# Patient Record
Sex: MALE | Race: WHITE | NOT HISPANIC OR LATINO | URBAN - METROPOLITAN AREA
[De-identification: names, ages, dates, MRNs, and addresses within clinical notes are randomized per-mention and may not be internally consistent; named-entity substitution may affect disease eponyms.]

---

## 2018-01-29 ENCOUNTER — INPATIENT (INPATIENT)
Facility: HOSPITAL | Age: 83
LOS: 10 days | Discharge: NURSING, REHAB OR LONG-TERM CARE | End: 2018-02-09
Attending: FAMILY MEDICINE | Admitting: FAMILY MEDICINE
Payer: MEDICARE

## 2018-01-29 VITALS
OXYGEN SATURATION: 84 % | RESPIRATION RATE: 16 BRPM | HEART RATE: 88 BPM | SYSTOLIC BLOOD PRESSURE: 108 MMHG | WEIGHT: 169.98 LBS | DIASTOLIC BLOOD PRESSURE: 54 MMHG | TEMPERATURE: 99 F | HEIGHT: 68 IN

## 2018-01-29 LAB
ADD ON TEST-SPECIMEN IN LAB: SIGNIFICANT CHANGE UP
ALBUMIN SERPL ELPH-MCNC: 3.1 G/DL — LOW (ref 3.3–5)
ALP SERPL-CCNC: 98 U/L — SIGNIFICANT CHANGE UP (ref 40–120)
ALT FLD-CCNC: 37 U/L — SIGNIFICANT CHANGE UP (ref 12–78)
ANION GAP SERPL CALC-SCNC: 7 MMOL/L — SIGNIFICANT CHANGE UP (ref 5–17)
ANION GAP SERPL CALC-SCNC: 8 MMOL/L — SIGNIFICANT CHANGE UP (ref 5–17)
ANISOCYTOSIS BLD QL: SLIGHT — SIGNIFICANT CHANGE UP
APTT BLD: 27.9 SEC — SIGNIFICANT CHANGE UP (ref 27.5–37.4)
AST SERPL-CCNC: 55 U/L — HIGH (ref 15–37)
BASOPHILS # BLD AUTO: 0 K/UL — SIGNIFICANT CHANGE UP (ref 0–0.2)
BILIRUB SERPL-MCNC: 0.6 MG/DL — SIGNIFICANT CHANGE UP (ref 0.2–1.2)
BUN SERPL-MCNC: 51 MG/DL — HIGH (ref 7–23)
BUN SERPL-MCNC: 53 MG/DL — HIGH (ref 7–23)
CALCIUM SERPL-MCNC: 8.9 MG/DL — SIGNIFICANT CHANGE UP (ref 8.5–10.1)
CALCIUM SERPL-MCNC: 9 MG/DL — SIGNIFICANT CHANGE UP (ref 8.5–10.1)
CHLORIDE SERPL-SCNC: 103 MMOL/L — SIGNIFICANT CHANGE UP (ref 96–108)
CHLORIDE SERPL-SCNC: 105 MMOL/L — SIGNIFICANT CHANGE UP (ref 96–108)
CO2 SERPL-SCNC: 27 MMOL/L — SIGNIFICANT CHANGE UP (ref 22–31)
CO2 SERPL-SCNC: 27 MMOL/L — SIGNIFICANT CHANGE UP (ref 22–31)
CREAT SERPL-MCNC: 3.59 MG/DL — HIGH (ref 0.5–1.3)
CREAT SERPL-MCNC: 4.05 MG/DL — HIGH (ref 0.5–1.3)
EOSINOPHIL # BLD AUTO: 0 K/UL — SIGNIFICANT CHANGE UP (ref 0–0.5)
ERYTHROCYTE [SEDIMENTATION RATE] IN BLOOD: 40 MM/HR — HIGH (ref 0–20)
GLUCOSE SERPL-MCNC: 106 MG/DL — HIGH (ref 70–99)
GLUCOSE SERPL-MCNC: 107 MG/DL — HIGH (ref 70–99)
HCT VFR BLD CALC: 31.9 % — LOW (ref 39–50)
HGB BLD-MCNC: 10.4 G/DL — LOW (ref 13–17)
INR BLD: 1.19 RATIO — HIGH (ref 0.88–1.16)
LACTATE SERPL-SCNC: 1.1 MMOL/L — SIGNIFICANT CHANGE UP (ref 0.7–2)
LYMPHOCYTES # BLD AUTO: 1.1 K/UL — SIGNIFICANT CHANGE UP (ref 1–3.3)
LYMPHOCYTES # BLD AUTO: 19 % — SIGNIFICANT CHANGE UP (ref 13–44)
MANUAL SMEAR VERIFICATION: SIGNIFICANT CHANGE UP
MCHC RBC-ENTMCNC: 29.7 PG — SIGNIFICANT CHANGE UP (ref 27–34)
MCHC RBC-ENTMCNC: 32.6 GM/DL — SIGNIFICANT CHANGE UP (ref 32–36)
MCV RBC AUTO: 91.1 FL — SIGNIFICANT CHANGE UP (ref 80–100)
MONOCYTES # BLD AUTO: 0.7 K/UL — SIGNIFICANT CHANGE UP (ref 0–0.9)
MONOCYTES NFR BLD AUTO: 13 % — SIGNIFICANT CHANGE UP (ref 2–14)
NEUTROPHILS # BLD AUTO: 3.8 K/UL — SIGNIFICANT CHANGE UP (ref 1.8–7.4)
NEUTROPHILS NFR BLD AUTO: 62 % — SIGNIFICANT CHANGE UP (ref 43–77)
NEUTS BAND # BLD: 5 % — SIGNIFICANT CHANGE UP (ref 0–8)
NT-PROBNP SERPL-SCNC: 1903 PG/ML — HIGH (ref 0–450)
PLAT MORPH BLD: NORMAL — SIGNIFICANT CHANGE UP
PLATELET # BLD AUTO: 75 K/UL — LOW (ref 150–400)
PLATELET COUNT - ESTIMATE: (no result)
POIKILOCYTOSIS BLD QL AUTO: SLIGHT — SIGNIFICANT CHANGE UP
POTASSIUM SERPL-MCNC: 4.3 MMOL/L — SIGNIFICANT CHANGE UP (ref 3.5–5.3)
POTASSIUM SERPL-MCNC: 4.4 MMOL/L — SIGNIFICANT CHANGE UP (ref 3.5–5.3)
POTASSIUM SERPL-SCNC: 4.3 MMOL/L — SIGNIFICANT CHANGE UP (ref 3.5–5.3)
POTASSIUM SERPL-SCNC: 4.4 MMOL/L — SIGNIFICANT CHANGE UP (ref 3.5–5.3)
PROT SERPL-MCNC: 7 GM/DL — SIGNIFICANT CHANGE UP (ref 6–8.3)
PROTHROM AB SERPL-ACNC: 12.9 SEC — HIGH (ref 9.8–12.7)
RBC # BLD: 3.5 M/UL — LOW (ref 4.2–5.8)
RBC # FLD: 12.9 % — SIGNIFICANT CHANGE UP (ref 10.3–14.5)
RBC BLD AUTO: (no result)
SODIUM SERPL-SCNC: 137 MMOL/L — SIGNIFICANT CHANGE UP (ref 135–145)
SODIUM SERPL-SCNC: 140 MMOL/L — SIGNIFICANT CHANGE UP (ref 135–145)
TROPONIN I SERPL-MCNC: 0.03 NG/ML — SIGNIFICANT CHANGE UP (ref 0.01–0.04)
TROPONIN I SERPL-MCNC: 0.03 NG/ML — SIGNIFICANT CHANGE UP (ref 0.01–0.04)
VARIANT LYMPHS # BLD: 1 % — SIGNIFICANT CHANGE UP (ref 0–6)
WBC # BLD: 5.6 K/UL — SIGNIFICANT CHANGE UP (ref 3.8–10.5)
WBC # FLD AUTO: 5.6 K/UL — SIGNIFICANT CHANGE UP (ref 3.8–10.5)

## 2018-01-29 PROCEDURE — 93010 ELECTROCARDIOGRAM REPORT: CPT

## 2018-01-29 PROCEDURE — 99285 EMERGENCY DEPT VISIT HI MDM: CPT

## 2018-01-29 PROCEDURE — 71250 CT THORAX DX C-: CPT | Mod: 26

## 2018-01-29 PROCEDURE — 71045 X-RAY EXAM CHEST 1 VIEW: CPT | Mod: 26

## 2018-01-29 PROCEDURE — 74176 CT ABD & PELVIS W/O CONTRAST: CPT | Mod: 26

## 2018-01-29 PROCEDURE — 76700 US EXAM ABDOM COMPLETE: CPT | Mod: 26

## 2018-01-29 PROCEDURE — 93970 EXTREMITY STUDY: CPT | Mod: 26

## 2018-01-29 RX ORDER — IPRATROPIUM/ALBUTEROL SULFATE 18-103MCG
3 AEROSOL WITH ADAPTER (GRAM) INHALATION EVERY 4 HOURS
Qty: 0 | Refills: 0 | Status: DISCONTINUED | OUTPATIENT
Start: 2018-01-29 | End: 2018-02-09

## 2018-01-29 RX ORDER — SODIUM CHLORIDE 9 MG/ML
3 INJECTION INTRAMUSCULAR; INTRAVENOUS; SUBCUTANEOUS ONCE
Qty: 0 | Refills: 0 | Status: COMPLETED | OUTPATIENT
Start: 2018-01-29 | End: 2018-01-29

## 2018-01-29 RX ORDER — IPRATROPIUM/ALBUTEROL SULFATE 18-103MCG
3 AEROSOL WITH ADAPTER (GRAM) INHALATION ONCE
Qty: 0 | Refills: 0 | Status: COMPLETED | OUTPATIENT
Start: 2018-01-29 | End: 2018-01-29

## 2018-01-29 RX ORDER — HEPARIN SODIUM 5000 [USP'U]/ML
6500 INJECTION INTRAVENOUS; SUBCUTANEOUS ONCE
Qty: 0 | Refills: 0 | Status: DISCONTINUED | OUTPATIENT
Start: 2018-01-29 | End: 2018-01-30

## 2018-01-29 RX ORDER — HEPARIN SODIUM 5000 [USP'U]/ML
INJECTION INTRAVENOUS; SUBCUTANEOUS
Qty: 25000 | Refills: 0 | Status: DISCONTINUED | OUTPATIENT
Start: 2018-01-29 | End: 2018-01-30

## 2018-01-29 RX ORDER — HEPARIN SODIUM 5000 [USP'U]/ML
3000 INJECTION INTRAVENOUS; SUBCUTANEOUS EVERY 6 HOURS
Qty: 0 | Refills: 0 | Status: DISCONTINUED | OUTPATIENT
Start: 2018-01-29 | End: 2018-01-30

## 2018-01-29 RX ORDER — ACETAMINOPHEN 500 MG
650 TABLET ORAL EVERY 6 HOURS
Qty: 0 | Refills: 0 | Status: DISCONTINUED | OUTPATIENT
Start: 2018-01-29 | End: 2018-02-09

## 2018-01-29 RX ORDER — HEPARIN SODIUM 5000 [USP'U]/ML
6500 INJECTION INTRAVENOUS; SUBCUTANEOUS EVERY 6 HOURS
Qty: 0 | Refills: 0 | Status: DISCONTINUED | OUTPATIENT
Start: 2018-01-29 | End: 2018-01-30

## 2018-01-29 RX ORDER — SODIUM CHLORIDE 9 MG/ML
1000 INJECTION INTRAMUSCULAR; INTRAVENOUS; SUBCUTANEOUS
Qty: 0 | Refills: 0 | Status: DISCONTINUED | OUTPATIENT
Start: 2018-01-29 | End: 2018-02-01

## 2018-01-29 RX ADMIN — Medication 125 MILLIGRAM(S): at 22:46

## 2018-01-29 RX ADMIN — SODIUM CHLORIDE 3 MILLILITER(S): 9 INJECTION INTRAMUSCULAR; INTRAVENOUS; SUBCUTANEOUS at 22:46

## 2018-01-29 RX ADMIN — Medication 3 MILLILITER(S): at 22:46

## 2018-01-29 NOTE — INPATIENT CERTIFICATION FOR MEDICARE PATIENTS - RISKS OF ADVERSE EVENTS
Concern for cardiopulmonary deterioration/Concern for worsening infectious process/Concern for renal deterioration

## 2018-01-29 NOTE — H&P ADULT - ASSESSMENT
Pt is admitted w/    I. Weaknes, presyncope with hypoxia, hypotension DDX PE, sepsis  - blood cx, ua, u cx  - VQ  - Dupplex LE  - start heparin for now  - cont pulse ox    II. ARF  - s/p 1 Liter IVF in the ER  - cont fluids  - Nephrology    III. Thrombocytopenia, anemia  - Hematology    IV. DVT prophylaxis  - hep drip Pt is admitted w/  Pt is an 83 y/o gentleman with a PMHx of HTN, BPH, anxiety and current smoking who was sent to the ER from Dr. Miller's office.  He was having weakness/loss of balance and tremors for 4 days with decreased oral intake.  The office reports pt was suddenly found to be  hypoxic to 67% on RA, with HR 31 and BP 80/60, Temp 99F.   EMS reported  pt had normal vital signs, but then en route pt's blood pressure dropped to 80/40, his O2 sat was fluctuating, and pt was cyanotic with +cough at baseline.    Pt has not been eating much for the last two days, due to poor appetite.  No abd complaints. He reports intermittent burning with urination.  His wife reports he is barely able to walk and has been stumbling.    Pt denies palpitations , chest pain, SOB, fever, chills, n/v, edema or calf pain.  He has a chronic wet cough and runny nose which family and pt reports is unchanged.       Pt received a PNA shot 2 weeks ago.     I. Weaknes, presyncope with hypoxia, hypotension DDX PE, sepsis  - blood cx, ua, u cx  - Dupplex LE neg for DVT  - VQ in AM  - start heparin for now  - cont pulse ox  - hold metoprolol  - repeat EKG and troponin, cardiology Dr. Seymour, requested by family    II. ARF (tremulousness likely due to Uremia)  DDX Urinary obstruction, BPH, Multiple Myeloma  - s/p 1 Liter IVF in the ER  - cont fluids  - hold losartan  - US and CT bladder  - SPEP and UPEP, Bence Salgado proteins  [place] [facility] [place]    III. COPD exac, chronic cough  - Solumedrol 125mg given in the ER, will cont 40mg BID  - cont nebs  - smoking cessation    IV. Thrombocytopenia, anemia, elevated ESR  - repeat labs    V. DVT prophylaxis  - on hep drip Pt is admitted w/  Pt is an 81 y/o gentleman with a PMHx of HTN, BPH, anxiety and current smoking who was sent to the ER from Dr. Miller's office.  He was having weakness/loss of balance and tremors for 4 days with decreased oral intake.  The office reports pt was suddenly found to be  hypoxic to 67% on RA, with HR 31 and BP 80/60, Temp 99F.   EMS reported  pt had normal vital signs, but then en route pt's blood pressure dropped to 80/40, his O2 sat was fluctuating, and pt was cyanotic with +cough at baseline.    Pt has not been eating much for the last two days, due to poor appetite.  No abd complaints. He reports intermittent burning with urination.  His wife reports he is barely able to walk and has been stumbling.    Pt denies palpitations , chest pain, SOB, fever, chills, n/v, edema or calf pain.  He has a chronic wet cough and runny nose which family and pt reports is unchanged.       Pt received a PNA shot 2 weeks ago.     I. Weaknes, presyncope with hypoxia, hypotension DDX PE, sepsis  - blood cx, ua, u cx  - Dupplex LE neg for DVT  - VQ in AM  - start heparin for now  - cont pulse ox  - hold metoprolol and flomax for now  - repeat EKG and troponin, cardiology Dr. Seymour, requested by family    II. ARF (tremulousness likely due to Uremia)  DDX Urinary obstruction, BPH, Multiple Myeloma  - s/p 1 Liter IVF in the ER  - cont fluids  - hold losartan  - US and CT bladder  - SPEP and UPEP, Bence Salgado proteins  - Nephrology Dr. Perez from [facility]    III. COPD exac, chronic cough  - Solumedrol 125mg given in the ER, will cont 40mg BID  - cont nebs  - smoking cessation    IV. Thrombocytopenia, anemia, elevated ESR  - repeat labs    V. DVT prophylaxis  - on hep drip

## 2018-01-29 NOTE — H&P ADULT - NSHPSOCIALHISTORY_GEN_ALL_CORE
Pt is retired from working with [employer].  He lives with his wife. Currently smokes 5 - 7 cig/day.  60 pack year smoking. Pt is retired from working with [employer].  He lives with his wife. Currently smokes 5 - 7 cig/day.  60 pack year smoking.  Pt and family report 1 glass of wine/week.  No drug use.

## 2018-01-29 NOTE — ED PROVIDER NOTE - OBJECTIVE STATEMENT
81 y/o male with a PMHx of HTN on meds, anxiety on Neurica presents to the ED BIBA regarding hypoxia at PMD's office today. Pt went to see PMD, Dr. Miller, due to recent loss of balance and tremors. At the office pt was hypoxic without any complaints. Per EMS, upon EMS arrival, pt had normal vital signs, but then in route pt's blood pressure dropped to 80/40, his O2 stat was fluctuating, and pt was cyanotic. +cough at baseline. No CP, SOB, fever, dysuria. Smoker (4-5 cigarettes per day). Pt received a PNA shot 2 weeks ago. No recent medication changes.

## 2018-01-29 NOTE — ED PROVIDER NOTE - NS_ ATTENDINGSCRIBEDETAILS _ED_A_ED_FT
I, Jerry Kendall MD,  performed the initial face to face bedside interview with this patient regarding history of present illness, review of symptoms and relevant past medical, social and family history.  I completed an independent physical examination.    The history, relevant review of systems, past medical and surgical history, medical decision making, and physical examination was documented by the scribe in my presence and I attest to the accuracy of the documentation.

## 2018-01-29 NOTE — H&P ADULT - NSHPPHYSICALEXAM_GEN_ALL_CORE
ICU Vital Signs Last 24 Hrs    T(F): 98.8 (29 Jan 2018 16:56), Max: 98.8 (29 Jan 2018 16:56)    HR: 55 (29 Jan 2018 17:24) (55 - 88)    BP: 110/47 (29 Jan 2018 17:24) (108/54 - 110/47)      RR: 16    SpO2: 98%

## 2018-01-29 NOTE — ED ADULT NURSE NOTE - OBJECTIVE STATEMENT
sent by marck Razo from dr jane's office for hypotension and 4 days of tremors and imbalance, as well as anorexia.  denies N/V/D.  Magda has chronic cough.  patient has been smoking for 60+ years up to 2 ppd, stopped drinking etoh 5 yrs ago previous heavy drinking

## 2018-01-29 NOTE — ED ADULT NURSE REASSESSMENT NOTE - NS ED NURSE REASSESS COMMENT FT1
Heparin drip ordered since 1900 hrs. MD Pérez made aware of needing actual weight. Actual weight obtained and placed in chart. MD pérez made aware. Awaiting for new orders to be placed since then. Will follow up.

## 2018-01-29 NOTE — H&P ADULT - HISTORY OF PRESENT ILLNESS
83 y/o male with a PMHx of HTN on meds, anxiety on Neurica presents to the ED BIBA regarding hypoxia at PMD's office today. Pt went to see PMD, Dr. Miller, due to recent loss of balance and tremors. At the office pt was hypoxic without any complaints. Per EMS, upon EMS arrival, pt had normal vital signs, but then in route pt's blood pressure dropped to 80/40, his O2 stat was fluctuating, and pt was cyanotic. +cough at baseline. No CP, SOB, fever, dysuria. Smoker (4-5 cigarettes per day). Pt received a PNA shot 2 weeks ago. No recent medication changes. Pt is an 81 y/o gentleman with a PMHx of HTN, BPH, anxiety and current smoking who was sent to the ER from Dr. Miller's office.  He was having weakness/loss of balance and tremors for 4 days with decreased oral intake.   Pt was suddenly found to be  hypoxia to 67% on RA, with HR 31 and BP 80/60, Temp 99F  at the office.   EMS reported  pt had normal vital signs, but then in route pt's blood pressure dropped to 80/40, his O2 stat was fluctuating, and pt was cyanotic with +cough at baseline.    Pt  No CP, SOB, fever, dysuria. Smoker (4-5 cigarettes per day). Pt received a PNA shot 2 weeks ago. No recent medication changes. Pt is an 83 y/o gentleman with a PMHx of HTN, BPH, anxiety and current smoking who was sent to the ER from Dr. Miller's office.  He was having weakness/loss of balance and tremors for 4 days with decreased oral intake.  The office reports pt was suddenly found to be  hypoxic to 67% on RA, with HR 31 and BP 80/60, Temp 99F.   EMS reported  pt had normal vital signs, but then en route pt's blood pressure dropped to 80/40, his O2 sat was fluctuating, and pt was cyanotic with +cough at baseline.    Pt has not been eating much for the last two days, due to poor appetite.  No abd complaints. He reports intermittent burning with urination.  His wife reports he is barely able to walk and has been stumbling.    Pt denies palpitations , chest pain, SOB, fever, chills, n/v, edema or calf pain.  He has a chronic wet cough and runny nose which family and pt reports is unchanged.       Pt received a PNA shot 2 weeks ago.  He does not like to see the doctor.     PMed/Surg Hx   HTN,    BPH,   Tobacco Dependance 60 pack years  Bronchitis/Likely COPD  anxiety   Neuropathy  Skin cancer lesions removed from face and back    Fam Hx:  Non-contrib to current adm

## 2018-01-29 NOTE — H&P ADULT - RS GEN PE MLT RESP DETAILS PC
no intercostal retractions/L>R wheezes/diminished breath sounds, R/wheezes/rhonchi/diminished breath sounds, L

## 2018-01-30 LAB
ANION GAP SERPL CALC-SCNC: 8 MMOL/L — SIGNIFICANT CHANGE UP (ref 5–17)
APPEARANCE UR: CLEAR — SIGNIFICANT CHANGE UP
APTT BLD: > 200 SEC (ref 27.5–37.4)
BACTERIA # UR AUTO: (no result)
BASOPHILS # BLD AUTO: 0 K/UL — SIGNIFICANT CHANGE UP (ref 0–0.2)
BASOPHILS NFR BLD AUTO: 0.2 % — SIGNIFICANT CHANGE UP (ref 0–2)
BILIRUB UR-MCNC: NEGATIVE — SIGNIFICANT CHANGE UP
BUN SERPL-MCNC: 55 MG/DL — HIGH (ref 7–23)
CALCIUM SERPL-MCNC: 8.6 MG/DL — SIGNIFICANT CHANGE UP (ref 8.5–10.1)
CHLORIDE SERPL-SCNC: 103 MMOL/L — SIGNIFICANT CHANGE UP (ref 96–108)
CK SERPL-CCNC: 2084 U/L — HIGH (ref 26–308)
CO2 SERPL-SCNC: 27 MMOL/L — SIGNIFICANT CHANGE UP (ref 22–31)
COLOR SPEC: YELLOW — SIGNIFICANT CHANGE UP
CREAT SERPL-MCNC: 3.61 MG/DL — HIGH (ref 0.5–1.3)
CRP SERPL-MCNC: 3.1 MG/DL — HIGH (ref 0–0.4)
DIFF PNL FLD: (no result)
EOSINOPHIL # BLD AUTO: 0 K/UL — SIGNIFICANT CHANGE UP (ref 0–0.5)
EOSINOPHIL NFR BLD AUTO: 0 % — SIGNIFICANT CHANGE UP (ref 0–6)
EPI CELLS # UR: SIGNIFICANT CHANGE UP
FLUAV H1 2009 PAND RNA SPEC QL NAA+PROBE: DETECTED
GLUCOSE SERPL-MCNC: 243 MG/DL — HIGH (ref 70–99)
GLUCOSE UR QL: NEGATIVE MG/DL — SIGNIFICANT CHANGE UP
HCT VFR BLD CALC: 31 % — LOW (ref 39–50)
HCT VFR BLD CALC: 33 % — LOW (ref 39–50)
HGB BLD-MCNC: 10.2 G/DL — LOW (ref 13–17)
HGB BLD-MCNC: 10.8 G/DL — LOW (ref 13–17)
KETONES UR-MCNC: NEGATIVE — SIGNIFICANT CHANGE UP
LEUKOCYTE ESTERASE UR-ACNC: NEGATIVE — SIGNIFICANT CHANGE UP
LYMPHOCYTES # BLD AUTO: 0.4 K/UL — LOW (ref 1–3.3)
LYMPHOCYTES # BLD AUTO: 7.7 % — LOW (ref 13–44)
MCHC RBC-ENTMCNC: 29.8 PG — SIGNIFICANT CHANGE UP (ref 27–34)
MCHC RBC-ENTMCNC: 29.9 PG — SIGNIFICANT CHANGE UP (ref 27–34)
MCHC RBC-ENTMCNC: 32.6 GM/DL — SIGNIFICANT CHANGE UP (ref 32–36)
MCHC RBC-ENTMCNC: 32.8 GM/DL — SIGNIFICANT CHANGE UP (ref 32–36)
MCV RBC AUTO: 91.2 FL — SIGNIFICANT CHANGE UP (ref 80–100)
MCV RBC AUTO: 91.3 FL — SIGNIFICANT CHANGE UP (ref 80–100)
MONOCYTES # BLD AUTO: 0.2 K/UL — SIGNIFICANT CHANGE UP (ref 0–0.9)
MONOCYTES NFR BLD AUTO: 3.6 % — SIGNIFICANT CHANGE UP (ref 2–14)
NEUTROPHILS # BLD AUTO: 4.7 K/UL — SIGNIFICANT CHANGE UP (ref 1.8–7.4)
NEUTROPHILS NFR BLD AUTO: 88.5 % — HIGH (ref 43–77)
NITRITE UR-MCNC: NEGATIVE — SIGNIFICANT CHANGE UP
PH UR: 5 — SIGNIFICANT CHANGE UP (ref 5–8)
PLATELET # BLD AUTO: 70 K/UL — LOW (ref 150–400)
PLATELET # BLD AUTO: 77 K/UL — LOW (ref 150–400)
POTASSIUM SERPL-MCNC: 4.2 MMOL/L — SIGNIFICANT CHANGE UP (ref 3.5–5.3)
POTASSIUM SERPL-SCNC: 4.2 MMOL/L — SIGNIFICANT CHANGE UP (ref 3.5–5.3)
PROT ?TM UR-MCNC: 41 MG/DL — HIGH (ref 0–12)
PROT SERPL-MCNC: 6.5 G/DL — SIGNIFICANT CHANGE UP (ref 6–8.3)
PROT SERPL-MCNC: 6.5 G/DL — SIGNIFICANT CHANGE UP (ref 6–8.3)
PROT UR-MCNC: 30 MG/DL
RAPID RVP RESULT: DETECTED
RBC # BLD: 3.39 M/UL — LOW (ref 4.2–5.8)
RBC # BLD: 3.62 M/UL — LOW (ref 4.2–5.8)
RBC # FLD: 12.6 % — SIGNIFICANT CHANGE UP (ref 10.3–14.5)
RBC # FLD: 12.8 % — SIGNIFICANT CHANGE UP (ref 10.3–14.5)
RBC CASTS # UR COMP ASSIST: (no result) /HPF (ref 0–4)
SODIUM SERPL-SCNC: 138 MMOL/L — SIGNIFICANT CHANGE UP (ref 135–145)
SP GR SPEC: 1.02 — SIGNIFICANT CHANGE UP (ref 1.01–1.02)
TROPONIN I SERPL-MCNC: 0.02 NG/ML — SIGNIFICANT CHANGE UP (ref 0.01–0.04)
TROPONIN I SERPL-MCNC: 0.02 NG/ML — SIGNIFICANT CHANGE UP (ref 0.01–0.04)
TROPONIN I SERPL-MCNC: 0.05 NG/ML — HIGH (ref 0.01–0.04)
TSH SERPL-MCNC: 0.36 UU/ML — SIGNIFICANT CHANGE UP (ref 0.36–3.74)
UROBILINOGEN FLD QL: NEGATIVE MG/DL — SIGNIFICANT CHANGE UP
WBC # BLD: 5.3 K/UL — SIGNIFICANT CHANGE UP (ref 3.8–10.5)
WBC # BLD: 5.6 K/UL — SIGNIFICANT CHANGE UP (ref 3.8–10.5)
WBC # FLD AUTO: 5.3 K/UL — SIGNIFICANT CHANGE UP (ref 3.8–10.5)
WBC # FLD AUTO: 5.6 K/UL — SIGNIFICANT CHANGE UP (ref 3.8–10.5)
WBC UR QL: SIGNIFICANT CHANGE UP

## 2018-01-30 PROCEDURE — 93010 ELECTROCARDIOGRAM REPORT: CPT

## 2018-01-30 RX ORDER — HEPARIN SODIUM 5000 [USP'U]/ML
7000 INJECTION INTRAVENOUS; SUBCUTANEOUS EVERY 6 HOURS
Qty: 0 | Refills: 0 | Status: DISCONTINUED | OUTPATIENT
Start: 2018-01-30 | End: 2018-01-30

## 2018-01-30 RX ORDER — HEPARIN SODIUM 5000 [USP'U]/ML
INJECTION INTRAVENOUS; SUBCUTANEOUS
Qty: 25000 | Refills: 0 | Status: DISCONTINUED | OUTPATIENT
Start: 2018-01-30 | End: 2018-01-30

## 2018-01-30 RX ORDER — HEPARIN SODIUM 5000 [USP'U]/ML
7000 INJECTION INTRAVENOUS; SUBCUTANEOUS ONCE
Qty: 0 | Refills: 0 | Status: COMPLETED | OUTPATIENT
Start: 2018-01-30 | End: 2018-01-30

## 2018-01-30 RX ORDER — HEPARIN SODIUM 5000 [USP'U]/ML
3500 INJECTION INTRAVENOUS; SUBCUTANEOUS EVERY 6 HOURS
Qty: 0 | Refills: 0 | Status: DISCONTINUED | OUTPATIENT
Start: 2018-01-30 | End: 2018-01-30

## 2018-01-30 RX ORDER — ATORVASTATIN CALCIUM 80 MG/1
40 TABLET, FILM COATED ORAL AT BEDTIME
Qty: 0 | Refills: 0 | Status: DISCONTINUED | OUTPATIENT
Start: 2018-01-30 | End: 2018-02-09

## 2018-01-30 RX ORDER — ASPIRIN/CALCIUM CARB/MAGNESIUM 324 MG
81 TABLET ORAL DAILY
Qty: 0 | Refills: 0 | Status: DISCONTINUED | OUTPATIENT
Start: 2018-01-30 | End: 2018-02-09

## 2018-01-30 RX ADMIN — SODIUM CHLORIDE 100 MILLILITER(S): 9 INJECTION INTRAMUSCULAR; INTRAVENOUS; SUBCUTANEOUS at 05:03

## 2018-01-30 RX ADMIN — HEPARIN SODIUM 7000 UNIT(S): 5000 INJECTION INTRAVENOUS; SUBCUTANEOUS at 02:12

## 2018-01-30 RX ADMIN — Medication 3 MILLILITER(S): at 04:00

## 2018-01-30 RX ADMIN — Medication 3 MILLILITER(S): at 09:08

## 2018-01-30 RX ADMIN — HEPARIN SODIUM 0 UNIT(S)/HR: 5000 INJECTION INTRAVENOUS; SUBCUTANEOUS at 10:13

## 2018-01-30 RX ADMIN — Medication 3 MILLILITER(S): at 18:38

## 2018-01-30 RX ADMIN — Medication 3 MILLILITER(S): at 01:28

## 2018-01-30 RX ADMIN — Medication 40 MILLIGRAM(S): at 18:13

## 2018-01-30 RX ADMIN — Medication 3 MILLILITER(S): at 21:33

## 2018-01-30 RX ADMIN — Medication 30 MILLIGRAM(S): at 18:13

## 2018-01-30 RX ADMIN — HEPARIN SODIUM 1300 UNIT(S)/HR: 5000 INJECTION INTRAVENOUS; SUBCUTANEOUS at 11:20

## 2018-01-30 RX ADMIN — Medication 40 MILLIGRAM(S): at 04:37

## 2018-01-30 RX ADMIN — Medication 81 MILLIGRAM(S): at 18:13

## 2018-01-30 RX ADMIN — HEPARIN SODIUM 1600 UNIT(S)/HR: 5000 INJECTION INTRAVENOUS; SUBCUTANEOUS at 02:13

## 2018-01-30 NOTE — ED ADULT NURSE REASSESSMENT NOTE - NS ED NURSE REASSESS COMMENT FT1
Additional 20G pvl placed on right lower arm for fluid infusion. Maintenance fluid initiated. All needs are met and safety maintained. Will continue to monitor.

## 2018-01-30 NOTE — ED ADULT NURSE REASSESSMENT NOTE - NS ED NURSE REASSESS COMMENT FT1
Bedside ultrasound done as per Dr. Pérez request. 485 ml of urine noted. Pt straight cath and tolerated well. 500 ml of clear dark yellow urine noted. urine sent to Lab. In addition, pt sat maintaining 88~89 with 4L NC. Due to history of heaving smoking for long period, Dr pérez okay with it. Will continue to monitor.

## 2018-01-30 NOTE — PROGRESS NOTE ADULT - SUBJECTIVE AND OBJECTIVE BOX
Chief Complaint/Reason for Admission: weakness, low blood pressure and hypoxia	    History of Present Illness: 	  Pt is an 83 y/o gentleman with a PMHx of HTN, BPH, anxiety and current smoking who was sent to the ER from Dr. Miller's office.  He was having weakness/loss of balance and tremors for 4 days with decreased oral intake.  The office reports pt was suddenly found to be  hypoxic to 67% on RA, with HR 31 and BP 80/60, Temp 99F.   EMS reported  pt had normal vital signs, but then en route pt's blood pressure dropped to 80/40, his O2 sat was fluctuating, and pt was cyanotic with +cough at baseline.    Pt has not been eating much for the last two days, due to poor appetite.  No abd complaints. He reports intermittent burning with urination.  His wife reports he is barely able to walk and has been stumbling.    Pt denies palpitations , chest pain, SOB, fever, chills, n/v, edema or calf pain.  He has a chronic wet cough and runny nose which family and pt reports is unchanged.       Pt received a PNA shot 2 weeks ago.  He does not like to see the doctor.    REVIEW OF SYSTEMS:  General: NAD, hemodynamically stable, (-)  fever, (-) chills, (-) weakness  HEENT:  Eyes:  No visual loss, blurred vision, double vision or yellow sclerae. Ears, Nose, Throat:  No hearing loss, sneezing, congestion, runny nose or sore throat.  SKIN:  No rash or itching.  CARDIOVASCULAR:  No chest pain, chest pressure or chest discomfort. No palpitations or edema.  RESPIRATORY:  No shortness of breath, cough or sputum.  GASTROINTESTINAL:  No anorexia, nausea, vomiting or diarrhea. No abdominal pain or blood.  NEUROLOGICAL:  No headache, dizziness, syncope, paralysis, ataxia, numbness or tingling in the extremities. No change in bowel or bladder control.  MUSCULOSKELETAL:  No muscle, back pain, joint pain or stiffness.  HEMATOLOGIC:  No anemia, bleeding or bruising.  LYMPHATICS:  No enlarged nodes. No history of splenectomy.  ENDOCRINOLOGIC:  No reports of sweating, cold or heat intolerance. No polyuria or polydipsia.  ALLERGIES:  No history of asthma, hives, eczema or rhinitis.    Physical Exam:   GENERAL APPEARANCE:  NAD, hemodynamically stable  T(C): 36.2 (18 @ 12:20), Max: 37.9 (18 @ 16:59)  HR: 60 (18 @ 12:20) (55 - 88)  BP: 119/63 (18 @ 12:20) (100/51 - 119/63)  RR: 16 (18 @ 12:20) (14 - 17)  SpO2: 100% (18 @ 12:20) (84% - 100%)  Wt(kg): --  HEENT:  Head is normocephalic    Skin:  Warm and dry without any rash   NECK:  Supple without lymphadenopathy.   HEART:  Regular rate and rhythm. normal S1 and S2, No M/R/G  LUNGS:  Good ins/exp effort, no W/R/R/C  ABDOMEN:  Soft, nontender, nondistended with good bowel sounds heard  EXTREMITIES:  Without cyanosis, clubbing or edema.   NEUROLOGICAL:  Gross nonfocal     Labs:   CBC Full  -  ( 2018 07:55 )  WBC Count : 5.6 K/uL  Hemoglobin : 10.8 g/dL  Hematocrit : 33.0 %  Platelet Count - Automated : 70 K/uL    PT/INR - ( 2018 17:22 )   PT: 12.9 sec;   INR: 1.19 ratio       PTT - ( 2018 07:55 )  PTT:> 200 sec  Urinalysis Basic - ( 2018 01:07 )    Color: Yellow / Appearance: Clear / S.020 / pH: x  Gluc: x / Ketone: Negative  / Bili: Negative / Urobili: Negative mg/dL   Blood: x / Protein: 30 mg/dL / Nitrite: Negative   Leuk Esterase: Negative / RBC: 25-50 /HPF / WBC 3-5   Sq Epi: x / Non Sq Epi: Few / Bacteria: Few    138  |  103  |  55<H>  ----------------------------<  243<H>  4.2   |  27  |  3.61<H>    Ca    8.6      2018 04:52    TPro  7.0  /  Alb  3.1<L>  /  TBili  0.6  /  DBili  x   /  AST  55<H>  /  ALT  37  /  AlkPhos  98              # Sepsis secondary to flue  - blood cx, ua, u cx  - Dupplex LE neg for DVT  - sstop heparin drip /  V/Q was not done since medical team believes that patient's hypoxia could be very much be secondary to viral illness  - hold metoprolol and flomax for now  - care discussed with Dr. Santacruz, concern was raised in regards of the thrombocytopenia and patient being on heparin drip. If persistant hypoxia will do VQ scan.     # ARF (tremulousness likely due to Uremia) / r/o MM / obstruction  - s/p 1 Liter IVF in the ER  - cont fluids  - hold losartan  - US and CT bladder  - SPEP and UPEP, Bence Salgado proteins    # acute on chronic hypoxic respiratory failure secondary to acute COPD excercabation  - Solumedrol 125mg given in the ER, will cont 40mg BID  - cont nebs  - smoking cessation    # Thrombocytopenia, anemia, elevated ESR  - repeat labs    # DVT prophylaxis  - venodynes Chief Complaint/Reason for Admission: weakness, low blood pressure and hypoxia	    History of Present Illness: 	  Pt is an 83 y/o gentleman with a PMHx of HTN, BPH, anxiety and current smoking who was sent to the ER from Dr. Miller's office.  He was having weakness/loss of balance and tremors for 4 days with decreased oral intake.  The office reports pt was suddenly found to be  hypoxic to 67% on RA, with HR 31 and BP 80/60, Temp 99F.   EMS reported  pt had normal vital signs, but then en route pt's blood pressure dropped to 80/40, his O2 sat was fluctuating, and pt was cyanotic with +cough at baseline.    Pt has not been eating much for the last two days, due to poor appetite.  No abd complaints. He reports intermittent burning with urination.  His wife reports he is barely able to walk and has been stumbling.    Pt denies palpitations , chest pain, SOB, fever, chills, n/v, edema or calf pain.  He has a chronic wet cough and runny nose which family and pt reports is unchanged.       Pt received a PNA shot 2 weeks ago.  He does not like to see the doctor.    : seen and eval. hemodynamically stable. Denies any HA, CP, SOB. patient appears to be agitated and confused about why he is in the hospital. Labs and vitals discussed with patient and patient's family at the bedside.     REVIEW OF SYSTEMS:  as per HPI    Physical Exam:   GENERAL APPEARANCE:  elderly, deconditioned, frail  T(C): 36.2 (18 @ 12:20), Max: 37.9 (18 @ 16:59)  HR: 60 (18 @ 12:20) (55 - 88)  BP: 119/63 (18 @ 12:20) (100/51 - 119/63)  RR: 16 (18 @ 12:20) (14 - 17)  SpO2: 100% (18 @ 12:20) (84% - 100%)  HEENT:  Head is normocephalic    Skin:  dry  NECK:  no JVD  HEART:  Regular rate and rhythm. normal S1 and S2, No M/R/G  LUNGS:  decreased lung sounds /  ronchi  ABDOMEN:  Soft, nontender, nondistended with good bowel sounds heard  EXTREMITIES:  no swelling  NEUROLOGICAL:  Gross nonfocal     Labs:   CBC Full  -  ( 2018 07:55 )  WBC Count : 5.6 K/uL  Hemoglobin : 10.8 g/dL  Hematocrit : 33.0 %  Platelet Count - Automated : 70 K/uL    PT/INR - ( 2018 17:22 )   PT: 12.9 sec;   INR: 1.19 ratio       PTT - ( 2018 07:55 )  PTT:> 200 sec  Urinalysis Basic - ( 2018 01:07 )    Color: Yellow / Appearance: Clear / S.020 / pH: x  Gluc: x / Ketone: Negative  / Bili: Negative / Urobili: Negative mg/dL   Blood: x / Protein: 30 mg/dL / Nitrite: Negative   Leuk Esterase: Negative / RBC: 25-50 /HPF / WBC 3-5   Sq Epi: x / Non Sq Epi: Few / Bacteria: Few    138  |  103  |  55<H>  ----------------------------<  243<H>  4.2   |  27  |  3.61<H>    Ca    8.6      2018 04:52    TPro  7.0  /  Alb  3.1<L>  /  TBili  0.6  /  DBili  x   /  AST  55<H>  /  ALT  37  /  AlkPhos  98      # Sepsis secondary to vital illness (flue)  - started on tamiflue   - Dupplex LE neg for DVT  - stop heparin drip /  V/Q was not done since medical team believes that patient's hypoxia could be very much be secondary to viral illness  - hold metoprolol and flomax for now  - care discussed with Dr. Santacruz, concern was raised in regards of the thrombocytopenia and patient being on heparin drip. If persistant hypoxia will do VQ scan in the am.     # ARF (tremulousness likely due to Uremia) / r/o MM / obstruction  - s/p 1 Liter IVF in the ER  - cont fluids  - hold losartan  - US and CT bladder  - SPEP and UPEP, Bence Salgado proteins    # acute on chronic hypoxic respiratory failure secondary to acute COPD excercabation  - Solumedrol 125mg given in the ER, will cont 40mg BID  - cont nebs  - smoking cessation    # Thrombocytopenia, anemia, elevated ESR  - will closely trend labs  - as per family patient has never had a low plt count - needs workup    # DVT prophylaxis  - venodynes Chief Complaint/Reason for Admission: weakness, low blood pressure and hypoxia	    History of Present Illness: 	  Pt is an 83 y/o gentleman with a PMHx of HTN, BPH, anxiety and current smoking who was sent to the ER from Dr. Miller's office.  He was having weakness/loss of balance and tremors for 4 days with decreased oral intake.  The office reports pt was suddenly found to be  hypoxic to 67% on RA, with HR 31 and BP 80/60, Temp 99F.   EMS reported  pt had normal vital signs, but then en route pt's blood pressure dropped to 80/40, his O2 sat was fluctuating, and pt was cyanotic with +cough at baseline.    Pt has not been eating much for the last two days, due to poor appetite.  No abd complaints. He reports intermittent burning with urination.  His wife reports he is barely able to walk and has been stumbling.    Pt denies palpitations , chest pain, SOB, fever, chills, n/v, edema or calf pain.  He has a chronic wet cough and runny nose which family and pt reports is unchanged.       Pt received a PNA shot 2 weeks ago.  He does not like to see the doctor.    : seen and eval. hemodynamically stable. Denies any HA, CP, SOB. patient appears to be agitated and confused about why he is in the hospital. Labs and vitals discussed with patient and patient's family at the bedside.     REVIEW OF SYSTEMS:  as per HPI    Physical Exam:   GENERAL APPEARANCE:  elderly, deconditioned, frail  T(C): 36.2 (18 @ 12:20), Max: 37.9 (18 @ 16:59)  HR: 60 (18 @ 12:20) (55 - 88)  BP: 119/63 (18 @ 12:20) (100/51 - 119/63)  RR: 16 (18 @ 12:20) (14 - 17)  SpO2: 100% (18 @ 12:20) (84% - 100%)  HEENT:  Head is normocephalic    Skin:  dry  NECK:  no JVD  HEART:  Regular rate and rhythm. normal S1 and S2, No M/R/G  LUNGS:  decreased lung sounds /  ronchi  ABDOMEN:  Soft, nontender, nondistended with good bowel sounds heard  EXTREMITIES:  no swelling  NEUROLOGICAL:  Gross nonfocal     Labs:   CBC Full  -  ( 2018 07:55 )  WBC Count : 5.6 K/uL  Hemoglobin : 10.8 g/dL  Hematocrit : 33.0 %  Platelet Count - Automated : 70 K/uL    PT/INR - ( 2018 17:22 )   PT: 12.9 sec;   INR: 1.19 ratio       PTT - ( 2018 07:55 )  PTT:> 200 sec  Urinalysis Basic - ( 2018 01:07 )    Color: Yellow / Appearance: Clear / S.020 / pH: x  Gluc: x / Ketone: Negative  / Bili: Negative / Urobili: Negative mg/dL   Blood: x / Protein: 30 mg/dL / Nitrite: Negative   Leuk Esterase: Negative / RBC: 25-50 /HPF / WBC 3-5   Sq Epi: x / Non Sq Epi: Few / Bacteria: Few    138  |  103  |  55<H>  ----------------------------<  243<H>  4.2   |  27  |  3.61<H>    Ca    8.6      2018 04:52    TPro  7.0  /  Alb  3.1<L>  /  TBili  0.6  /  DBili  x   /  AST  55<H>  /  ALT  37  /  AlkPhos  98      # Sepsis secondary to vital illness (flue)  - started on tamiflue 30 mg po qdialy in a setting of renal failure  - Dupplex LE neg for DVT  - stop heparin drip /  V/Q was not done since medical team believes that patient's hypoxia could be very much be secondary to viral illness  - hold metoprolol and flomax for now  - care discussed with Dr. Santacruz, concern was raised in regards of the thrombocytopenia and patient being on heparin drip. If persistant hypoxia will do VQ scan in the am.     # troponemia secondary to demand ischemia secondary to flue  - closely monitor trend  - started on ASA/STATIN    # ARF (tremulousness likely due to Uremia) / r/o MM / obstruction  - s/p 1 Liter IVF in the ER  - cont fluids  - hold losartan  - US and CT bladder  - SPEP and UPEP, Bence Jones proteins    # acute on chronic hypoxic respiratory failure secondary to acute COPD excercabation  - Solumedrol 125mg given in the ER, will cont 40mg BID  - cont nebs  - smoking cessation    # Thrombocytopenia, anemia, elevated ESR  - will closely trend labs  - as per family patient has never had a low plt count - needs workup    # DVT prophylaxis  - venodynes Chief Complaint/Reason for Admission: weakness, low blood pressure and hypoxia	    History of Present Illness: 	  Pt is an 83 y/o gentleman with a PMHx of HTN, BPH, anxiety and current smoking who was sent to the ER from Dr. Miller's office.  He was having weakness/loss of balance and tremors for 4 days with decreased oral intake.  The office reports pt was suddenly found to be  hypoxic to 67% on RA, with HR 31 and BP 80/60, Temp 99F.   EMS reported  pt had normal vital signs, but then en route pt's blood pressure dropped to 80/40, his O2 sat was fluctuating, and pt was cyanotic with +cough at baseline.    Pt has not been eating much for the last two days, due to poor appetite.  No abd complaints. He reports intermittent burning with urination.  His wife reports he is barely able to walk and has been stumbling.    Pt denies palpitations , chest pain, SOB, fever, chills, n/v, edema or calf pain.  He has a chronic wet cough and runny nose which family and pt reports is unchanged.       Pt received a PNA shot 2 weeks ago.  He does not like to see the doctor.    : seen and eval. hemodynamically stable. Denies any HA, CP, SOB. patient appears to be agitated and confused about why he is in the hospital. Labs and vitals discussed with patient and patient's family at the bedside.     REVIEW OF SYSTEMS:  as per HPI    Physical Exam:   GENERAL APPEARANCE:  elderly, deconditioned, frail  T(C): 36.2 (18 @ 12:20), Max: 37.9 (18 @ 16:59)  HR: 60 (18 @ 12:20) (55 - 88)  BP: 119/63 (18 @ 12:20) (100/51 - 119/63)  RR: 16 (18 @ 12:20) (14 - 17)  SpO2: 100% (18 @ 12:20) (84% - 100%)  HEENT:  Head is normocephalic    Skin:  dry  NECK:  no JVD  HEART:  Regular rate and rhythm. normal S1 and S2, No M/R/G  LUNGS:  decreased lung sounds /  ronchi  ABDOMEN:  Soft, nontender, nondistended with good bowel sounds heard  EXTREMITIES:  no swelling  NEUROLOGICAL:  Gross nonfocal     Labs:   CBC Full  -  ( 2018 07:55 )  WBC Count : 5.6 K/uL  Hemoglobin : 10.8 g/dL  Hematocrit : 33.0 %  Platelet Count - Automated : 70 K/uL    PT/INR - ( 2018 17:22 )   PT: 12.9 sec;   INR: 1.19 ratio       PTT - ( 2018 07:55 )  PTT:> 200 sec  Urinalysis Basic - ( 2018 01:07 )    Color: Yellow / Appearance: Clear / S.020 / pH: x  Gluc: x / Ketone: Negative  / Bili: Negative / Urobili: Negative mg/dL   Blood: x / Protein: 30 mg/dL / Nitrite: Negative   Leuk Esterase: Negative / RBC: 25-50 /HPF / WBC 3-5   Sq Epi: x / Non Sq Epi: Few / Bacteria: Few    138  |  103  |  55<H>  ----------------------------<  243<H>  4.2   |  27  |  3.61<H>    Ca    8.6      2018 04:52    TPro  7.0  /  Alb  3.1<L>  /  TBili  0.6  /  DBili  x   /  AST  55<H>  /  ALT  37  /  AlkPhos  98      # Sepsis secondary to viral illness (flue)  - started on tamiflue 30 mg po qdialy in a setting of renal failure  - Dupplex LE neg for DVT  - stop heparin drip /  V/Q was not done since medical team believes that patient's hypoxia could be very much be secondary to viral illness  - hold metoprolol and flomax for now  - care discussed with Dr. Santacruz, concern was raised in regards of the thrombocytopenia and patient being on heparin drip. If persistant hypoxia will do VQ scan in the am.     # troponemia secondary to demand ischemia secondary to flue  - closely monitor trend  - started on ASA/STATIN    # ARF (tremulousness likely due to Uremia) / r/o MM / obstruction  - s/p 1 Liter IVF in the ER  - cont fluids  - hold losartan  - US and CT bladder  - SPEP and UPEP, Bence Jones proteins    # acute on chronic hypoxic respiratory failure secondary to acute COPD excercabation  - Solumedrol 125mg given in the ER, will cont 40mg BID  - cont nebs  - smoking cessation    # Thrombocytopenia, anemia, elevated ESR  - will closely trend labs  - as per family patient has never had a low plt count - needs workup    # DVT prophylaxis  - venodynes Chief Complaint/Reason for Admission: weakness, low blood pressure and hypoxia	    History of Present Illness: 	  Pt is an 81 y/o gentleman with a PMHx of HTN, BPH, anxiety and current smoking who was sent to the ER from Dr. Miller's office.  He was having weakness/loss of balance and tremors for 4 days with decreased oral intake.  The office reports pt was suddenly found to be  hypoxic to 67% on RA, with HR 31 and BP 80/60, Temp 99F.   EMS reported  pt had normal vital signs, but then en route pt's blood pressure dropped to 80/40, his O2 sat was fluctuating, and pt was cyanotic with +cough at baseline.    Pt has not been eating much for the last two days, due to poor appetite.  No abd complaints. He reports intermittent burning with urination.  His wife reports he is barely able to walk and has been stumbling.    Pt denies palpitations , chest pain, SOB, fever, chills, n/v, edema or calf pain.  He has a chronic wet cough and runny nose which family and pt reports is unchanged.       Pt received a PNA shot 2 weeks ago.  He does not like to see the doctor.    : seen and eval. hemodynamically stable. Denies any HA, CP, SOB. patient appears to be agitated and confused about why he is in the hospital. Labs and vitals discussed with patient and patient's family at the bedside.     REVIEW OF SYSTEMS:  as per HPI    Physical Exam:   GENERAL APPEARANCE:  elderly, deconditioned, frail  T(C): 36.2 (18 @ 12:20), Max: 37.9 (18 @ 16:59)  HR: 60 (18 @ 12:20) (55 - 88)  BP: 119/63 (18 @ 12:20) (100/51 - 119/63)  RR: 16 (18 @ 12:20) (14 - 17)  SpO2: 100% (18 @ 12:20) (84% - 100%)  HEENT:  Head is normocephalic    Skin:  dry  NECK:  no JVD  HEART:  Regular rate and rhythm. normal S1 and S2, No M/R/G  LUNGS:  decreased lung sounds /  ronchi  ABDOMEN:  Soft, nontender, nondistended with good bowel sounds heard  EXTREMITIES:  no swelling  NEUROLOGICAL:  Gross nonfocal     Labs:   CBC Full  -  ( 2018 07:55 )  WBC Count : 5.6 K/uL  Hemoglobin : 10.8 g/dL  Hematocrit : 33.0 %  Platelet Count - Automated : 70 K/uL    PT/INR - ( 2018 17:22 )   PT: 12.9 sec;   INR: 1.19 ratio       PTT - ( 2018 07:55 )  PTT:> 200 sec  Urinalysis Basic - ( 2018 01:07 )    Color: Yellow / Appearance: Clear / S.020 / pH: x  Gluc: x / Ketone: Negative  / Bili: Negative / Urobili: Negative mg/dL   Blood: x / Protein: 30 mg/dL / Nitrite: Negative   Leuk Esterase: Negative / RBC: 25-50 /HPF / WBC 3-5   Sq Epi: x / Non Sq Epi: Few / Bacteria: Few    138  |  103  |  55<H>  ----------------------------<  243<H>  4.2   |  27  |  3.61<H>    Ca    8.6      2018 04:52    TPro  7.0  /  Alb  3.1<L>  /  TBili  0.6  /  DBili  x   /  AST  55<H>  /  ALT  37  /  AlkPhos  98      # Sepsis secondary to viral illness (flue)  - started on tamiflue 30 mg po qdialy in a setting of renal failure  - Dupplex LE neg for DVT  - stop heparin drip /  V/Q was not done since medical team believes that patient's hypoxia could be very much be secondary to viral illness  - hold metoprolol and flomax for now  - care discussed with Dr. Santacruz, concern was raised in regards of the thrombocytopenia and patient being on heparin drip. If persistant hypoxia will do VQ scan in the am.     # troponemia secondary to demand ischemia secondary to flue  - closely monitor trend  - started on ASA/STATIN    # ARF (tremulousness likely due to Uremia) / r/o MM / obstruction  - s/p 1 Liter IVF in the ER  - cont fluids  - hold losartan  - US and CT bladder  - SPEP and UPEP, Bence Jones proteins    # acute on chronic hypoxic respiratory failure secondary to acute COPD excercabation  - Solumedrol 125mg given in the ER, will cont 40mg BID  - cont nebs  - smoking cessation    # Thrombocytopenia, anemia, elevated ESR  - differential for thrombocytopenia includes medications (heparin - now off), Sepsis (admission dx), DIC, aplastic anemia, myelodysplastic syndrome, infiltratice processes (myelofibrosis / infections)  - will closely trend labs  - as per family patient has never had a low plt count - needs workup    # DVT prophylaxis  - venodynes

## 2018-01-30 NOTE — ED ADULT NURSE REASSESSMENT NOTE - NS ED NURSE REASSESS COMMENT FT1
Heparin Bolus given and Drip initiated as per order. Droplet precaution maintained for + Influenza AH3. Fluid bolus to be given as per MD. Will continue to monitor.

## 2018-01-30 NOTE — CONSULT NOTE ADULT - SUBJECTIVE AND OBJECTIVE BOX
Patient is a 82y old  Male who presents with a chief complaint of weakness, low blood pressure and hypoxia (2018 19:04)      HPI:  Pt is an 83 y/o gentleman with a PMHx of HTN, BPH, anxiety and current smoking who was sent to the ER from Dr. Miller's office.  He was having weakness/loss of balance and tremors for 4 days with decreased oral intake.  The office reports pt was suddenly found to be  hypoxic to 67% on RA, with HR 31 and BP 80/60, Temp 99F.   EMS reported  pt had normal vital signs, but then en route pt's blood pressure dropped to 80/40, his O2 sat was fluctuating, and pt was cyanotic with +cough at baseline.    Pt has not been eating much for the last two days, due to poor appetite.  No abd complaints. He reports intermittent burning with urination.  His wife reports he is barely able to walk and has been stumbling.    Pt denies palpitations , chest pain, SOB, fever, chills, n/v, edema or calf pain.  He has a chronic wet cough and runny nose which family and pt reports is unchanged.       Pt received a PNA shot 2 weeks ago.  He does not like to see the doctor.     PMed/Surg Hx   HTN,    BPH,   Tobacco Dependance 60 pack years  Bronchitis/Likely COPD  anxiety   Neuropathy  Skin cancer lesions removed from face and back    Fam Hx:  Non-contrib to current adm (2018 19:04)      PAST MEDICAL & SURGICAL HISTORY:  Anxiety  HTN (hypertension)      PREVIOUS CARDIAC WORKUP:      Echo:  Stress Test:  Cardiac Cath:    ALLERGIES:    No Known Allergies       MEDICATIONS  (STANDING):  ALBUTerol/ipratropium for Nebulization 3 milliLiter(s) Nebulizer every 4 hours  heparin  Infusion.  Unit(s)/Hr (16 mL/Hr) IV Continuous <Continuous>  methylPREDNISolone sodium succinate Injectable 40 milliGRAM(s) IV Push two times a day  sodium chloride 0.9%. 1000 milliLiter(s) (100 mL/Hr) IV Continuous <Continuous>    MEDICATIONS  (PRN):  acetaminophen   Tablet. 650 milliGRAM(s) Oral every 6 hours PRN Mild Pain (1 - 3)  heparin  Injectable 7000 Unit(s) IV Push every 6 hours PRN For aPTT less than 40  heparin  Injectable 3500 Unit(s) IV Push every 6 hours PRN For aPTT between 40 - 57      FAMILY HISTORY:        SOCIAL HISTORY:  .scl     ROS:     .ros    Vital Signs Last 24 Hrs  T(C): 36.8 (2018 07:04), Max: 37.9 (2018 16:59)  T(F): 98.2 (2018 07:04), Max: 100.3 (2018 16:59)  HR: 66 (2018 11:30) (55 - 88)  BP: 115/53 (2018 11:30) (100/51 - 115/53)  BP(mean): 68 (2018 05:00) (68 - 71)  RR: 16 (2018 11:30) (14 - 17)  SpO2: 95% (2018 11:30) (84% - 95%)    I&O's Summary      PHYSICAL EXAM:    .phy      TELEMETRY:    ECG:    LABS:                          10.8   5.6   )-----------( 70       ( 2018 07:55 )             33.0         138  |  103  |  55<H>  ----------------------------<  243<H>  4.2   |  27  |  3.61<H>    Ca    8.6      2018 04:52    TPro  7.0  /  Alb  3.1<L>  /  TBili  0.6  /  DBili  x   /  AST  55<H>  /  ALT  37  /  AlkPhos  98   @ 04:52  Trop-I  0.046       @ 21:54  Trop-I  0.032     @ 17:22  Trop-I  0.030    Pro BNP  1903  @ 17:22    PT/INR - ( 2018 17:22 )   PT: 12.9 sec;   INR: 1.19 ratio         PTT - ( 2018 07:55 )  PTT:> 200 sec  Urinalysis Basic - ( 2018 01:07 )    Color: Yellow / Appearance: Clear / S.020 / pH: x  Gluc: x / Ketone: Negative  / Bili: Negative / Urobili: Negative mg/dL   Blood: x / Protein: 30 mg/dL / Nitrite: Negative   Leuk Esterase: Negative / RBC: 25-50 /HPF / WBC 3-5   Sq Epi: x / Non Sq Epi: Few / Bacteria: Few      RADIOLOGY & ADDITIONAL STUDIES:    CT Chest, Abdomen No Cont (18 @ 23:21) > IMPRESSION:    Scattered tree-in-bud opacities along the lungs may represent small  airways disease/infection. No dense consolidation is seen  No evidence of bowel obstruction.  Foci of high density along the urinary bladder posteriorly may represent multiple layering bladder calculi versus wall calcifications. Patient is a 82y old  Male who presents with a chief complaint of weakness, low blood pressure and hypoxia (2018 19:04)      HPI:  Pt is an 83 y/o gentleman with a PMHx of HTN, BPH, anxiety and current smoking who was sent to the ER from Dr. Miller's office.  He was having weakness/loss of balance and tremors for 4 days with decreased oral intake.  The office reports pt was suddenly found to be  hypoxic to 67% on RA, with HR 31 and BP 80/60, Temp 99F.   EMS reported  pt had normal vital signs, but then en route pt's blood pressure dropped to 80/40, his O2 sat was fluctuating, and pt was cyanotic with +cough at baseline.    Pt has not been eating much for the last two days, due to poor appetite.  No abd complaints. He reports intermittent burning with urination.  His wife reports he is barely able to walk and has been stumbling.    Pt denies palpitations , chest pain, SOB, fever, chills, n/v, edema or calf pain.  He has a chronic wet cough and runny nose which family and pt reports is unchanged.       Pt received a PNA shot 2 weeks ago.  He does not like to see the doctor.     PMed/Surg Hx   HTN,    BPH,   Tobacco Dependance 60 pack years  Bronchitis/Likely COPD  anxiety   Neuropathy  Skin cancer lesions removed from face and back    Fam Hx:  Non-contrib to current adm (2018 19:04)      PAST MEDICAL & SURGICAL HISTORY:  Anxiety  HTN (hypertension)      PREVIOUS CARDIAC WORKUP:      Echo:  Stress Test:  Cardiac Cath:    ALLERGIES:    No Known Allergies       MEDICATIONS  (STANDING):  ALBUTerol/ipratropium for Nebulization 3 milliLiter(s) Nebulizer every 4 hours  heparin  Infusion.  Unit(s)/Hr (16 mL/Hr) IV Continuous <Continuous>  methylPREDNISolone sodium succinate Injectable 40 milliGRAM(s) IV Push two times a day  sodium chloride 0.9%. 1000 milliLiter(s) (100 mL/Hr) IV Continuous <Continuous>    MEDICATIONS  (PRN):  acetaminophen   Tablet. 650 milliGRAM(s) Oral every 6 hours PRN Mild Pain (1 - 3)  heparin  Injectable 7000 Unit(s) IV Push every 6 hours PRN For aPTT less than 40  heparin  Injectable 3500 Unit(s) IV Push every 6 hours PRN For aPTT between 40 - 57      FAMILY HISTORY:        SOCIAL HISTORY:  Nonsmoker. No ETOH abuse. No illicit drugs.     ROS:     Detailed ten system ROS was performed and was negative except for history as eluded to above.    + fever  no chills  no nausea  no vomiting  no diarrhea  no constipation  no melena  no hematochezia  no chest pain  no palpitations  + sob at rest  + dyspnea on exertion  + cough  no wheezing  no anorexia  no headache  no dizziness  no syncope  + weakness  no myalgia  no dysuria  no polyuria  no hematuria     Vital Signs Last 24 Hrs  T(C): 36.8 (2018 07:04), Max: 37.9 (2018 16:59)  T(F): 98.2 (2018 07:04), Max: 100.3 (2018 16:59)  HR: 66 (2018 11:30) (55 - 88)  BP: 115/53 (2018 11:30) (100/51 - 115/53)  BP(mean): 68 (2018 05:00) (68 - 71)  RR: 16 (2018 11:30) (14 - 17)  SpO2: 95% (2018 11:30) (84% - 95%)    I&O's Summary      PHYSICAL EXAM:    General:                Comfortable, AAO X 3, in no distress.   HEENT:                  Atraumatic, PERRLA, EOMI, conjunctiva clear.   Neck:                     Supple, no adenopathy, no thyromegaly, no JVD, no bruit.  Back:                     Symmetric, non tender.  Chest:                    scant rhonchi and exp wheezing, B/L symmetric air entry, no tachypnea  Heart:                     S1, S2 normal, no gallop, + murmur.  Abdomen:              Soft, non-tender, bowel sounds active. No palpable masses.  Extremities:           no cyanosis, no edema. Peripheral pulses normal.  Skin:                      Skin color, texture normal. No rashes.  Neurologic:            Grossly nonfocal.       TELEMETRY:  Normal sinus rhythm with no tachy or joseph events     ECG:  NSR, first deg AV block, anterior T wave changes    LABS:                        10.8   5.6   )-----------( 70       ( 2018 07:55 )             33.0     -    138  |  103  |  55<H>  ----------------------------<  243<H>  4.2   |  27  |  3.61<H>    Ca    8.6      2018 04:52    TPro  7.0  /  Alb  3.1<L>  /  TBili  0.6  /  DBili  x   /  AST  55<H>  /  ALT  37  /  AlkPhos  98   @ 04:52  Trop-I  0.046       @ 21:54  Trop-I  0.032     @ 17:22  Trop-I  0.030    Pro BNP  1903  @ 17:22    PT/INR - ( 2018 17:22 )   PT: 12.9 sec;   INR: 1.19 ratio         PTT - ( 2018 07:55 )  PTT:> 200 sec  Urinalysis Basic - ( 2018 01:07 )    Color: Yellow / Appearance: Clear / S.020 / pH: x  Gluc: x / Ketone: Negative  / Bili: Negative / Urobili: Negative mg/dL   Blood: x / Protein: 30 mg/dL / Nitrite: Negative   Leuk Esterase: Negative / RBC: 25-50 /HPF / WBC 3-5   Sq Epi: x / Non Sq Epi: Few / Bacteria: Few      RADIOLOGY & ADDITIONAL STUDIES:    CT Chest, Abdomen No Cont (18 @ 23:21) > IMPRESSION:    Scattered tree-in-bud opacities along the lungs may represent small  airways disease/infection. No dense consolidation is seen  No evidence of bowel obstruction.  Foci of high density along the urinary bladder posteriorly may represent multiple layering bladder calculi versus wall calcifications.

## 2018-01-31 LAB
% ALBUMIN: 52.1 % — SIGNIFICANT CHANGE UP
% ALPHA 1: 5.9 % — SIGNIFICANT CHANGE UP
% ALPHA 2: 12.5 % — SIGNIFICANT CHANGE UP
% BETA: 11.7 % — SIGNIFICANT CHANGE UP
% GAMMA: 17.8 % — SIGNIFICANT CHANGE UP
ALBUMIN SERPL ELPH-MCNC: 3.4 G/DL — LOW (ref 3.6–5.5)
ALBUMIN/GLOB SERPL ELPH: 1.1 RATIO — SIGNIFICANT CHANGE UP
ALPHA1 GLOB SERPL ELPH-MCNC: 0.4 G/DL — SIGNIFICANT CHANGE UP (ref 0.1–0.4)
ALPHA2 GLOB SERPL ELPH-MCNC: 0.8 G/DL — SIGNIFICANT CHANGE UP (ref 0.5–1)
ANION GAP SERPL CALC-SCNC: 11 MMOL/L — SIGNIFICANT CHANGE UP (ref 5–17)
B-GLOBULIN SERPL ELPH-MCNC: 0.8 G/DL — SIGNIFICANT CHANGE UP (ref 0.5–1)
BUN SERPL-MCNC: 60 MG/DL — HIGH (ref 7–23)
CALCIUM SERPL-MCNC: 8.1 MG/DL — LOW (ref 8.5–10.1)
CHLORIDE SERPL-SCNC: 108 MMOL/L — SIGNIFICANT CHANGE UP (ref 96–108)
CHOLEST SERPL-MCNC: 165 MG/DL — SIGNIFICANT CHANGE UP (ref 10–199)
CO2 SERPL-SCNC: 23 MMOL/L — SIGNIFICANT CHANGE UP (ref 22–31)
CREAT ?TM UR-MCNC: 104 MG/DL — SIGNIFICANT CHANGE UP
CREAT SERPL-MCNC: 3.19 MG/DL — HIGH (ref 0.5–1.3)
CULTURE RESULTS: NO GROWTH — SIGNIFICANT CHANGE UP
GAMMA GLOBULIN: 1.2 G/DL — SIGNIFICANT CHANGE UP (ref 0.6–1.6)
GLUCOSE SERPL-MCNC: 142 MG/DL — HIGH (ref 70–99)
HCT VFR BLD CALC: 29.8 % — LOW (ref 39–50)
HDLC SERPL-MCNC: 27 MG/DL — LOW (ref 40–125)
HGB BLD-MCNC: 9.9 G/DL — LOW (ref 13–17)
INTERPRETATION SERPL IFE-IMP: SIGNIFICANT CHANGE UP
LIPID PNL WITH DIRECT LDL SERPL: 109 MG/DL — SIGNIFICANT CHANGE UP
M PROTEIN 24H UR ELPH-MRATE: SIGNIFICANT CHANGE UP
MCHC RBC-ENTMCNC: 30 PG — SIGNIFICANT CHANGE UP (ref 27–34)
MCHC RBC-ENTMCNC: 33.3 GM/DL — SIGNIFICANT CHANGE UP (ref 32–36)
MCV RBC AUTO: 90.3 FL — SIGNIFICANT CHANGE UP (ref 80–100)
PLATELET # BLD AUTO: 73 K/UL — LOW (ref 150–400)
POTASSIUM SERPL-MCNC: 4.3 MMOL/L — SIGNIFICANT CHANGE UP (ref 3.5–5.3)
POTASSIUM SERPL-SCNC: 4.3 MMOL/L — SIGNIFICANT CHANGE UP (ref 3.5–5.3)
PROT ?TM UR-MCNC: 57 MG/DL — HIGH (ref 0–12)
PROT PATTERN SERPL ELPH-IMP: SIGNIFICANT CHANGE UP
PROT/CREAT UR-RTO: 0.5 RATIO — HIGH (ref 0–0.2)
RBC # BLD: 3.3 M/UL — LOW (ref 4.2–5.8)
RBC # FLD: 12.4 % — SIGNIFICANT CHANGE UP (ref 10.3–14.5)
SODIUM SERPL-SCNC: 142 MMOL/L — SIGNIFICANT CHANGE UP (ref 135–145)
SPECIMEN SOURCE: SIGNIFICANT CHANGE UP
TOTAL CHOLESTEROL/HDL RATIO MEASUREMENT: 6.1 RATIO — SIGNIFICANT CHANGE UP (ref 3.4–9.6)
TRIGL SERPL-MCNC: 146 MG/DL — SIGNIFICANT CHANGE UP (ref 10–149)
WBC # BLD: 6.9 K/UL — SIGNIFICANT CHANGE UP (ref 3.8–10.5)
WBC # FLD AUTO: 6.9 K/UL — SIGNIFICANT CHANGE UP (ref 3.8–10.5)

## 2018-01-31 PROCEDURE — 93010 ELECTROCARDIOGRAM REPORT: CPT

## 2018-01-31 PROCEDURE — 71045 X-RAY EXAM CHEST 1 VIEW: CPT | Mod: 26

## 2018-01-31 PROCEDURE — 78582 LUNG VENTILAT&PERFUS IMAGING: CPT | Mod: 26

## 2018-01-31 RX ORDER — CEFTRIAXONE 500 MG/1
1000 INJECTION, POWDER, FOR SOLUTION INTRAMUSCULAR; INTRAVENOUS ONCE
Qty: 0 | Refills: 0 | Status: COMPLETED | OUTPATIENT
Start: 2018-01-31 | End: 2018-01-31

## 2018-01-31 RX ORDER — CEFTRIAXONE 500 MG/1
1000 INJECTION, POWDER, FOR SOLUTION INTRAMUSCULAR; INTRAVENOUS EVERY 24 HOURS
Qty: 0 | Refills: 0 | Status: DISCONTINUED | OUTPATIENT
Start: 2018-02-01 | End: 2018-02-04

## 2018-01-31 RX ORDER — CALCIUM CARBONATE 500(1250)
2 TABLET ORAL EVERY 4 HOURS
Qty: 0 | Refills: 0 | Status: DISCONTINUED | OUTPATIENT
Start: 2018-01-31 | End: 2018-02-09

## 2018-01-31 RX ORDER — BUDESONIDE AND FORMOTEROL FUMARATE DIHYDRATE 160; 4.5 UG/1; UG/1
2 AEROSOL RESPIRATORY (INHALATION)
Qty: 0 | Refills: 0 | Status: DISCONTINUED | OUTPATIENT
Start: 2018-01-31 | End: 2018-02-09

## 2018-01-31 RX ORDER — CEFTRIAXONE 500 MG/1
INJECTION, POWDER, FOR SOLUTION INTRAMUSCULAR; INTRAVENOUS
Qty: 0 | Refills: 0 | Status: DISCONTINUED | OUTPATIENT
Start: 2018-01-31 | End: 2018-01-31

## 2018-01-31 RX ORDER — CEFTRIAXONE 500 MG/1
INJECTION, POWDER, FOR SOLUTION INTRAMUSCULAR; INTRAVENOUS
Qty: 0 | Refills: 0 | Status: DISCONTINUED | OUTPATIENT
Start: 2018-01-31 | End: 2018-02-04

## 2018-01-31 RX ADMIN — CEFTRIAXONE 1000 MILLIGRAM(S): 500 INJECTION, POWDER, FOR SOLUTION INTRAMUSCULAR; INTRAVENOUS at 08:18

## 2018-01-31 RX ADMIN — Medication 3 MILLILITER(S): at 07:40

## 2018-01-31 RX ADMIN — Medication 3 MILLILITER(S): at 19:45

## 2018-01-31 RX ADMIN — Medication 30 MILLIGRAM(S): at 12:36

## 2018-01-31 RX ADMIN — Medication 110 MILLIGRAM(S): at 17:22

## 2018-01-31 RX ADMIN — Medication 2 TABLET(S): at 22:34

## 2018-01-31 RX ADMIN — Medication 40 MILLIGRAM(S): at 05:52

## 2018-01-31 RX ADMIN — Medication 110 MILLIGRAM(S): at 08:19

## 2018-01-31 RX ADMIN — Medication 3 MILLILITER(S): at 14:05

## 2018-01-31 RX ADMIN — Medication 40 MILLIGRAM(S): at 17:22

## 2018-01-31 RX ADMIN — Medication 3 MILLILITER(S): at 16:12

## 2018-01-31 RX ADMIN — Medication 81 MILLIGRAM(S): at 12:36

## 2018-01-31 RX ADMIN — ATORVASTATIN CALCIUM 40 MILLIGRAM(S): 80 TABLET, FILM COATED ORAL at 22:33

## 2018-01-31 RX ADMIN — SODIUM CHLORIDE 100 MILLILITER(S): 9 INJECTION INTRAMUSCULAR; INTRAVENOUS; SUBCUTANEOUS at 05:59

## 2018-01-31 NOTE — PHYSICAL THERAPY INITIAL EVALUATION ADULT - DIAGNOSIS, PT EVAL
Sepsis secondary to flu, troponemia, acute on chronic hypoxic resp failure secondary to COPD exacerbation, thrombocytopenia.

## 2018-01-31 NOTE — PHYSICAL THERAPY INITIAL EVALUATION ADULT - GENERAL OBSERVATIONS, REHAB EVAL
Pt found supine in bed with venti mask, tele monitor, and bed alarm activated. Pt O2 sat on venti mask supine 85%. Pt with no c/o pain, and family member present.

## 2018-01-31 NOTE — CONSULT NOTE ADULT - SUBJECTIVE AND OBJECTIVE BOX
Patient is a 82y Male with PMH of CKD3 (baseline SCr 1.8 on 2016), HTN, hyperlipidemia, BPH, current smoker sent by PCP Dr. Miller due to hypoxia 67%, hypotension and weakness. Also with low appetite and dysuria and instability when walking. On Admission, found to have +Influenza and ZELDA (B/Cr 51/4.04). Called for renal evaluation.    Denies h/o kidney disease. Does not see outside nephrologist. Minimal po intake. +dysuria, no hematuria, no h/o stones per patient. Takes max dose ARB as outpatient.    Receiving IVF - NS@100ml/h, on supplemental oxygen. Also receiving Tamiflu, IV steroids and Ceftriaxone. Scheduled for V/Q scan today.       PAST MEDICAL & SURGICAL HISTORY:  Anxiety  HTN (hypertension)      MEDICATIONS  (STANDING):  ALBUTerol/ipratropium for Nebulization 3 milliLiter(s) Nebulizer every 4 hours  aspirin  chewable 81 milliGRAM(s) Oral daily  atorvastatin 40 milliGRAM(s) Oral at bedtime  buDESOnide 160 MICROgram(s)/formoterol 4.5 MICROgram(s) Inhaler 2 Puff(s) Inhalation two times a day  cefTRIAXone Injectable.      doxycycline IVPB 100 milliGRAM(s) IV Intermittent every 12 hours  doxycycline IVPB      methylPREDNISolone sodium succinate Injectable 40 milliGRAM(s) IV Push two times a day  oseltamivir 30 milliGRAM(s) Oral daily  sodium chloride 0.9%. 1000 milliLiter(s) (100 mL/Hr) IV Continuous <Continuous>      Allergies    No Known Allergies    Intolerances        SOCIAL HISTORY:  +current smoker, no IVDU      REVIEW OF SYSTEMS:    CONSTITUTIONAL: ++weakness, fevers and chills  EYES/ENT: No visual changes;  No vertigo or throat pain   NECK: No pain or stiffness  RESPIRATORY: ++cough, +wheezing, and shortness of breath, no hemoptysis  CARDIOVASCULAR: No chest pain or palpitations  GASTROINTESTINAL: No abdominal or epigastric pain. No nausea, vomiting, or hematemesis; No diarrhea or constipation. No melena or hematochezia.  GENITOURINARY: ++ dysuria, no frequency or hematuria  NEUROLOGICAL: No numbness ++weakness  SKIN: No itching, burning, rashes, or lesions   All other review of systems is negative unless indicated above.    Vital Signs Last 24 Hrs  T(C): 36.9 (31 Jan 2018 05:30), Max: 37.1 (30 Jan 2018 20:54)  T(F): 98.4 (31 Jan 2018 05:30), Max: 98.7 (30 Jan 2018 20:54)  HR: 67 (31 Jan 2018 05:30) (55 - 75)  BP: 107/53 (31 Jan 2018 05:30) (102/43 - 119/63)  BP(mean): --  RR: 17 (31 Jan 2018 05:30) (16 - 17)  SpO2: 94% (31 Jan 2018 05:30) (93% - 100%)      PHYSICAL EXAM:    Constitutional: mild distress, awake, responsive  HEENT: PERRLA, EOMI,  MMM  Neck: No LAD, No JVD  Respiratory: L crackles throughout, R CTA  Cardiovascular: S1 and S2  Gastrointestinal: BS+, soft, ND, +suprapubic tenderness  Extremities: No peripheral edema  Neurological: A/O x 3, no focal deficits  Psychiatric: Normal mood, normal affect  : No Schreiber  Skin: No rashes  Access: Not applicable    LABS:                        9.9    6.9   )-----------( 73       ( 31 Jan 2018 07:33 )             29.8       142    |  108    |  60     ----------------------------<  142       31 Jan 2018 07:33  4.3     |  23     |  3.19     138    |  103    |  55     ----------------------------<  243       30 Jan 2018 04:52  4.2     |  27     |  3.61     137    |  103    |  53     ----------------------------<  106       29 Jan 2018 21:54  4.4     |  27     |  3.59     Ca    8.1        31 Jan 2018 07:33  Ca    8.6        30 Jan 2018 04:52        TPro  6.5    /  Alb  x      /  TBili  x      /        29 Jan 2018 21:54  DBili  x      /  AST  x      /  ALT  x      /  AlkPhos  x        TPro  7.0    /  Alb  3.1    /  TBili  0.6    /        29 Jan 2018 17:22  DBili  x      /  AST  55     /  ALT  37     /  AlkPhos  98           Urinalysis (01.29.18 @ 01:07)    Glucose Qualitative, Urine: Negative mg/dL    Blood, Urine: Moderate    pH Urine: 5.0    Color: Yellow    Urine Appearance: Clear    Bilirubin: Negative    Ketone - Urine: Negative    Specific Gravity: 1.020    Protein, Urine: 30 mg/dL    Urobilinogen: Negative mg/dL    Nitrite: Negative    Leukocyte Esterase Concentration: Negative                RADIOLOGY & ADDITIONAL STUDIES:      < from: CT Abdomen and Pelvis No Cont (01.29.18 @ 23:23) >  EXAM:  CT ABDOMEN AND PELVIS                          EXAM:  CT CHEST                            PROCEDURE DATE:  01/29/2018          INTERPRETATION:  CLINICAL INFORMATION: Wheezing, cough, pain    COMPARISON: None    PROCEDURE:   CT of the Chest, Abdomen and Pelvis was performed without intravenous   contrast.   Intravenous contrast: None.  Oral contrast: None.  Sagittal and coronal reformats were performed.  FINDINGS:    CHEST:     LUNGS, LARGE AIRWAYS, PLEURA: There is mild peribronchial thickening.   Mild scattered tree-in-bud opacities noted mainly along the lower lobes,   lingula and right middle lobe . No pneumothorax or pleural effusion.  VESSELS: Coronary and aortic atherosclerosis  HEART: Heart size is normal. No pericardial effusion.  MEDIASTINUM AND THANH: No lymphadenopathy.  CHEST WALL AND LOWER NECK: Degenerative changes    ABDOMEN AND PELVIS:    Noncontrast evaluation of the following:  LIVER: Within normal limits.  GALLBLADDER: Within normal limits.  SPLEEN: Within normal limits.  PANCREAS: Within normal limits.  ADRENALS: Within normal limits.  KIDNEYS/URETERS: Left renal lesions are not characterized on this   noncontrast examination. No hydronephrosis. Nonspecific bilateral   perinephric stranding.    BLADDER: Foci of high density along the posterior margin of the bladder   may represent layering bladder calculi versus wall calcifications.   Incidental diverticulum noted along the fundus.  REPRODUCTIVE ORGANS: Enlarged prostate    BOWEL: Limited without contrast. No evidence of bowel obstruction.   Scattered diverticula without evidence of diverticulitis. Normal appendix  PERITONEUM: No ascites.  VESSELS:  Atherosclerosis. Mild ectasia of the infrarenal abdominal aorta  RETROPERITONEUM: No lymphadenopathy  ABDOMINAL WALL: Tiny fat-containing inguinal hernias  BONES: Degenerative changes    IMPRESSION:    Scattered tree-in-bud opacities along the lungs may represent small   airways disease/infection. No dense consolidation is seen    No evidence of bowel obstruction.    Foci of high density along the urinary bladder posteriorly may represent   multiple layering bladder calculi versus wall calcifications. Correlate   with urologic examination    < end of copied text >

## 2018-01-31 NOTE — PROGRESS NOTE ADULT - SUBJECTIVE AND OBJECTIVE BOX
Chief Complaint/Reason for Admission: weakness, low blood pressure and hypoxia	    History of Present Illness: 	  Pt is an 83 y/o gentleman with a PMHx of HTN, BPH, anxiety and current smoking who was sent to the ER from Dr. Miller's office.  He was having weakness/loss of balance and tremors for 4 days with decreased oral intake.  The office reports pt was suddenly found to be  hypoxic to 67% on RA, with HR 31 and BP 80/60, Temp 99F.   EMS reported  pt had normal vital signs, but then en route pt's blood pressure dropped to 80/40, his O2 sat was fluctuating, and pt was cyanotic with +cough at baseline.    Pt has not been eating much for the last two days, due to poor appetite.  No abd complaints. He reports intermittent burning with urination.  His wife reports he is barely able to walk and has been stumbling.    Pt denies palpitations , chest pain, SOB, fever, chills, n/v, edema or calf pain.  He has a chronic wet cough and runny nose which family and pt reports is unchanged.       Pt received a PNA shot 2 weeks ago.  He does not like to see the doctor.    1/31: Patient is comfortably lying down, Venti - mask on sating high 80s. Denies any HA, CP, SOB. No overnight fevers, chills or shakes. vitals reviewed. pending V/Q scan today given patient being hypoxic.     REVIEW OF SYSTEMS:  as per HPI    Physical Exam:   GENERAL APPEARANCE:  elderly, deconditioned, frail  T(C): 36.9 (31 Jan 2018 05:30), Max: 37.1 (30 Jan 2018 20:54)  T(F): 98.4 (31 Jan 2018 05:30), Max: 98.7 (30 Jan 2018 20:54)  HR: 67 (31 Jan 2018 05:30) (55 - 75)  BP: 107/53 (31 Jan 2018 05:30) (102/43 - 119/63)  RR: 17 (31 Jan 2018 05:30) (16 - 17)  SpO2: 94% (31 Jan 2018 05:30) (93% - 100%)  HEENT:  Head is normocephalic    Skin:  dry  NECK:  no JVD  HEART:  Regular rate and rhythm. normal S1 and S2, No M/R/G  LUNGS:  decreased lung sounds /  ronchi  ABDOMEN:  Soft, nontender, nondistended with good bowel sounds heard  EXTREMITIES:  no swelling  NEUROLOGICAL:  limited neuro exam    Labs:     pending labs.     CT of the chest:   Scattered tree-in-bud opacities along the lungs may represent small   airways disease/infection. No dense consolidation is seen    No evidence of bowel obstruction.    Foci of high density along the urinary bladder posteriorly may represent   multiple layering bladder calculi versus wall calcifications. Correlate   with urologic examination        # Sepsis secondary to viral illness (flue)  - started on Tamiflue 30 mg po qdaily in a setting of renal failure  - Dupplex LE neg for DVT  - stop heparin drip.  given patient remained hypoxic overnight, we will do a V/Q scan today  - hold metoprolol and flomax for now  - care discussed with Dr. Santacruz, concern was raised in regards of the thrombocytopenia and patient being on heparin drip. If persistant hypoxia will do VQ scan in the am.     # acute on chronic hypoxic respiratory failure secondary to acute COPD excercabation / CAP  - Solumedrol 125mg given in the ER, will cont 40mg BID  - IV Rocephin /  doxy start date 1/31  - cont nebs  - smoking cessation    # Elevated troponin secondary to demand ischemia secondary to flue  - normalized  - started on ASA/STATIN    # ARF (tremulousness likely due to Uremia) / r/o MM / obstruction  - cont fluids  - hold losartan  - US and CT bladder  - SPEP and UPEP, Bence Salgado proteins    # Thrombocytopenia, anemia, elevated ESR  - will closely trend labs  - as per family patient has never had a low plt count - needs workup    # DVT prophylaxis  - venodynes

## 2018-01-31 NOTE — PHYSICAL THERAPY INITIAL EVALUATION ADULT - PERTINENT HX OF CURRENT PROBLEM, REHAB EVAL
Pt admitted to  secondary to weakess, low BP, and hypoxia. Pt also with decrease po intake. Troponin: 1/30- 0.046. H/H- 9.9/29.8.

## 2018-01-31 NOTE — PHYSICAL THERAPY INITIAL EVALUATION ADULT - LEVEL OF INDEPENDENCE: SUPINE/SIT, REHAB EVAL
Did not attempt OOB at this time secondary to pt with O2 sat on venti mask 85% supine. Pt also going for VQ scan to r/o PE and having chest x-ray.

## 2018-01-31 NOTE — PROGRESS NOTE ADULT - SUBJECTIVE AND OBJECTIVE BOX
81 y/o gentleman with a PMHx of HTN, BPH, anxiety and current smoking who was sent to the ER from Dr. Miller's office.  He was having weakness/loss of balance and tremors for 4 days with decreased oral intake.  The office reports pt was suddenly found to be  hypoxic to 67% on RA, with HR 31 and BP 80/60, Temp 99F.   EMS reported  pt had normal vital signs, but then en route pt's blood pressure dropped to 80/40, his O2 sat was fluctuating, and pt was cyanotic with +cough at baseline.    Pt has not been eating much for the last two days, due to poor appetite.  No abd complaints. He reports intermittent burning with urination.  His wife reports he is barely able to walk and has been stumbling.    Pt denies palpitations , chest pain, SOB, fever, chills, n/v, edema or calf pain.  He has a chronic wet cough and runny nose which family and pt reports is unchanged.   Pt received a PNA shot 2 weeks ago.  He does not like to see the doctor. Being treated for influenza and dehydration    Today, he is more alert. Breathing is better. Wife at bedside, please he is improving. No cardiac events overnight.    PMed/Surg Hx  HTN,   BPH,   Tobacco Dependance 60 pack years  Bronchitis/Likely COPD  anxiety   Neuropathy  Skin cancer lesions removed from face and back    CURRENT CARDIAC WORKUP:   None    Allergies:   No Known Allergies      MEDICATIONS  (STANDING):  ALBUTerol/ipratropium for Nebulization 3 milliLiter(s) Nebulizer every 4 hours  aspirin  chewable 81 milliGRAM(s) Oral daily  atorvastatin 40 milliGRAM(s) Oral at bedtime  buDESOnide 160 MICROgram(s)/formoterol 4.5 MICROgram(s) Inhaler 2 Puff(s) Inhalation two times a day  cefTRIAXone Injectable.      doxycycline IVPB 100 milliGRAM(s) IV Intermittent every 12 hours  doxycycline IVPB      methylPREDNISolone sodium succinate Injectable 40 milliGRAM(s) IV Push two times a day  oseltamivir 30 milliGRAM(s) Oral daily  sodium chloride 0.9%. 1000 milliLiter(s) (100 mL/Hr) IV Continuous <Continuous>    MEDICATIONS  (PRN):  acetaminophen   Tablet. 650 milliGRAM(s) Oral every 6 hours PRN Mild Pain (1 - 3)      ROS:     Detailed ten system ROS was performed and was negative except for history as eluded to above.    no fever  no chills  no nausea  no vomiting  no diarrhea  no constipation  no melena  no hematochezia  no chest pain  no palpitations  no sob at rest  + dyspnea on exertion  no cough  no wheezing  no anorexia  no headache  no dizziness  no syncope  no weakness  no myalgia  no dysuria  no polyuria  no hematuria       Vital Signs Last 24 Hrs  T(C): 36.7 (31 Jan 2018 10:05), Max: 37.1 (30 Jan 2018 20:54)  T(F): 98.1 (31 Jan 2018 10:05), Max: 98.7 (30 Jan 2018 20:54)  HR: 83 (31 Jan 2018 10:05) (55 - 83)  BP: 106/45 (31 Jan 2018 10:05) (102/43 - 107/53)  BP(mean): --  RR: 17 (31 Jan 2018 10:05) (16 - 17)  SpO2: 94% (31 Jan 2018 10:05) (93% - 98%)    I&O's Summary      PHYSICAL EXAM:    General:                Comfortable, AAO X 3, in no distress.   HEENT:                  Atraumatic, PERRLA, EOMI, conjunctiva clear.   Neck:                     Supple, no adenopathy, no thyromegaly, no JVD, no bruit.  Back:                     Symmetric, non tender.  Chest:                    Scant rhonchi and expiratory wheeze, B/L symmetric air entry, no tachypnea  Heart:                     S1, S2 normal, no gallop, no murmur.  Abdomen:              Soft, non-tender, bowel sounds active. No palpable masses.  Extremities:           no cyanosis, no edema. Peripheral pulses normal.  Skin:                      Skin color, texture normal. No rashes.  Neurologic:            Grossly nonfocal.       INTERPRETATION OF TELEMETRY: Normal sinus rhythm with no tachy or joseph events     ECG:  NSR, first deg AV block, anterior T wave changes    LABS:                        9.9    6.9   )-----------( 73       ( 31 Jan 2018 07:33 )             29.8     01-31    142  |  108  |  60<H>  ----------------------------<  142<H>  4.3   |  23  |  3.19<H>    Ca    8.1<L>      31 Jan 2018 07:33    TPro  6.5  /  Alb  x   /  TBili  x   /  DBili  x   /  AST  x   /  ALT  x   /  AlkPhos  x   01-29 01-30 @ 22:46  Trop-I 0.022    01-30 @ 15:53  Trop-I 0.020  CK     2084 01-30 @ 04:52  Trop-I 0.046    Pro BNP  1903 01-29 @ 17:22    PT/INR - ( 29 Jan 2018 17:22 )   PT: 12.9 sec;   INR: 1.19 ratio    PTT - ( 30 Jan 2018 07:55 )  PTT:> 200 sec    RADIOLOGY & ADDITIONAL STUDIES:    NM Pulmonary Ventilation/Perfusion Scan (01.31.18 @ 12:32) > IMPRESSION: Abnormal ventilation/perfusion lung scan. Low probability of pulmonary embolus.

## 2018-01-31 NOTE — PHYSICAL THERAPY INITIAL EVALUATION ADULT - ACTIVE RANGE OF MOTION EXAMINATION, REHAB EVAL
no Active ROM deficits were identified/Bilateral shoulder flexion ~ 150 degrees, and bilateral DF neutral.

## 2018-01-31 NOTE — CONSULT NOTE ADULT - SUBJECTIVE AND OBJECTIVE BOX
Patient is a 82y old  Male who presents with a chief complaint of weakness, low blood pressure and hypoxia (2018 19:04)    HPI:  83 y/o gentleman with h/o HTN, BPH, anxiety, COPD, neuropathy, skin Ca lesions s/p removal was admitted on  from Dr. Millre's office for increased weakness, hypoxia. He had weakness/loss of balance and tremors with decreased oral intake x 4 days PTA.  The office reports pt was found to be  hypoxic to 67% on RA, with HR 31 and BP 80/60, Temp 99F.   EMS reported normal vital signs, but then en route to the hospital pt's blood pressure dropped to 80/40, his O2 sat was fluctuating, and pt was cyanotic. Pt has not been eating much for the last two days, due to poor appetite.  No abd complaints. He reports intermittent burning with urination.  His wife reports he is barely able to walk and has been stumbling. In ER, he received ceftriaxone and doxycycline.     PMH: as above  PSH: as above  Meds: per reconciliation sheet, noted below  MEDICATIONS  (STANDING):  ALBUTerol/ipratropium for Nebulization 3 milliLiter(s) Nebulizer every 4 hours  aspirin  chewable 81 milliGRAM(s) Oral daily  atorvastatin 40 milliGRAM(s) Oral at bedtime  buDESOnide 160 MICROgram(s)/formoterol 4.5 MICROgram(s) Inhaler 2 Puff(s) Inhalation two times a day  cefTRIAXone Injectable.      doxycycline IVPB 100 milliGRAM(s) IV Intermittent every 12 hours  doxycycline IVPB      methylPREDNISolone sodium succinate Injectable 40 milliGRAM(s) IV Push two times a day  oseltamivir 30 milliGRAM(s) Oral daily  sodium chloride 0.9%. 1000 milliLiter(s) (100 mL/Hr) IV Continuous <Continuous>    MEDICATIONS  (PRN):  acetaminophen   Tablet. 650 milliGRAM(s) Oral every 6 hours PRN Mild Pain (1 - 3)    Allergies    No Known Allergies    Intolerances      Social: current smoker, no alcohol, no illegal drugs; no recent travel, no exposure to TB  FAMILY HISTORY:    ROS: the patient denies fever, no chills, no HA, no dizziness, no sore throat, no blurry vision, no CP, no palpitations, has SOB, has dry cough, no abdominal pain, no diarrhea, no N/V, no dysuria, no leg pain, no claudication, no rash, no joint aches, no rectal pain or bleeding, no night sweats; has poor appetite    Vital Signs Last 24 Hrs  T(C): 36.7 (2018 10:05), Max: 37.1 (2018 20:54)  T(F): 98.1 (2018 10:05), Max: 98.7 (2018 20:54)  HR: 83 (2018 10:05) (55 - 83)  BP: 106/45 (2018 10:05) (102/43 - 107/53)  BP(mean): --  RR: 17 (2018 10:05) (16 - 17)  SpO2: 94% (2018 10:05) (93% - 98%)  Daily     Daily Weight in k.9 (2018 08:08)    PE:    Constitutional: frail looking  HEENT: NC/AT, EOMI, PERRLA  Neck: supple  Back: no tenderness  Respiratory: crackles at bases; few wheeses  Cardiovascular: S1S2 regular, no murmurs  Abdomen: soft, not tender, not distended, positive BS  Genitourinary: no LN palpable  Rectal: deferred  Musculoskeletal: no muscle tenderness, no joint swelling or tenderness  Extremities: no pedal edema  Neurological: AxOx3, moving all extremities  Skin: no rashes    Labs:                        9.9    6.9   )-----------( 73       ( 2018 07:33 )             29.8         142  |  108  |  60<H>  ----------------------------<  142<H>  4.3   |  23  |  3.19<H>    Ca    8.1<L>      2018 07:33    TPro  6.5  /  Alb  x   /  TBili  x   /  DBili  x   /  AST  x   /  ALT  x   /  AlkPhos  x        LIVER FUNCTIONS - ( 2018 21:54 )  Alb: x     / Pro: 6.5 g/dL / ALK PHOS: x     / ALT: x     / AST: x     / GGT: x           Rapid Respiratory Viral Panel (18 @ 20:41)    Rapid RVP Result: Detected: The FilmArray RVP Rapid uses polymerase chain reaction (PCR) and melt  curve analysis to screen for adenovirus; coronavirus HKU1, NL63, 229E,  OC43; human metapneumovirus (hMPV); human enterovirus/rhinovirus  (Entero/RV); influenza A; influenza A/H1;influenza A/H3; influenza  A/H1-2009; influenza B; parainfluenza viruses 1, 2, 3, 4; respiratory  syncytial virus; Bordetella pertussis; Mycoplasma pneumoniae; and  Chlamydophila pneumoniae.    Influenza AH3 (RapRVP): Detected: Test Name:flu a h3  Called by:_vvsosaes  Called to:_    MHoung RN  Read back 2 Pt IDs:_y Read back values:_y Date/Tm:_18 01:58          Radiology:    < from: CT Chest No Cont (18 @ 23:21) >  Scattered tree-in-bud opacities along the lungs may represent small   airways disease/infection. No dense consolidation is seen    No evidence of bowel obstruction.    Foci of high density along the urinary bladder posteriorly may represent   multiple layering bladder calculi versus wall calcifications. Correlate   with urologic examination    < end of copied text >      Advanced directives addressed: full resuscitation

## 2018-02-01 LAB
ANION GAP SERPL CALC-SCNC: 7 MMOL/L — SIGNIFICANT CHANGE UP (ref 5–17)
BUN SERPL-MCNC: 63 MG/DL — HIGH (ref 7–23)
CALCIUM SERPL-MCNC: 8.5 MG/DL — SIGNIFICANT CHANGE UP (ref 8.5–10.1)
CHLORIDE SERPL-SCNC: 113 MMOL/L — HIGH (ref 96–108)
CO2 SERPL-SCNC: 24 MMOL/L — SIGNIFICANT CHANGE UP (ref 22–31)
CREAT SERPL-MCNC: 2.94 MG/DL — HIGH (ref 0.5–1.3)
CULTURE RESULTS: NO GROWTH — SIGNIFICANT CHANGE UP
GLUCOSE SERPL-MCNC: 174 MG/DL — HIGH (ref 70–99)
HCT VFR BLD CALC: 30 % — LOW (ref 39–50)
HGB BLD-MCNC: 9.8 G/DL — LOW (ref 13–17)
MCHC RBC-ENTMCNC: 29.6 PG — SIGNIFICANT CHANGE UP (ref 27–34)
MCHC RBC-ENTMCNC: 32.6 GM/DL — SIGNIFICANT CHANGE UP (ref 32–36)
MCV RBC AUTO: 90.5 FL — SIGNIFICANT CHANGE UP (ref 80–100)
PLATELET # BLD AUTO: 89 K/UL — LOW (ref 150–400)
POTASSIUM SERPL-MCNC: 4.4 MMOL/L — SIGNIFICANT CHANGE UP (ref 3.5–5.3)
POTASSIUM SERPL-SCNC: 4.4 MMOL/L — SIGNIFICANT CHANGE UP (ref 3.5–5.3)
RBC # BLD: 3.31 M/UL — LOW (ref 4.2–5.8)
RBC # FLD: 12.9 % — SIGNIFICANT CHANGE UP (ref 10.3–14.5)
SODIUM SERPL-SCNC: 144 MMOL/L — SIGNIFICANT CHANGE UP (ref 135–145)
SPECIMEN SOURCE: SIGNIFICANT CHANGE UP
WBC # BLD: 7.2 K/UL — SIGNIFICANT CHANGE UP (ref 3.8–10.5)
WBC # FLD AUTO: 7.2 K/UL — SIGNIFICANT CHANGE UP (ref 3.8–10.5)

## 2018-02-01 RX ORDER — SODIUM CHLORIDE 9 MG/ML
1000 INJECTION, SOLUTION INTRAVENOUS
Qty: 0 | Refills: 0 | Status: DISCONTINUED | OUTPATIENT
Start: 2018-02-01 | End: 2018-02-04

## 2018-02-01 RX ADMIN — ATORVASTATIN CALCIUM 40 MILLIGRAM(S): 80 TABLET, FILM COATED ORAL at 21:36

## 2018-02-01 RX ADMIN — Medication 40 MILLIGRAM(S): at 06:02

## 2018-02-01 RX ADMIN — Medication 110 MILLIGRAM(S): at 06:01

## 2018-02-01 RX ADMIN — Medication 40 MILLIGRAM(S): at 17:30

## 2018-02-01 RX ADMIN — BUDESONIDE AND FORMOTEROL FUMARATE DIHYDRATE 2 PUFF(S): 160; 4.5 AEROSOL RESPIRATORY (INHALATION) at 08:51

## 2018-02-01 RX ADMIN — SODIUM CHLORIDE 100 MILLILITER(S): 9 INJECTION, SOLUTION INTRAVENOUS at 13:47

## 2018-02-01 RX ADMIN — Medication 3 MILLILITER(S): at 19:52

## 2018-02-01 RX ADMIN — CEFTRIAXONE 1000 MILLIGRAM(S): 500 INJECTION, POWDER, FOR SOLUTION INTRAMUSCULAR; INTRAVENOUS at 06:01

## 2018-02-01 RX ADMIN — Medication 3 MILLILITER(S): at 08:50

## 2018-02-01 RX ADMIN — Medication 3 MILLILITER(S): at 14:10

## 2018-02-01 RX ADMIN — Medication 100 MILLIGRAM(S): at 17:31

## 2018-02-01 RX ADMIN — Medication 81 MILLIGRAM(S): at 11:43

## 2018-02-01 RX ADMIN — Medication 30 MILLIGRAM(S): at 11:43

## 2018-02-01 NOTE — CONSULT NOTE ADULT - SUBJECTIVE AND OBJECTIVE BOX
Patient is a 82y old  Male who presents with a chief complaint of weakness, low blood pressure and hypoxia (29 Jan 2018 19:04)      HPI:  Pt is an 83 y/o gentleman with a PMHx of HTN, BPH, anxiety and current smoking who was sent to the ER from Dr. Miller's office.  He was having weakness/loss of balance and tremors for 4 days with decreased oral intake.  The office reports pt was suddenly found to be  hypoxic to 67% on RA, with HR 31 and BP 80/60, Temp 99F.   EMS reported  pt had normal vital signs, but then en route pt's blood pressure dropped to 80/40, his O2 sat was fluctuating, and pt was cyanotic with +cough at baseline.    Pt has not been eating much for the last two days, due to poor appetite.  No abd complaints. He reports intermittent burning with urination.  His wife reports he is barely able to walk and has been stumbling.    Pt denies palpitations , chest pain, SOB, fever, chills, n/v, edema or calf pain.  He has a chronic wet cough and runny nose which family and pt reports is unchanged.       Pt received a PNA shot 2 weeks ago.  He does not like to see the doctor.    2/1 pt is seen and examined followed at [facility] by NP, Renetta Razo  she has not been eval by pulm and does not to answer too many questions  pos smoking hx  feels better since admit  no cp     PMed/Surg Hx   HTN,    BPH,   Tobacco Dependance 60 pack years  Bronchitis/Likely COPD  anxiety   Neuropathy  Skin cancer lesions removed from face and back    Fam Hx:  Non-contrib to current adm (29 Jan 2018 19:04)      PAST MEDICAL & SURGICAL HISTORY:  Anxiety  HTN (hypertension)      PREVIOUS DIAGNOSTIC TESTING:      MEDICATIONS  (STANDING):  ALBUTerol/ipratropium for Nebulization 3 milliLiter(s) Nebulizer every 4 hours  aspirin  chewable 81 milliGRAM(s) Oral daily  atorvastatin 40 milliGRAM(s) Oral at bedtime  buDESOnide 160 MICROgram(s)/formoterol 4.5 MICROgram(s) Inhaler 2 Puff(s) Inhalation two times a day  cefTRIAXone Injectable.      cefTRIAXone Injectable. 1000 milliGRAM(s) IV Push every 24 hours  doxycycline IVPB 100 milliGRAM(s) IV Intermittent every 12 hours  doxycycline IVPB      methylPREDNISolone sodium succinate Injectable 40 milliGRAM(s) IV Push two times a day  oseltamivir 30 milliGRAM(s) Oral daily  sodium chloride 0.9%. 1000 milliLiter(s) (100 mL/Hr) IV Continuous <Continuous>    MEDICATIONS  (PRN):  acetaminophen   Tablet. 650 milliGRAM(s) Oral every 6 hours PRN Mild Pain (1 - 3)  calcium carbonate 500 mG (Tums) Chewable 2 Tablet(s) Chew every 4 hours PRN Heartburn      FAMILY HISTORY:      SOCIAL HISTORY:  pos smoking hx    REVIEW OF SYSTEM:  Pertinent items are noted in HPI.  Constitutional negative for chills, fevers, sweats and weight loss  throat, and face:  negative for epistaxis, pos nasal congestion, sore throat and   tinnitus  Respiratory: pos  for cough, dyspnea on exertion, no pleuritic chest pain  and wheezing  Cardiovascular:  negative for chest pain, dyspnea and palpitations  Gastrointestinal: negative for abdominal pain, diarrhea, nausea and vomiting  Genitourinary: negative for dysuria, frequency and urinary incontinence  Skin:  negative for redness, rash, pruritus, swelling, dryness and   fissures  Hematologic/lymphatic: negative for bleeding and easy bruising  Musculoskeletal: negative for arthralgias, back pain and muscle weakness  Neurological: negative for dizziness, headaches, seizures and tremors  Behavioral/Psych:  negative for mood change, depression, suicidal attempts    Allergic/Immunologic: negative for anaphylaxis, angioedema and urticaria    Vital Signs Last 24 Hrs  T(C): 36.6 (01 Feb 2018 05:23), Max: 36.7 (31 Jan 2018 10:05)  T(F): 97.8 (01 Feb 2018 05:23), Max: 98.1 (31 Jan 2018 10:05)  HR: 76 (01 Feb 2018 05:23) (62 - 100)  BP: 130/96 (01 Feb 2018 05:23) (106/45 - 143/55)  BP(mean): --  RR: 18 (31 Jan 2018 20:32) (17 - 18)  SpO2: 93% (01 Feb 2018 05:23) (92% - 95%)    I&O's Summary    PHYSICAL EXAM  General Appearance: cooperative, no acute distress,   HEENT: PERRL, conjunctiva clear, EOM's intact, non injected pharynx, no exudate, TM   normal  Neck: Supple, , no adenopathy, thyroid: not enlarged, no carotid bruit or JVD  Back: Symmetric, no  tenderness,no soft tissue tenderness  Lungs: diffuse exp wheeze, Bilateral Rhonchi, no dullness  Heart: Regular rate and rhythm, S1, S2 normal, no murmur, rub or gallop  Abdomen: Soft, non-tender, bowel sounds active , no hepatosplenomegaly  Extremities: no cyanosis or edema, no joint swelling  Skin: Skin color, texture normal, no rashes   Neurologic: Alert and oriented X3 , cranial nerves intact, sensory and motor normal,    ECG:    LABS:                          9.8    7.2   )-----------( 89       ( 01 Feb 2018 05:35 )             30.0     01-31    142  |  108  |  60<H>  ----------------------------<  142<H>  4.3   |  23  |  3.19<H>    Ca    8.1<L>      31 Jan 2018 07:33      CARDIAC MARKERS ( 30 Jan 2018 22:46 )  0.022 ng/mL / x     / x     / x     / x      CARDIAC MARKERS ( 30 Jan 2018 15:53 )  0.020 ng/mL / x     / 2084 U/L / x     / x          Lipid Panel  165  27  109  146    Pro BNP  1903 01-29 @ 17:22  D Dimer  -- 01-29 @ 17:22          < from: CT Chest No Cont (01.29.18 @ 23:21) >  IMPRESSION:    Scattered tree-in-bud opacities along the lungs may represent small   airways disease/infection. No dense consolidation is seen    No evidence of bowel obstruction.    Foci of high density along the urinary bladder posteriorly may represent   multiple layering bladder calculi versus wall calcifications. Correlate   with urologic examination    < end of copied text >      RADIOLOGY & ADDITIONAL STUDIES:  < from: Xray Chest 1 View- PORTABLE-Urgent (01.31.18 @ 09:31) >  PROCEDURE DATE:  01/31/2018          INTERPRETATION:  Views:1  Comparison: 01/29/18  History: Hypoxia    The lungs are clear of infiltrates and effusions.     The cardiac and   mediastinal contours appear unremarkable.     Impression:  1. No evidence of acute pulmonary disease.        < end of copied text >  Influenza AH3 (RapRVP): Detected: Test Name:flu a h3  Called by:ying  Called to:Nj Malik RN  Read back 2 Pt IDs:_y Read back values:_y Date/Tm:_01/30/18 01:58 (01.29.18 @ 20:41)    < from: NM Pulmonary Ventilation/Perfusion Scan (01.31.18 @ 12:32) >  COMPARISON: No previous lung V/Q scan for comparison     FINDINGS: There is heterogeneous radiotracer distribution in the lungs on   both the ventilation and the perfusion images. There is matched V/Q   defect in the medial segment of the right middle lobe with no   corresponding chest x-ray opacity. No segmental mismatched V/Q defect is   identified.      IMPRESSION: Abnormal ventilation/perfusion lung scan. Low probability of   pulmonary embolus.      < end of copied text >

## 2018-02-01 NOTE — PROGRESS NOTE ADULT - SUBJECTIVE AND OBJECTIVE BOX
Patient is a 82y old  Male who presents with a chief complaint of weakness, low blood pressure and hypoxia (29 Jan 2018 19:04)    HPI:  83 y/o gentleman with h/o HTN, BPH, anxiety, COPD, neuropathy, skin Ca lesions s/p removal was admitted on 1/29 from Dr. Miller's office for increased weakness, hypoxia. He had weakness/loss of balance and tremors with decreased oral intake x 4 days PTA.  The office reports pt was found to be  hypoxic to 67% on RA, with HR 31 and BP 80/60, Temp 99F.   EMS reported normal vital signs, but then en route to the hospital pt's blood pressure dropped to 80/40, his O2 sat was fluctuating, and pt was cyanotic. Pt has not been eating much for the last two days, due to poor appetite.  No abd complaints. He reports intermittent burning with urination.  His wife reports he is barely able to walk and has been stumbling. In ER, he received ceftriaxone and doxycycline.     Sitting in chair in NAD  Weak looking  Mild SOB  Has dry cough    MEDICATIONS  (STANDING):  ALBUTerol/ipratropium for Nebulization 3 milliLiter(s) Nebulizer every 4 hours  aspirin  chewable 81 milliGRAM(s) Oral daily  atorvastatin 40 milliGRAM(s) Oral at bedtime  buDESOnide 160 MICROgram(s)/formoterol 4.5 MICROgram(s) Inhaler 2 Puff(s) Inhalation two times a day  cefTRIAXone Injectable.      cefTRIAXone Injectable. 1000 milliGRAM(s) IV Push every 24 hours  doxycycline IVPB 100 milliGRAM(s) IV Intermittent every 12 hours  doxycycline IVPB      methylPREDNISolone sodium succinate Injectable 40 milliGRAM(s) IV Push two times a day  oseltamivir 30 milliGRAM(s) Oral daily  sodium chloride 0.9%. 1000 milliLiter(s) (100 mL/Hr) IV Continuous <Continuous>    MEDICATIONS  (PRN):  acetaminophen   Tablet. 650 milliGRAM(s) Oral every 6 hours PRN Mild Pain (1 - 3)  calcium carbonate 500 mG (Tums) Chewable 2 Tablet(s) Chew every 4 hours PRN Heartburn      Vital Signs Last 24 Hrs  T(C): 36.6 (01 Feb 2018 10:00), Max: 36.7 (31 Jan 2018 20:32)  T(F): 97.9 (01 Feb 2018 10:00), Max: 98.1 (31 Jan 2018 20:32)  HR: 72 (01 Feb 2018 10:00) (62 - 100)  BP: 132/54 (01 Feb 2018 10:00) (108/43 - 143/55)  BP(mean): --  RR: 18 (01 Feb 2018 10:00) (17 - 18)  SpO2: 94% (01 Feb 2018 10:00) (92% - 95%)    Physical Exam:    Constitutional: frail looking  HEENT: NC/AT, EOMI, PERRLA  Neck: supple  Back: no tenderness  Respiratory: crackles at bases; few wheeses  Cardiovascular: S1S2 regular, no murmurs  Abdomen: soft, not tender, not distended, positive BS  Genitourinary: no LN palpable  Rectal: deferred  Musculoskeletal: no muscle tenderness, no joint swelling or tenderness  Extremities: no pedal edema  Neurological: AxOx3, moving all extremities  Skin: no rashes    Labs:                        9.9    6.9   )-----------( 73       ( 31 Jan 2018 07:33 )             29.8     01-31    142  |  108  |  60<H>  ----------------------------<  142<H>  4.3   |  23  |  3.19<H>    Ca    8.1<L>      31 Jan 2018 07:33    TPro  6.5  /  Alb  x   /  TBili  x   /  DBili  x   /  AST  x   /  ALT  x   /  AlkPhos  x   01-29     LIVER FUNCTIONS - ( 29 Jan 2018 21:54 )  Alb: x     / Pro: 6.5 g/dL / ALK PHOS: x     / ALT: x     / AST: x     / GGT: x           Rapid Respiratory Viral Panel (01.29.18 @ 20:41)    Rapid RVP Result: Detected: The FilmArray RVP Rapid uses polymerase chain reaction (PCR) and melt  curve analysis to screen for adenovirus; coronavirus HKU1, NL63, 229E,  OC43; human metapneumovirus (hMPV); human enterovirus/rhinovirus  (Entero/RV); influenza A; influenza A/H1;influenza A/H3; influenza  A/H1-2009; influenza B; parainfluenza viruses 1, 2, 3, 4; respiratory  syncytial virus; Bordetella pertussis; Mycoplasma pneumoniae; and  Chlamydophila pneumoniae.    Influenza AH3 (RapRVP): Detected: Test Name:flu a h3  Called by:ying  Called to:Nj Malik RN  Read back 2 Pt IDs:_y Read back values:_y Date/Tm:_01/30/18 01:58      Culture - Blood (collected 29 Jan 2018 17:22)  Source: .Blood None  Preliminary Report (31 Jan 2018 01:02):    No growth to date.    Culture - Blood (collected 29 Jan 2018 17:22)  Source: .Blood None  Preliminary Report (31 Jan 2018 01:02):    No growth to date.    Culture - Urine (collected 29 Jan 2018 01:07)  Source: .Urine Clean Catch (Midstream)  Final Report (31 Jan 2018 11:45):    No growth          Radiology:    < from: CT Chest No Cont (01.29.18 @ 23:21) >  Scattered tree-in-bud opacities along the lungs may represent small   airways disease/infection. No dense consolidation is seen    No evidence of bowel obstruction.    Foci of high density along the urinary bladder posteriorly may represent   multiple layering bladder calculi versus wall calcifications. Correlate   with urologic examination    < end of copied text >      Advanced directives addressed: full resuscitation

## 2018-02-01 NOTE — PROGRESS NOTE ADULT - SUBJECTIVE AND OBJECTIVE BOX
Patient is a 82y Male with PMH of CKD3 (baseline SCr 1.8 on 2016), HTN, hyperlipidemia, BPH, current smoker sent by PCP Dr. Miller due to hypoxia 67%, hypotension and weakness. Also with low appetite and dysuria and instability when walking. On Admission, found to have +Influenza and ZELDA (B/Cr 51/4.04). Called for renal evaluation.    Denies h/o kidney disease. Does not see outside nephrologist. Minimal po intake. +dysuria, no hematuria, no h/o stones per patient. Takes max dose ARB as outpatient.    Receiving IVF - NS@100ml/h, on supplemental oxygen. Also receiving Tamiflu, IV steroids and Ceftriaxone. Scheduled for V/Q scan today.     2/1 - more energy today, talking, awake, no SOB, on IVF      PAST MEDICAL & SURGICAL HISTORY:  Anxiety  HTN (hypertension)      MEDICATIONS  (STANDING):  ALBUTerol/ipratropium for Nebulization 3 milliLiter(s) Nebulizer every 4 hours  aspirin  chewable 81 milliGRAM(s) Oral daily  atorvastatin 40 milliGRAM(s) Oral at bedtime  buDESOnide 160 MICROgram(s)/formoterol 4.5 MICROgram(s) Inhaler 2 Puff(s) Inhalation two times a day  cefTRIAXone Injectable.      cefTRIAXone Injectable. 1000 milliGRAM(s) IV Push every 24 hours  doxycycline IVPB 100 milliGRAM(s) IV Intermittent every 12 hours  doxycycline IVPB      methylPREDNISolone sodium succinate Injectable 40 milliGRAM(s) IV Push two times a day  oseltamivir 30 milliGRAM(s) Oral daily  sodium chloride 0.9%. 1000 milliLiter(s) (100 mL/Hr) IV Continuous <Continuous>        Allergies    No Known Allergies    Intolerances        SOCIAL HISTORY:  +current smoker, no IVDU      REVIEW OF SYSTEMS:    CONSTITUTIONAL: ++weakness, fevers and chills  EYES/ENT: No visual changes;  No vertigo or throat pain   NECK: No pain or stiffness  RESPIRATORY: ++cough, +wheezing, and shortness of breath, no hemoptysis  CARDIOVASCULAR: No chest pain or palpitations  GASTROINTESTINAL: No abdominal or epigastric pain. No nausea, vomiting, or hematemesis; No diarrhea or constipation. No melena or hematochezia.  GENITOURINARY: ++ dysuria, no frequency or hematuria  NEUROLOGICAL: No numbness ++weakness  SKIN: No itching, burning, rashes, or lesions   All other review of systems is negative unless indicated above.    Vital Signs Last 24 Hrs  T(C): 36.6 (01 Feb 2018 10:00), Max: 36.7 (31 Jan 2018 20:32)  T(F): 97.9 (01 Feb 2018 10:00), Max: 98.1 (31 Jan 2018 20:32)  HR: 72 (01 Feb 2018 10:00) (62 - 100)  BP: 132/54 (01 Feb 2018 10:00) (108/43 - 143/55)  BP(mean): --  RR: 18 (01 Feb 2018 10:00) (17 - 18)  SpO2: 94% (01 Feb 2018 10:00) (92% - 95%)      PHYSICAL EXAM:    Constitutional: mild distress, awake, responsive  HEENT: PERRLA, EOMI,  MMM  Neck: No LAD, No JVD  Respiratory: L crackles throughout improved, R CTA  Cardiovascular: S1 and S2  Gastrointestinal: BS+, soft, ND, +suprapubic tenderness  Extremities: No peripheral edema  Neurological: A/O x 3, no focal deficits  Psychiatric: Normal mood, normal affect  : No Schreiber  Skin: No rashes  Access: Not applicable    LABS:                        9.9    6.9   )-----------( 73       ( 31 Jan 2018 07:33 )             29.8     02-01    144  |  113<H>  |  63<H>  ----------------------------<  174<H>  4.4   |  24  |  2.94<H>    Ca    8.5      01 Feb 2018 12:04      142    |  108    |  60     ----------------------------<  142       31 Jan 2018 07:33  4.3     |  23     |  3.19     138    |  103    |  55     ----------------------------<  243       30 Jan 2018 04:52  4.2     |  27     |  3.61     137    |  103    |  53     ----------------------------<  106       29 Jan 2018 21:54  4.4     |  27     |  3.59     Ca    8.1        31 Jan 2018 07:33  Ca    8.6        30 Jan 2018 04:52        TPro  6.5    /  Alb  x      /  TBili  x      /        29 Jan 2018 21:54  DBili  x      /  AST  x      /  ALT  x      /  AlkPhos  x        TPro  7.0    /  Alb  3.1    /  TBili  0.6    /        29 Jan 2018 17:22  DBili  x      /  AST  55     /  ALT  37     /  AlkPhos  98           Urinalysis (01.29.18 @ 01:07)    Glucose Qualitative, Urine: Negative mg/dL    Blood, Urine: Moderate    pH Urine: 5.0    Color: Yellow    Urine Appearance: Clear    Bilirubin: Negative    Ketone - Urine: Negative    Specific Gravity: 1.020    Protein, Urine: 30 mg/dL    Urobilinogen: Negative mg/dL    Nitrite: Negative    Leukocyte Esterase Concentration: Negative                RADIOLOGY & ADDITIONAL STUDIES:      < from: CT Abdomen and Pelvis No Cont (01.29.18 @ 23:23) >  EXAM:  CT ABDOMEN AND PELVIS                          EXAM:  CT CHEST                            PROCEDURE DATE:  01/29/2018          INTERPRETATION:  CLINICAL INFORMATION: Wheezing, cough, pain    COMPARISON: None    PROCEDURE:   CT of the Chest, Abdomen and Pelvis was performed without intravenous   contrast.   Intravenous contrast: None.  Oral contrast: None.  Sagittal and coronal reformats were performed.  FINDINGS:    CHEST:     LUNGS, LARGE AIRWAYS, PLEURA: There is mild peribronchial thickening.   Mild scattered tree-in-bud opacities noted mainly along the lower lobes,   lingula and right middle lobe . No pneumothorax or pleural effusion.  VESSELS: Coronary and aortic atherosclerosis  HEART: Heart size is normal. No pericardial effusion.  MEDIASTINUM AND THANH: No lymphadenopathy.  CHEST WALL AND LOWER NECK: Degenerative changes    ABDOMEN AND PELVIS:    Noncontrast evaluation of the following:  LIVER: Within normal limits.  GALLBLADDER: Within normal limits.  SPLEEN: Within normal limits.  PANCREAS: Within normal limits.  ADRENALS: Within normal limits.  KIDNEYS/URETERS: Left renal lesions are not characterized on this   noncontrast examination. No hydronephrosis. Nonspecific bilateral   perinephric stranding.    BLADDER: Foci of high density along the posterior margin of the bladder   may represent layering bladder calculi versus wall calcifications.   Incidental diverticulum noted along the fundus.  REPRODUCTIVE ORGANS: Enlarged prostate    BOWEL: Limited without contrast. No evidence of bowel obstruction.   Scattered diverticula without evidence of diverticulitis. Normal appendix  PERITONEUM: No ascites.  VESSELS:  Atherosclerosis. Mild ectasia of the infrarenal abdominal aorta  RETROPERITONEUM: No lymphadenopathy  ABDOMINAL WALL: Tiny fat-containing inguinal hernias  BONES: Degenerative changes    IMPRESSION:    Scattered tree-in-bud opacities along the lungs may represent small   airways disease/infection. No dense consolidation is seen    No evidence of bowel obstruction.    Foci of high density along the urinary bladder posteriorly may represent   multiple layering bladder calculi versus wall calcifications. Correlate   with urologic examination    < end of copied text >

## 2018-02-02 LAB
ALBUMIN SERPL ELPH-MCNC: 2.9 G/DL — LOW (ref 3.3–5)
ALP SERPL-CCNC: 81 U/L — SIGNIFICANT CHANGE UP (ref 40–120)
ALT FLD-CCNC: 47 U/L — SIGNIFICANT CHANGE UP (ref 12–78)
ANION GAP SERPL CALC-SCNC: 6 MMOL/L — SIGNIFICANT CHANGE UP (ref 5–17)
AST SERPL-CCNC: 63 U/L — HIGH (ref 15–37)
BILIRUB SERPL-MCNC: 0.5 MG/DL — SIGNIFICANT CHANGE UP (ref 0.2–1.2)
BUN SERPL-MCNC: 60 MG/DL — HIGH (ref 7–23)
CALCIUM SERPL-MCNC: 9.1 MG/DL — SIGNIFICANT CHANGE UP (ref 8.5–10.1)
CHLORIDE SERPL-SCNC: 113 MMOL/L — HIGH (ref 96–108)
CO2 SERPL-SCNC: 25 MMOL/L — SIGNIFICANT CHANGE UP (ref 22–31)
CREAT SERPL-MCNC: 2.4 MG/DL — HIGH (ref 0.5–1.3)
ERYTHROCYTE [SEDIMENTATION RATE] IN BLOOD: 47 MM/HR — HIGH (ref 0–20)
GLUCOSE SERPL-MCNC: 128 MG/DL — HIGH (ref 70–99)
HCT VFR BLD CALC: 31.5 % — LOW (ref 39–50)
HGB BLD-MCNC: 10.4 G/DL — LOW (ref 13–17)
MCHC RBC-ENTMCNC: 29.6 PG — SIGNIFICANT CHANGE UP (ref 27–34)
MCHC RBC-ENTMCNC: 33 GM/DL — SIGNIFICANT CHANGE UP (ref 32–36)
MCV RBC AUTO: 89.9 FL — SIGNIFICANT CHANGE UP (ref 80–100)
PLATELET # BLD AUTO: 100 K/UL — LOW (ref 150–400)
POTASSIUM SERPL-MCNC: 4.3 MMOL/L — SIGNIFICANT CHANGE UP (ref 3.5–5.3)
POTASSIUM SERPL-SCNC: 4.3 MMOL/L — SIGNIFICANT CHANGE UP (ref 3.5–5.3)
PROT SERPL-MCNC: 6.9 GM/DL — SIGNIFICANT CHANGE UP (ref 6–8.3)
RBC # BLD: 3.51 M/UL — LOW (ref 4.2–5.8)
RBC # FLD: 12.6 % — SIGNIFICANT CHANGE UP (ref 10.3–14.5)
SODIUM SERPL-SCNC: 144 MMOL/L — SIGNIFICANT CHANGE UP (ref 135–145)
WBC # BLD: 6.7 K/UL — SIGNIFICANT CHANGE UP (ref 3.8–10.5)
WBC # FLD AUTO: 6.7 K/UL — SIGNIFICANT CHANGE UP (ref 3.8–10.5)

## 2018-02-02 RX ORDER — HALOPERIDOL DECANOATE 100 MG/ML
2 INJECTION INTRAMUSCULAR ONCE
Qty: 0 | Refills: 0 | Status: COMPLETED | OUTPATIENT
Start: 2018-02-02 | End: 2018-02-02

## 2018-02-02 RX ORDER — NICOTINE POLACRILEX 2 MG
1 GUM BUCCAL DAILY
Qty: 0 | Refills: 0 | Status: DISCONTINUED | OUTPATIENT
Start: 2018-02-02 | End: 2018-02-09

## 2018-02-02 RX ADMIN — HALOPERIDOL DECANOATE 2 MILLIGRAM(S): 100 INJECTION INTRAMUSCULAR at 23:24

## 2018-02-02 RX ADMIN — Medication 30 MILLIGRAM(S): at 12:05

## 2018-02-02 RX ADMIN — CEFTRIAXONE 1000 MILLIGRAM(S): 500 INJECTION, POWDER, FOR SOLUTION INTRAMUSCULAR; INTRAVENOUS at 05:29

## 2018-02-02 RX ADMIN — Medication 100 MILLIGRAM(S): at 17:55

## 2018-02-02 RX ADMIN — Medication 81 MILLIGRAM(S): at 12:05

## 2018-02-02 RX ADMIN — Medication 40 MILLIGRAM(S): at 05:29

## 2018-02-02 RX ADMIN — SODIUM CHLORIDE 100 MILLILITER(S): 9 INJECTION, SOLUTION INTRAVENOUS at 22:21

## 2018-02-02 RX ADMIN — ATORVASTATIN CALCIUM 40 MILLIGRAM(S): 80 TABLET, FILM COATED ORAL at 22:18

## 2018-02-02 RX ADMIN — Medication 40 MILLIGRAM(S): at 17:55

## 2018-02-02 RX ADMIN — Medication 100 MILLIGRAM(S): at 05:29

## 2018-02-02 RX ADMIN — Medication 1 PATCH: at 19:10

## 2018-02-02 NOTE — PROGRESS NOTE ADULT - SUBJECTIVE AND OBJECTIVE BOX
Patient is a 82y old  Male who presents with a chief complaint of weakness, low blood pressure and hypoxia (29 Jan 2018 19:04)    HPI:  83 y/o gentleman with h/o HTN, BPH, anxiety, COPD, neuropathy, skin Ca lesions s/p removal was admitted on 1/29 from Dr. Miller's office for increased weakness, hypoxia. He had weakness/loss of balance and tremors with decreased oral intake x 4 days PTA.  The office reports pt was found to be  hypoxic to 67% on RA, with HR 31 and BP 80/60, Temp 99F.   EMS reported normal vital signs, but then en route to the hospital pt's blood pressure dropped to 80/40, his O2 sat was fluctuating, and pt was cyanotic. Pt has not been eating much for the last two days, due to poor appetite.  No abd complaints. He reports intermittent burning with urination.  His wife reports he is barely able to walk and has been stumbling. In ER, he received ceftriaxone and doxycycline.     Sitting in chair in NAD  Weak looking  Mild SOB  Confused; agitated at night  Has dry cough    MEDICATIONS  (STANDING):  ALBUTerol/ipratropium for Nebulization 3 milliLiter(s) Nebulizer every 4 hours  aspirin  chewable 81 milliGRAM(s) Oral daily  atorvastatin 40 milliGRAM(s) Oral at bedtime  buDESOnide 160 MICROgram(s)/formoterol 4.5 MICROgram(s) Inhaler 2 Puff(s) Inhalation two times a day  cefTRIAXone Injectable.      cefTRIAXone Injectable. 1000 milliGRAM(s) IV Push every 24 hours  doxycycline hyclate Capsule 100 milliGRAM(s) Oral every 12 hours  methylPREDNISolone sodium succinate Injectable 40 milliGRAM(s) IV Push two times a day  nicotine -  14 mG/24Hr(s) Patch 1 patch Transdermal daily  oseltamivir 30 milliGRAM(s) Oral daily  sodium chloride 0.45%. 1000 milliLiter(s) (100 mL/Hr) IV Continuous <Continuous>    MEDICATIONS  (PRN):  acetaminophen   Tablet. 650 milliGRAM(s) Oral every 6 hours PRN Mild Pain (1 - 3)  calcium carbonate 500 mG (Tums) Chewable 2 Tablet(s) Chew every 4 hours PRN Heartburn      Vital Signs Last 24 Hrs  T(C): 36.4 (02 Feb 2018 10:15), Max: 36.6 (01 Feb 2018 20:12)  T(F): 97.5 (02 Feb 2018 10:15), Max: 97.8 (01 Feb 2018 20:12)  HR: 57 (02 Feb 2018 10:15) (57 - 73)  BP: 161/83 (02 Feb 2018 10:15) (146/52 - 161/83)  BP(mean): --  RR: 18 (02 Feb 2018 10:15) (18 - 18)  SpO2: 98% (02 Feb 2018 10:15) (93% - 98%)    Physical Exam:      Constitutional: frail looking  HEENT: NC/AT, EOMI, PERRLA  Neck: supple  Back: no tenderness  Respiratory: crackles at bases; few wheezes  Cardiovascular: S1S2 regular, no murmurs  Abdomen: soft, not tender, not distended, positive BS  Genitourinary: no LN palpable  Rectal: deferred  Musculoskeletal: no muscle tenderness, no joint swelling or tenderness  Extremities: no pedal edema  Neurological: AxOx3, moving all extremities  Skin: no rashes    Labs:                        9.8    7.2   )-----------( 89       ( 01 Feb 2018 05:35 )             30.0     02-01    144  |  113<H>  |  63<H>  ----------------------------<  174<H>  4.4   |  24  |  2.94<H>    Ca    8.5      01 Feb 2018 12:04                        9.9    6.9   )-----------( 73       ( 31 Jan 2018 07:33 )             29.8     01-31    142  |  108  |  60<H>  ----------------------------<  142<H>  4.3   |  23  |  3.19<H>    Ca    8.1<L>      31 Jan 2018 07:33    TPro  6.5  /  Alb  x   /  TBili  x   /  DBili  x   /  AST  x   /  ALT  x   /  AlkPhos  x   01-29     LIVER FUNCTIONS - ( 29 Jan 2018 21:54 )  Alb: x     / Pro: 6.5 g/dL / ALK PHOS: x     / ALT: x     / AST: x     / GGT: x           Rapid Respiratory Viral Panel (01.29.18 @ 20:41)    Rapid RVP Result: Detected: The [employer] RVP Rapid uses polymerase chain reaction (PCR) and melt  curve analysis to screen for adenovirus; coronavirus HKU1, NL63, 229E,  OC43; human metapneumovirus (hMPV); human enterovirus/rhinovirus  (Entero/RV); influenza A; influenza A/H1;influenza A/H3; influenza  A/H1-2009; influenza B; parainfluenza viruses 1, 2, 3, 4; respiratory  syncytial virus; Bordetella pertussis; Mycoplasma pneumoniae; and  Chlamydophila pneumoniae.    Influenza AH3 (RapRVP): Detected: Test Name:flu a h3  Called by:_[email or web address]  Called to:_    MHyisel RN  Read back 2 Pt IDs:_y Read back values:_y Date/Tm:_01/30/18 01:58      Culture - Blood (collected 29 Jan 2018 17:22)  Source: .Blood None  Preliminary Report (31 Jan 2018 01:02):    No growth to date.    Culture - Blood (collected 29 Jan 2018 17:22)  Source: .Blood None  Preliminary Report (31 Jan 2018 01:02):    No growth to date.    Culture - Urine (collected 29 Jan 2018 01:07)  Source: .Urine Clean Catch (Midstream)  Final Report (31 Jan 2018 11:45):    No growth          Radiology:    < from: CT Chest No Cont (01.29.18 @ 23:21) >  Scattered tree-in-bud opacities along the lungs may represent small   airways disease/infection. No dense consolidation is seen    No evidence of bowel obstruction.    Foci of high density along the urinary bladder posteriorly may represent   multiple layering bladder calculi versus wall calcifications. Correlate   with urologic examination    < end of copied text >      Advanced directives addressed: full resuscitation

## 2018-02-02 NOTE — PROGRESS NOTE ADULT - SUBJECTIVE AND OBJECTIVE BOX
Patient is a 82y old  Male who presents with a chief complaint of weakness, low blood pressure and hypoxia (29 Jan 2018 19:04)      HPI:  Pt is an 81 y/o gentleman with a PMHx of HTN, BPH, anxiety and current smoking who was sent to the ER from Dr. Miller's office.  He was having weakness/loss of balance and tremors for 4 days with decreased oral intake.  The office reports pt was suddenly found to be  hypoxic to 67% on RA, with HR 31 and BP 80/60, Temp 99F.   EMS reported  pt had normal vital signs, but then en route pt's blood pressure dropped to 80/40, his O2 sat was fluctuating, and pt was cyanotic with +cough at baseline.    Pt has not been eating much for the last two days, due to poor appetite.  No abd complaints. He reports intermittent burning with urination.  His wife reports he is barely able to walk and has been stumbling.    Pt denies palpitations , chest pain, SOB, fever, chills, n/v, edema or calf pain.  He has a chronic wet cough and runny nose which family and pt reports is unchanged.       Pt received a PNA shot 2 weeks ago.  He does not like to see the doctor.    2/1 pt is seen and examined followed at [facility] by NP, Renetta Razo  she has not been eval by pulm and does not to answer too many questions  pos smoking hx  feels better since admit  no cp  2/2  OOB in chair  more confused  not labored with breathing  no distress     PMed/Surg Hx   HTN,    BPH,   Tobacco Dependance 60 pack years  Bronchitis/Likely COPD  anxiety   Neuropathy  Skin cancer lesions removed from face and back    Fam Hx:  Non-contrib to current adm (29 Jan 2018 19:04)      PAST MEDICAL & SURGICAL HISTORY:  Anxiety  HTN (hypertension)      PREVIOUS DIAGNOSTIC TESTING:      MEDICATIONS  (STANDING):  ALBUTerol/ipratropium for Nebulization 3 milliLiter(s) Nebulizer every 4 hours  aspirin  chewable 81 milliGRAM(s) Oral daily  atorvastatin 40 milliGRAM(s) Oral at bedtime  buDESOnide 160 MICROgram(s)/formoterol 4.5 MICROgram(s) Inhaler 2 Puff(s) Inhalation two times a day  cefTRIAXone Injectable.      cefTRIAXone Injectable. 1000 milliGRAM(s) IV Push every 24 hours  doxycycline hyclate Capsule 100 milliGRAM(s) Oral every 12 hours  methylPREDNISolone sodium succinate Injectable 40 milliGRAM(s) IV Push two times a day  oseltamivir 30 milliGRAM(s) Oral daily  sodium chloride 0.45%. 1000 milliLiter(s) (100 mL/Hr) IV Continuous <Continuous>      MEDICATIONS  (PRN):  acetaminophen   Tablet. 650 milliGRAM(s) Oral every 6 hours PRN Mild Pain (1 - 3)  calcium carbonate 500 mG (Tums) Chewable 2 Tablet(s) Chew every 4 hours PRN Heartburn      FAMILY HISTORY:      SOCIAL HISTORY:  pos smoking hx    REVIEW OF SYSTEM:  Pertinent items are noted in HPI.  Constitutional negative for chills, fevers, sweats and weight loss  throat, and face:  negative for epistaxis, pos nasal congestion, sore throat and   tinnitus  Respiratory: pos  for cough, dyspnea on exertion, no pleuritic chest pain  and wheezing  Cardiovascular:  negative for chest pain, dyspnea and palpitations  Gastrointestinal: negative for abdominal pain, diarrhea, nausea and vomiting  Genitourinary: negative for dysuria, frequency and urinary incontinence  Skin:  negative for redness, rash, pruritus, swelling, dryness and   fissures  Hematologic/lymphatic: negative for bleeding and easy bruising  Musculoskeletal: negative for arthralgias, back pain and muscle weakness  Neurological: negative for dizziness, headaches, seizures and tremors  Behavioral/Psych:  negative for mood change, depression, suicidal attempts    Allergic/Immunologic: negative for anaphylaxis, angioedema and urticaria    Vital Signs Last 24 Hrs  T(C): 36.6 (01 Feb 2018 20:12), Max: 36.6 (01 Feb 2018 10:00)  T(F): 97.8 (01 Feb 2018 20:12), Max: 97.9 (01 Feb 2018 10:00)  HR: 67 (01 Feb 2018 20:12) (64 - 73)  BP: 153/64 (01 Feb 2018 20:12) (132/54 - 153/64)  BP(mean): --  RR: 18 (01 Feb 2018 20:12) (18 - 18)  SpO2: 93% (01 Feb 2018 20:12) (93% - 97%)  I&O's Summary    PHYSICAL EXAM  General Appearance: cooperative, no acute distress,   HEENT: PERRL, conjunctiva clear, EOM's intact, non injected pharynx, no exudate, TM   normal  Neck: Supple, , no adenopathy, thyroid: not enlarged, no carotid bruit or JVD  Back: Symmetric, no  tenderness,no soft tissue tenderness  Lungs: diffuse exp wheeze, Bilateral Rhonchi, no dullness  Heart: Regular rate and rhythm, S1, S2 normal, no murmur, rub or gallop  Abdomen: Soft, non-tender, bowel sounds active , no hepatosplenomegaly  Extremities: no cyanosis or edema, no joint swelling  Skin: Skin color, texture normal, no rashes   Neurologic: Alert and oriented X3 , cranial nerves intact, sensory and motor normal,    ECG:    LABS:                          9.8    7.2   )-----------( 89       ( 01 Feb 2018 05:35 )             30.0     01-31    142  |  108  |  60<H>  ----------------------------<  142<H>  4.3   |  23  |  3.19<H>    Ca    8.1<L>      31 Jan 2018 07:33      CARDIAC MARKERS ( 30 Jan 2018 22:46 )  0.022 ng/mL / x     / x     / x     / x      CARDIAC MARKERS ( 30 Jan 2018 15:53 )  0.020 ng/mL / x     / 2084 U/L / x     / x          Lipid Panel  165  27  109  146    Pro BNP  1903 01-29 @ 17:22  D Dimer  -- 01-29 @ 17:22          < from: CT Chest No Cont (01.29.18 @ 23:21) >  IMPRESSION:    Scattered tree-in-bud opacities along the lungs may represent small   airways disease/infection. No dense consolidation is seen    No evidence of bowel obstruction.    Foci of high density along the urinary bladder posteriorly may represent   multiple layering bladder calculi versus wall calcifications. Correlate   with urologic examination    < end of copied text >      RADIOLOGY & ADDITIONAL STUDIES:  < from: Xray Chest 1 View- PORTABLE-Urgent (01.31.18 @ 09:31) >  PROCEDURE DATE:  01/31/2018          INTERPRETATION:  Views:1  Comparison: 01/29/18  History: Hypoxia    The lungs are clear of infiltrates and effusions.     The cardiac and   mediastinal contours appear unremarkable.     Impression:  1. No evidence of acute pulmonary disease.        < end of copied text >  Influenza AH3 (RapRVP): Detected: Test Name:flu a h3  Called by:_valerio  Called to:_    MHyisel RN  Read back 2 Pt IDs:_y Read back values:_y Date/Tm:_01/30/18 01:58 (01.29.18 @ 20:41)    < from: NM Pulmonary Ventilation/Perfusion Scan (01.31.18 @ 12:32) >  COMPARISON: No previous lung V/Q scan for comparison     FINDINGS: There is heterogeneous radiotracer distribution in the lungs on   both the ventilation and the perfusion images. There is matched V/Q   defect in the medial segment of the right middle lobe with no   corresponding chest x-ray opacity. No segmental mismatched V/Q defect is   identified.      IMPRESSION: Abnormal ventilation/perfusion lung scan. Low probability of   pulmonary embolus.      < end of copied text >

## 2018-02-02 NOTE — PROGRESS NOTE ADULT - SUBJECTIVE AND OBJECTIVE BOX
Chief Complaint/Reason for Admission: weakness, low blood pressure and hypoxia	    History of Present Illness: 	  Pt is an 83 y/o gentleman with a PMHx of HTN, BPH, anxiety and current smoking who was sent to the ER from Dr. Miller's office.  He was having weakness/loss of balance and tremors for 4 days with decreased oral intake.  The office reports pt was suddenly found to be  hypoxic to 67% on RA, with HR 31 and BP 80/60, Temp 99F.   EMS reported  pt had normal vital signs, but then en route pt's blood pressure dropped to 80/40, his O2 sat was fluctuating, and pt was cyanotic with +cough at baseline.    Pt has not been eating much for the last two days, due to poor appetite.  No abd complaints. He reports intermittent burning with urination.  His wife reports he is barely able to walk and has been stumbling.    Pt denies palpitations , chest pain, SOB, fever, chills, n/v, edema or calf pain.  He has a chronic wet cough and runny nose which family and pt reports is unchanged.       Pt received a PNA shot 2 weeks ago.  He does not like to see the doctor.    1/31: Patient is comfortably lying down, Venti - mask on sating high 80s. Denies any HA, CP, SOB. No overnight fevers, chills or shakes. vitals reviewed. pending V/Q scan today given patient being hypoxic.     2/2: seen and eval. comfortable. no new complaints. hemodynamically stable. Denies any HA, CP, SOB. Episodes of sundowning.     REVIEW OF SYSTEMS:  as per HPI    Physical Exam:   GENERAL APPEARANCE:  elderly, deconditioned, frail  ICU Vital Signs Last 24 Hrs  T(C): 36.4 (02 Feb 2018 10:15), Max: 36.6 (01 Feb 2018 20:12)  T(F): 97.5 (02 Feb 2018 10:15), Max: 97.8 (01 Feb 2018 20:12)  HR: 57 (02 Feb 2018 10:15) (57 - 73)  RR: 18 (02 Feb 2018 10:15) (18 - 18)  SpO2: 98% (02 Feb 2018 10:15) (93% - 98%)  HEENT:  Head is normocephalic    Skin:  dry  NECK:  no JVD  HEART:  Regular rate and rhythm. normal S1 and S2, No M/R/G  LUNGS:  decreased lung sounds /  ronchi  ABDOMEN:  Soft, nontender, nondistended with good bowel sounds heard  EXTREMITIES:  no swelling  NEUROLOGICAL:  limited neuro exam    Labs:       CBC Full  -  ( 01 Feb 2018 05:35 )  WBC Count : 7.2 K/uL  Hemoglobin : 9.8 g/dL  Hematocrit : 30.0 %  Platelet Count - Automated : 89 K/uL    144  |  113<H>  |  63<H>  ----------------------------<  174<H>  4.4   |  24  |  2.94<H>    Ca    8.5      01 Feb 2018 12:04        CT of the chest:   Scattered tree-in-bud opacities along the lungs may represent small   airways disease/infection. No dense consolidation is seen    No evidence of bowel obstruction.    Foci of high density along the urinary bladder posteriorly may represent   multiple layering bladder calculi versus wall calcifications. Correlate   with urologic examination    # Sepsis secondary to viral illness / CAP  - started on Tamiflu 30 mg po qdaily in a setting of renal failure  - ceftriaxone 1 gm IV qd / doxycycline 100 mg IV q 12h / oseltamivir 30 mg PO qd # 3  - Dupplex LE neg for DVT  - stop heparin drip.  given patient remained hypoxic overnight, we will do a V/Q scan today  - hold metoprolol and flomax for now  - care discussed with Dr. Santacruz, concern was raised in regards of the thrombocytopenia and patient being on heparin drip. If persistant hypoxia will do VQ scan in the am.     # acute on chronic hypoxic respiratory failure secondary to acute COPD excerebration / CAP / Acute Influenza causing BRONCHIOLITIS / Mucus plugging cont factor to hypoxemia  - Solumedrol 125mg given in the ER, will cont 40mg BID  - IV Rocephin / doxy start date 1/31  - cont nebs  - smoking cessation    # Elevated troponin secondary to demand ischemia secondary to flue  - normalized  - started on ASA/STATIN    # ARF (tremulousness likely due to Uremia) / r/o MM / obstruction  - cont fluids  - hold losartan  - US and CT bladder  - SPEP and UPEP, Bence Salgado proteins    # Thrombocytopenia, anemia, elevated ESR  - will closely trend labs  - plt 70s - 89s, continue trending    # mild cognitive impairment /  sundowning  - will work up with ESR / liver function test /  B12 / syphilis screen and ESR  - will need neuro follow up post discharge    # DVT prophylaxis  - venodynes

## 2018-02-03 LAB
ANION GAP SERPL CALC-SCNC: 10 MMOL/L — SIGNIFICANT CHANGE UP (ref 5–17)
BUN SERPL-MCNC: 64 MG/DL — HIGH (ref 7–23)
CALCIUM SERPL-MCNC: 9 MG/DL — SIGNIFICANT CHANGE UP (ref 8.5–10.1)
CHLORIDE SERPL-SCNC: 114 MMOL/L — HIGH (ref 96–108)
CO2 SERPL-SCNC: 22 MMOL/L — SIGNIFICANT CHANGE UP (ref 22–31)
CREAT SERPL-MCNC: 2.35 MG/DL — HIGH (ref 0.5–1.3)
GLUCOSE SERPL-MCNC: 111 MG/DL — HIGH (ref 70–99)
HCT VFR BLD CALC: 32.7 % — LOW (ref 39–50)
HGB BLD-MCNC: 10.7 G/DL — LOW (ref 13–17)
MCHC RBC-ENTMCNC: 29.8 PG — SIGNIFICANT CHANGE UP (ref 27–34)
MCHC RBC-ENTMCNC: 32.8 GM/DL — SIGNIFICANT CHANGE UP (ref 32–36)
MCV RBC AUTO: 90.9 FL — SIGNIFICANT CHANGE UP (ref 80–100)
PLATELET # BLD AUTO: 119 K/UL — LOW (ref 150–400)
POTASSIUM SERPL-MCNC: 4.4 MMOL/L — SIGNIFICANT CHANGE UP (ref 3.5–5.3)
POTASSIUM SERPL-SCNC: 4.4 MMOL/L — SIGNIFICANT CHANGE UP (ref 3.5–5.3)
RBC # BLD: 3.6 M/UL — LOW (ref 4.2–5.8)
RBC # FLD: 13.2 % — SIGNIFICANT CHANGE UP (ref 10.3–14.5)
SODIUM SERPL-SCNC: 146 MMOL/L — HIGH (ref 135–145)
T PALLIDUM AB TITR SER: NEGATIVE — SIGNIFICANT CHANGE UP
TSH SERPL-MCNC: 0.17 UU/ML — LOW (ref 0.36–3.74)
VIT B12 SERPL-MCNC: 854 PG/ML — SIGNIFICANT CHANGE UP (ref 232–1245)
WBC # BLD: 9.1 K/UL — SIGNIFICANT CHANGE UP (ref 3.8–10.5)
WBC # FLD AUTO: 9.1 K/UL — SIGNIFICANT CHANGE UP (ref 3.8–10.5)

## 2018-02-03 RX ORDER — HALOPERIDOL DECANOATE 100 MG/ML
2 INJECTION INTRAMUSCULAR EVERY 4 HOURS
Qty: 0 | Refills: 0 | Status: DISCONTINUED | OUTPATIENT
Start: 2018-02-03 | End: 2018-02-09

## 2018-02-03 RX ORDER — HALOPERIDOL DECANOATE 100 MG/ML
2 INJECTION INTRAMUSCULAR EVERY 6 HOURS
Qty: 0 | Refills: 0 | Status: DISCONTINUED | OUTPATIENT
Start: 2018-02-03 | End: 2018-02-03

## 2018-02-03 RX ORDER — QUETIAPINE FUMARATE 200 MG/1
25 TABLET, FILM COATED ORAL AT BEDTIME
Qty: 0 | Refills: 0 | Status: DISCONTINUED | OUTPATIENT
Start: 2018-02-03 | End: 2018-02-09

## 2018-02-03 RX ORDER — HEPARIN SODIUM 5000 [USP'U]/ML
5000 INJECTION INTRAVENOUS; SUBCUTANEOUS EVERY 12 HOURS
Qty: 0 | Refills: 0 | Status: DISCONTINUED | OUTPATIENT
Start: 2018-02-03 | End: 2018-02-09

## 2018-02-03 RX ORDER — TAMSULOSIN HYDROCHLORIDE 0.4 MG/1
0.4 CAPSULE ORAL AT BEDTIME
Qty: 0 | Refills: 0 | Status: DISCONTINUED | OUTPATIENT
Start: 2018-02-03 | End: 2018-02-09

## 2018-02-03 RX ADMIN — ATORVASTATIN CALCIUM 40 MILLIGRAM(S): 80 TABLET, FILM COATED ORAL at 22:08

## 2018-02-03 RX ADMIN — HALOPERIDOL DECANOATE 2 MILLIGRAM(S): 100 INJECTION INTRAMUSCULAR at 19:54

## 2018-02-03 RX ADMIN — Medication 3 MILLILITER(S): at 17:01

## 2018-02-03 RX ADMIN — Medication 1 PATCH: at 19:53

## 2018-02-03 RX ADMIN — CEFTRIAXONE 1000 MILLIGRAM(S): 500 INJECTION, POWDER, FOR SOLUTION INTRAMUSCULAR; INTRAVENOUS at 06:37

## 2018-02-03 RX ADMIN — BUDESONIDE AND FORMOTEROL FUMARATE DIHYDRATE 2 PUFF(S): 160; 4.5 AEROSOL RESPIRATORY (INHALATION) at 08:13

## 2018-02-03 RX ADMIN — QUETIAPINE FUMARATE 25 MILLIGRAM(S): 200 TABLET, FILM COATED ORAL at 22:08

## 2018-02-03 RX ADMIN — TAMSULOSIN HYDROCHLORIDE 0.4 MILLIGRAM(S): 0.4 CAPSULE ORAL at 22:09

## 2018-02-03 RX ADMIN — Medication 3 MILLILITER(S): at 08:11

## 2018-02-03 RX ADMIN — HALOPERIDOL DECANOATE 2 MILLIGRAM(S): 100 INJECTION INTRAMUSCULAR at 10:13

## 2018-02-03 RX ADMIN — HEPARIN SODIUM 5000 UNIT(S): 5000 INJECTION INTRAVENOUS; SUBCUTANEOUS at 22:08

## 2018-02-03 RX ADMIN — Medication 40 MILLIGRAM(S): at 06:40

## 2018-02-03 NOTE — PROGRESS NOTE ADULT - SUBJECTIVE AND OBJECTIVE BOX
Chief Complaint/Reason for Admission: weakness, low blood pressure and hypoxia	   History of Present Illness: 	  Pt is an 81 y/o gentleman with a PMHx of HTN, BPH, anxiety and current smoking who was sent to the ER from Dr. Miller's office.  He was having weakness/loss of balance and tremors for 4 days with decreased oral intake.  The office reports pt was suddenly found to be  hypoxic to 67% on RA, with HR 31 and BP 80/60, Temp 99F.   EMS reported  pt had normal vital signs, but then en route pt's blood pressure dropped to 80/40, his O2 sat was fluctuating, and pt was cyanotic with +cough at baseline.    Pt has not been eating much for the last two days, due to poor appetite.   Pt received a PNA shot 2 weeks ago.  He does not like to see the doctor.    Hospital Course: Low prob VQ scan for PE, instead for to have Influenza and underwent treatment for superimposed CAP w/ abx. Course complicated by acute delirium requiring 1:1 sitter and IM Haldol.     Sub: Seen w/ daughters at bedside, worried to see patient so agitated and confused. Denies pain, attempting to get out of chair, hypoxic this AM on RA to low 70's.    REVIEW OF SYSTEMS:  as per HPI    Vital Signs Last 24 Hrs  T(C): 36.5 (03 Feb 2018 10:00), Max: 36.6 (02 Feb 2018 16:29)  T(F): 97.7 (03 Feb 2018 10:00), Max: 97.8 (02 Feb 2018 16:29)  HR: 78 (03 Feb 2018 10:00) (78 - 97)  BP: 158/83 (03 Feb 2018 10:00) (150/58 - 158/83)  BP(mean): --  RR: 18 (03 Feb 2018 10:00) (18 - 18)  SpO2: 98% (03 Feb 2018 10:00) (85% - 98%)    Physical Exam:   GENERAL APPEARANCE:  elderly, deconditioned, frail appearing and agitated.   HEENT:  Head is normocephalic    Skin:  dry  NECK:  no JVD  HEART:  Regular rate and rhythm. normal S1 and S2, No M/R/G  LUNGS:  decreased lung sounds /  ronchi, no wheezing  ABDOMEN:  Soft, nontender, nondistended with good bowel sounds heard  EXTREMITIES:  no swelling  NEUROLOGICAL:  limited neuro exam    Labs:                         10.7   9.1   )-----------( 119      ( 03 Feb 2018 05:54 )             32.7   02-03    146<H>  |  114<H>  |  64<H>  ----------------------------<  111<H>  4.4   |  22  |  2.35<H>    Ca    9.0      03 Feb 2018 05:54    TPro  6.9  /  Alb  2.9<L>  /  TBili  0.5  /  DBili  x   /  AST  63<H>  /  ALT  47  /  AlkPhos  81  02-02      CT of the chest:   Scattered tree-in-bud opacities along the lungs may represent small   airways disease/infection. No dense consolidation is seen    No evidence of bowel obstruction.    Foci of high density along the urinary bladder posteriorly may represent   multiple layering bladder calculi versus wall calcifications. Correlate   with urologic examination    # Sepsis secondary to viral illness / CAP  - started on Tamiflu 30 mg po qdaily in a setting of renal failure   - ceftriaxone 1 gm IV qd / doxycycline 100 mg IV q 12h / oseltamivir 30 mg PO qd # 4  - Dupplex LE neg for DVT / low Prob V/Q scan. Off Hep drip.   - hold metoprolol and resume Flomax    # acute on chronic hypoxic respiratory failure secondary to acute COPD excerebration / CAP / Acute Influenza causing BRONCHIOLITIS / Mucus plugging cont factor to hypoxemia  - s/p 4 days IV Solumedrol --> start PO taper prednisone on 2/4  - IV Rocephin / doxy start date 1/31  - cont nebs  - smoking cessation    # mild cognitive impairment /  sundowning  # Delirium   - worsened in the setting of steroids. Add Haldol and Seroquel QHS.   - discussed w/ daughters for outpatient dementia workup with neuro. Inpatient work up negative: neg syphillis screen / B12 normal.     # Elevated troponin secondary to demand ischemia secondary to flue  - normalized  - started on ASA/STATIN    # ARF (tremulousness likely due to Uremia) / r/o MM / obstruction  - cont fluids  - hold losartan  - US and CT bladder  - SPEP and UPEP, Bence Salgado proteins    # Thrombocytopenia, anemia, elevated ESR - likely 2/2 sepsis as platelet count rising.     # DVT prophylaxis-  venodynes  - start subQ Heparin.     Dispo: remain inpatient on telemetry. Case discussed in depth w/ family and staff.   Total time > 45 mins.

## 2018-02-03 NOTE — PROGRESS NOTE ADULT - SUBJECTIVE AND OBJECTIVE BOX
Patient is a 82y Male on 1:1. Per daughter was agitated ON.    REVIEW OF SYSTEMS:    UTO confusion    MEDICATIONS  (STANDING):  ALBUTerol/ipratropium for Nebulization 3 milliLiter(s) Nebulizer every 4 hours  aspirin  chewable 81 milliGRAM(s) Oral daily  atorvastatin 40 milliGRAM(s) Oral at bedtime  buDESOnide 160 MICROgram(s)/formoterol 4.5 MICROgram(s) Inhaler 2 Puff(s) Inhalation two times a day  cefTRIAXone Injectable.      cefTRIAXone Injectable. 1000 milliGRAM(s) IV Push every 24 hours  doxycycline hyclate Capsule 100 milliGRAM(s) Oral every 12 hours  methylPREDNISolone sodium succinate Injectable 40 milliGRAM(s) IV Push two times a day  nicotine -  14 mG/24Hr(s) Patch 1 patch Transdermal daily  oseltamivir 30 milliGRAM(s) Oral daily  sodium chloride 0.45%. 1000 milliLiter(s) (100 mL/Hr) IV Continuous <Continuous>    MEDICATIONS  (PRN):  acetaminophen   Tablet. 650 milliGRAM(s) Oral every 6 hours PRN Mild Pain (1 - 3)  calcium carbonate 500 mG (Tums) Chewable 2 Tablet(s) Chew every 4 hours PRN Heartburn        T(C): , Max: 36.6 (02-02-18 @ 16:29)  T(F): , Max: 97.8 (02-02-18 @ 16:29)  HR: 84 (02-03-18 @ 08:12)  BP: 158/78 (02-03-18 @ 05:42)  BP(mean): --  RR: 18 (02-03-18 @ 05:42)  SpO2: 85% (02-03-18 @ 08:12)  Wt(kg): --    02-02 @ 07:01  -  02-03 @ 07:00  --------------------------------------------------------  IN: 1100 mL / OUT: 425 mL / NET: 675 mL          PHYSICAL EXAM:    Constitutional: frail  HEENT: PERRLA, EOMI,  MMM  Neck: No LAD, No JVD  Respiratory: marian jacobs  Cardiovascular: S1 and S2, RRR  Gastrointestinal: BS+, soft, NT/ND  Extremities: No peripheral edema  Neurological: Asleep  : No Schreiber  Skin: No rashes  Access: Not applicable        LABS:                        10.7   9.1   )-----------( 119      ( 03 Feb 2018 05:54 )             32.7     03 Feb 2018 05:54    146    |  114    |  64     ----------------------------<  111    4.4     |  22     |  2.35   02 Feb 2018 13:06    144    |  113    |  60     ----------------------------<  128    4.3     |  25     |  2.40   01 Feb 2018 12:04    144    |  113    |  63     ----------------------------<  174    4.4     |  24     |  2.94   31 Jan 2018 07:33    142    |  108    |  60     ----------------------------<  142    4.3     |  23     |  3.19     Ca    9.0        03 Feb 2018 05:54  Ca    9.1        02 Feb 2018 13:06  Ca    8.5        01 Feb 2018 12:04  Ca    8.1        31 Jan 2018 07:33    TPro  6.9    /  Alb  2.9    /  TBili  0.5    /  DBili  x      /  AST  63     /  ALT  47     /  AlkPhos  81     02 Feb 2018 13:06          Urine Studies:          RADIOLOGY & ADDITIONAL STUDIES:

## 2018-02-04 LAB
ANION GAP SERPL CALC-SCNC: 9 MMOL/L — SIGNIFICANT CHANGE UP (ref 5–17)
BUN SERPL-MCNC: 58 MG/DL — HIGH (ref 7–23)
CALCIUM SERPL-MCNC: 8.5 MG/DL — SIGNIFICANT CHANGE UP (ref 8.5–10.1)
CHLORIDE SERPL-SCNC: 117 MMOL/L — HIGH (ref 96–108)
CO2 SERPL-SCNC: 21 MMOL/L — LOW (ref 22–31)
CREAT SERPL-MCNC: 1.97 MG/DL — HIGH (ref 0.5–1.3)
CULTURE RESULTS: SIGNIFICANT CHANGE UP
CULTURE RESULTS: SIGNIFICANT CHANGE UP
GLUCOSE SERPL-MCNC: 92 MG/DL — SIGNIFICANT CHANGE UP (ref 70–99)
HCT VFR BLD CALC: 31.8 % — LOW (ref 39–50)
HGB BLD-MCNC: 10.5 G/DL — LOW (ref 13–17)
MCHC RBC-ENTMCNC: 29.8 PG — SIGNIFICANT CHANGE UP (ref 27–34)
MCHC RBC-ENTMCNC: 33.1 GM/DL — SIGNIFICANT CHANGE UP (ref 32–36)
MCV RBC AUTO: 90.1 FL — SIGNIFICANT CHANGE UP (ref 80–100)
PLATELET # BLD AUTO: 118 K/UL — LOW (ref 150–400)
POTASSIUM SERPL-MCNC: 3.7 MMOL/L — SIGNIFICANT CHANGE UP (ref 3.5–5.3)
POTASSIUM SERPL-SCNC: 3.7 MMOL/L — SIGNIFICANT CHANGE UP (ref 3.5–5.3)
RBC # BLD: 3.52 M/UL — LOW (ref 4.2–5.8)
RBC # FLD: 12.8 % — SIGNIFICANT CHANGE UP (ref 10.3–14.5)
SODIUM SERPL-SCNC: 147 MMOL/L — HIGH (ref 135–145)
SPECIMEN SOURCE: SIGNIFICANT CHANGE UP
SPECIMEN SOURCE: SIGNIFICANT CHANGE UP
T4 FREE SERPL-MCNC: 1.3 NG/DL — SIGNIFICANT CHANGE UP (ref 0.76–1.46)
WBC # BLD: 7.1 K/UL — SIGNIFICANT CHANGE UP (ref 3.8–10.5)
WBC # FLD AUTO: 7.1 K/UL — SIGNIFICANT CHANGE UP (ref 3.8–10.5)

## 2018-02-04 PROCEDURE — 93010 ELECTROCARDIOGRAM REPORT: CPT

## 2018-02-04 RX ORDER — SODIUM CHLORIDE 9 MG/ML
1000 INJECTION, SOLUTION INTRAVENOUS
Qty: 0 | Refills: 0 | Status: COMPLETED | OUTPATIENT
Start: 2018-02-04 | End: 2018-02-05

## 2018-02-04 RX ORDER — QUETIAPINE FUMARATE 200 MG/1
25 TABLET, FILM COATED ORAL DAILY
Qty: 0 | Refills: 0 | Status: DISCONTINUED | OUTPATIENT
Start: 2018-02-04 | End: 2018-02-09

## 2018-02-04 RX ADMIN — Medication 3 MILLILITER(S): at 16:13

## 2018-02-04 RX ADMIN — HALOPERIDOL DECANOATE 2 MILLIGRAM(S): 100 INJECTION INTRAMUSCULAR at 00:24

## 2018-02-04 RX ADMIN — HALOPERIDOL DECANOATE 2 MILLIGRAM(S): 100 INJECTION INTRAMUSCULAR at 10:16

## 2018-02-04 RX ADMIN — HALOPERIDOL DECANOATE 2 MILLIGRAM(S): 100 INJECTION INTRAMUSCULAR at 05:10

## 2018-02-04 RX ADMIN — SODIUM CHLORIDE 80 MILLILITER(S): 9 INJECTION, SOLUTION INTRAVENOUS at 13:06

## 2018-02-04 RX ADMIN — HEPARIN SODIUM 5000 UNIT(S): 5000 INJECTION INTRAVENOUS; SUBCUTANEOUS at 21:12

## 2018-02-04 RX ADMIN — HEPARIN SODIUM 5000 UNIT(S): 5000 INJECTION INTRAVENOUS; SUBCUTANEOUS at 09:00

## 2018-02-04 RX ADMIN — BUDESONIDE AND FORMOTEROL FUMARATE DIHYDRATE 2 PUFF(S): 160; 4.5 AEROSOL RESPIRATORY (INHALATION) at 16:12

## 2018-02-04 RX ADMIN — Medication 3 MILLILITER(S): at 16:12

## 2018-02-04 RX ADMIN — Medication 100 MILLIGRAM(S): at 05:12

## 2018-02-04 RX ADMIN — CEFTRIAXONE 1000 MILLIGRAM(S): 500 INJECTION, POWDER, FOR SOLUTION INTRAMUSCULAR; INTRAVENOUS at 05:20

## 2018-02-04 RX ADMIN — Medication 1 PATCH: at 12:28

## 2018-02-04 RX ADMIN — HALOPERIDOL DECANOATE 2 MILLIGRAM(S): 100 INJECTION INTRAMUSCULAR at 14:56

## 2018-02-04 RX ADMIN — SODIUM CHLORIDE 100 MILLILITER(S): 9 INJECTION, SOLUTION INTRAVENOUS at 05:15

## 2018-02-04 NOTE — PROGRESS NOTE ADULT - SUBJECTIVE AND OBJECTIVE BOX
Patient is a 82y Male , family reprots still confused. Well beyond baseline    REVIEW OF SYSTEMS:    UTO confusion/lethargy.    MEDICATIONS  (STANDING):  ALBUTerol/ipratropium for Nebulization 3 milliLiter(s) Nebulizer every 4 hours  aspirin  chewable 81 milliGRAM(s) Oral daily  atorvastatin 40 milliGRAM(s) Oral at bedtime  buDESOnide 160 MICROgram(s)/formoterol 4.5 MICROgram(s) Inhaler 2 Puff(s) Inhalation two times a day  cefTRIAXone Injectable.      cefTRIAXone Injectable. 1000 milliGRAM(s) IV Push every 24 hours  doxycycline hyclate Capsule 100 milliGRAM(s) Oral every 12 hours  heparin  Injectable 5000 Unit(s) SubCutaneous every 12 hours  nicotine -  14 mG/24Hr(s) Patch 1 patch Transdermal daily  oseltamivir 30 milliGRAM(s) Oral daily  QUEtiapine 25 milliGRAM(s) Oral daily  QUEtiapine 25 milliGRAM(s) Oral at bedtime  sodium chloride 0.45%. 1000 milliLiter(s) (100 mL/Hr) IV Continuous <Continuous>  tamsulosin 0.4 milliGRAM(s) Oral at bedtime    MEDICATIONS  (PRN):  acetaminophen   Tablet. 650 milliGRAM(s) Oral every 6 hours PRN Mild Pain (1 - 3)  calcium carbonate 500 mG (Tums) Chewable 2 Tablet(s) Chew every 4 hours PRN Heartburn  haloperidol    Injectable 2 milliGRAM(s) IntraMuscular every 4 hours PRN Agitation        T(C): , Max: 37 (02-03-18 @ 16:46)  T(F): , Max: 98.6 (02-03-18 @ 16:46)  HR: 88 (02-04-18 @ 09:58)  BP: 126/78 (02-04-18 @ 09:58)  BP(mean): --  RR: 18 (02-04-18 @ 09:58)  SpO2: 97% (02-04-18 @ 09:58)  Wt(kg): --        PHYSICAL EXAM:    Constitutional: frail  HEENT: PERRLA, EOMI,  MMM  Neck: No LAD, No JVD  Respiratory: scatt ronchi  Cardiovascular: S1 and S2, RRR  Gastrointestinal: BS+, soft, NT/ND  Extremities: No peripheral edema  Neurological: lethargic  : No Schreiber  Skin: No rashes  Access: Not applicable        LABS:                        10.5   7.1   )-----------( 118      ( 04 Feb 2018 06:26 )             31.8     04 Feb 2018 06:26    147    |  117    |  58     ----------------------------<  92     3.7     |  21     |  1.97   03 Feb 2018 05:54    146    |  114    |  64     ----------------------------<  111    4.4     |  22     |  2.35   02 Feb 2018 13:06    144    |  113    |  60     ----------------------------<  128    4.3     |  25     |  2.40   01 Feb 2018 12:04    144    |  113    |  63     ----------------------------<  174    4.4     |  24     |  2.94     Ca    8.5        04 Feb 2018 06:26  Ca    9.0        03 Feb 2018 05:54  Ca    9.1        02 Feb 2018 13:06  Ca    8.5        01 Feb 2018 12:04    TPro  6.9    /  Alb  2.9    /  TBili  0.5    /  DBili  x      /  AST  63     /  ALT  47     /  AlkPhos  81     02 Feb 2018 13:06          Urine Studies:          RADIOLOGY & ADDITIONAL STUDIES:

## 2018-02-04 NOTE — PROGRESS NOTE ADULT - SUBJECTIVE AND OBJECTIVE BOX
Chief Complaint/Reason for Admission: weakness, low blood pressure and hypoxia	   History of Present Illness: 	  Pt is an 83 y/o gentleman with a PMHx of HTN, BPH, anxiety and current smoking who was sent to the ER from Dr. Miller's office.  He was having weakness/loss of balance and tremors for 4 days with decreased oral intake.  The office reports pt was suddenly found to be  hypoxic to 67% on RA, with HR 31 and BP 80/60, Temp 99F.   EMS reported  pt had normal vital signs, but then en route pt's blood pressure dropped to 80/40, his O2 sat was fluctuating, and pt was cyanotic with +cough at baseline.    Pt has not been eating much for the last two days, due to poor appetite.   Pt received a PNA shot 2 weeks ago.  He does not like to see the doctor.    Hospital Course: Low prob VQ scan for PE, instead for to have Influenza and underwent treatment for superimposed CAP w/ abx. Course complicated by acute delirium requiring 1:1 sitter and IM Haldol.     Sub: Still agitated and delirium, no UTI (on abx), urinating w/o difficulty. Wife at bedside, afebrile.     REVIEW OF SYSTEMS:  as per HPI    Vital Signs Last 24 Hrs  T(C): 36.8 (04 Feb 2018 09:58), Max: 37 (03 Feb 2018 16:46)  T(F): 98.3 (04 Feb 2018 09:58), Max: 98.6 (03 Feb 2018 16:46)  HR: 88 (04 Feb 2018 09:58) (62 - 88)  BP: 126/78 (04 Feb 2018 09:58) (126/78 - 138/68)  BP(mean): --  RR: 18 (04 Feb 2018 09:58) (18 - 18)  SpO2: 97% (04 Feb 2018 09:58) (96% - 100%)    Physical Exam:   GENERAL APPEARANCE:  elderly, deconditioned, frail appearing and agitated.   HEENT:  Head is normocephalic    Skin:  dry  NECK:  no JVD  HEART:  Regular rate and rhythm. normal S1 and S2, No M/R/G  LUNGS:  decreased lung sounds /  less rhonchi, no wheezing  ABDOMEN:  Soft, nontender, nondistended with good bowel sounds heard  EXTREMITIES:  no swelling  NEUROLOGICAL:  limited neuro exam    Labs:                           10.5   7.1   )-----------( 118      ( 04 Feb 2018 06:26 )             31.8                       10.7   9.1   )-----------( 119      ( 03 Feb 2018 05:54 )             32.7   02-03    146<H>  |  114<H>  |  64<H>  ----------------------------<  111<H>  4.4   |  22  |  2.35<H>    Ca    9.0      03 Feb 2018 05:54    TPro  6.9  /  Alb  2.9<L>  /  TBili  0.5  /  DBili  x   /  AST  63<H>  /  ALT  47  /  AlkPhos  81  02-02      CT of the chest:   Scattered tree-in-bud opacities along the lungs may represent small   airways disease/infection. No dense consolidation is seen    No evidence of bowel obstruction.    Foci of high density along the urinary bladder posteriorly may represent   multiple layering bladder calculi versus wall calcifications. Correlate   with urologic examination    # Sepsis secondary to viral illness / CAP  - started on Tamiflu 30 mg po qdaily in a setting of renal failure   - ceftriaxone 1 gm IV qd / doxycycline 100 mg IV q 12h / oseltamivir 30 mg PO qd # 5 (patient spit out meds this AM)  - Dupplex LE neg for DVT / low Prob V/Q scan. Off Hep drip.   - hold metoprolol and resume Flomax    # acute on chronic hypoxic respiratory failure secondary to acute COPD excerebration / CAP / Acute Influenza causing BRONCHIOLITIS / Mucus plugging cont factor to hypoxemia  - s/p 4 days IV Solumedrol --> will stop steroids, no need for further use, no wheezing and respiratory status improving.   - IV Rocephin / doxy start date 1/31  - cont nebs  - smoking cessation    # mild cognitive impairment /  sundowning  # Delirium   - worsened in the setting of steroids. WILL STOP STEROIDS NOW, no wheezing. Cont IM Haldol q4hr PRN and Seroquel QHS 25 --> add AM dose.   - discussed w/ daughters for outpatient dementia workup with neuro. Inpatient work up negative: neg syphillis screen / B12 normal.     # Elevated troponin secondary to demand ischemia secondary to flue  - normalized  - started on ASA/STATIN    # ARF (tremulousness likely due to Uremia) / r/o MM / obstruction  - cont fluids  - hold losartan  - US and CT bladder  - SPEP and UPEP, Bence Salgado proteins    # Thrombocytopenia, anemia, elevated ESR - likely 2/2 sepsis as platelet count rising.     # DVT prophylaxis-  venodynes  - start subQ Heparin.     Dispo: remain inpatient on telemetry. Case discussed in depth w/ family and staff. Anticipate dc home once delirium resolves.   Total time > 45 mins. Chief Complaint/Reason for Admission: weakness, low blood pressure and hypoxia	   History of Present Illness: 	  Pt is an 83 y/o gentleman with a PMHx of HTN, BPH, anxiety and current smoking who was sent to the ER from Dr. Miller's office.  He was having weakness/loss of balance and tremors for 4 days with decreased oral intake.  The office reports pt was suddenly found to be  hypoxic to 67% on RA, with HR 31 and BP 80/60, Temp 99F.   EMS reported  pt had normal vital signs, but then en route pt's blood pressure dropped to 80/40, his O2 sat was fluctuating, and pt was cyanotic with +cough at baseline.    Pt has not been eating much for the last two days, due to poor appetite.   Pt received a PNA shot 2 weeks ago.  He does not like to see the doctor.    Hospital Course: Low prob VQ scan for PE, instead for to have Influenza and underwent treatment for superimposed CAP w/ abx. Course complicated by acute delirium requiring 1:1 sitter and IM Haldol.     Sub: Still agitated and delirium, no UTI (on abx), urinating w/o difficulty. Wife at bedside, afebrile.     REVIEW OF SYSTEMS:  as per HPI    Vital Signs Last 24 Hrs  T(C): 36.8 (04 Feb 2018 09:58), Max: 37 (03 Feb 2018 16:46)  T(F): 98.3 (04 Feb 2018 09:58), Max: 98.6 (03 Feb 2018 16:46)  HR: 88 (04 Feb 2018 09:58) (62 - 88)  BP: 126/78 (04 Feb 2018 09:58) (126/78 - 138/68)  BP(mean): --  RR: 18 (04 Feb 2018 09:58) (18 - 18)  SpO2: 97% (04 Feb 2018 09:58) (96% - 100%)    Physical Exam:   GENERAL APPEARANCE:  elderly, deconditioned, frail appearing and agitated.   HEENT:  Head is normocephalic    Skin:  dry  NECK:  no JVD  HEART:  Regular rate and rhythm. normal S1 and S2, No M/R/G  LUNGS:  decreased lung sounds /  less rhonchi, no wheezing  ABDOMEN:  Soft, nontender, nondistended with good bowel sounds heard  EXTREMITIES:  no swelling  NEUROLOGICAL:  limited neuro exam    Labs:                           10.5   7.1   )-----------( 118      ( 04 Feb 2018 06:26 )             31.8                       10.7   9.1   )-----------( 119      ( 03 Feb 2018 05:54 )             32.7   02-03    146<H>  |  114<H>  |  64<H>  ----------------------------<  111<H>  4.4   |  22  |  2.35<H>    Ca    9.0      03 Feb 2018 05:54    TPro  6.9  /  Alb  2.9<L>  /  TBili  0.5  /  DBili  x   /  AST  63<H>  /  ALT  47  /  AlkPhos  81  02-02      CT of the chest:   Scattered tree-in-bud opacities along the lungs may represent small   airways disease/infection. No dense consolidation is seen    No evidence of bowel obstruction.    Foci of high density along the urinary bladder posteriorly may represent   multiple layering bladder calculi versus wall calcifications. Correlate   with urologic examination    # Sepsis secondary to viral illness / CAP  - started on Tamiflu 30 mg po qdaily in a setting of renal failure   - ceftriaxone 1 gm IV qd / doxycycline 100 mg IV q 12h / oseltamivir 30 mg PO qd # 5 (patient spit out meds this AM)  - Dupplex LE neg for DVT / low Prob V/Q scan. Off Hep drip.   - hold metoprolol and resume Flomax    # acute on chronic hypoxic respiratory failure secondary to acute COPD excerebration / CAP / Acute Influenza causing BRONCHIOLITIS / Mucus plugging cont factor to hypoxemia  - s/p 4 days IV Solumedrol --> will stop steroids, no need for further use, no wheezing and respiratory status improving.   - IV Rocephin / doxy start date 1/31  - cont nebs  - smoking cessation    # mild cognitive impairment /  sundowning  # Delirium   - worsened in the setting of steroids. WILL STOP STEROIDS NOW, no wheezing. Cont IM Haldol q4hr PRN and Seroquel QHS 25 --> add AM dose.   - discussed w/ daughters for outpatient dementia workup with neuro. Inpatient work up negative: neg syphillis screen / B12 normal.   - TSH mildly decreased, normal Free T4 --> likely sick euthyroid.     # Elevated troponin secondary to demand ischemia secondary to flue  - normalized  - started on ASA/STATIN    # ARF (tremulousness likely due to Uremia) / r/o MM / obstruction  - cont fluids  - hold losartan  - US and CT bladder  - SPEP and UPEP, Bence Salgado proteins    # Thrombocytopenia, anemia, elevated ESR - likely 2/2 sepsis as platelet count rising.     # DVT prophylaxis-  venodynes  - start subQ Heparin.     Dispo: remain inpatient on telemetry. Case discussed in depth w/ family and staff. Anticipate dc home once delirium resolves.   Total time > 45 mins.

## 2018-02-05 LAB
AMMONIA BLD-MCNC: 17 UMOL/L — SIGNIFICANT CHANGE UP (ref 11–32)
ANION GAP SERPL CALC-SCNC: 10 MMOL/L — SIGNIFICANT CHANGE UP (ref 5–17)
APPEARANCE UR: (no result)
BACTERIA # UR AUTO: (no result)
BILIRUB UR-MCNC: NEGATIVE — SIGNIFICANT CHANGE UP
BUN SERPL-MCNC: 56 MG/DL — HIGH (ref 7–23)
CALCIUM SERPL-MCNC: 8.7 MG/DL — SIGNIFICANT CHANGE UP (ref 8.5–10.1)
CHLORIDE SERPL-SCNC: 114 MMOL/L — HIGH (ref 96–108)
CO2 SERPL-SCNC: 21 MMOL/L — LOW (ref 22–31)
COLOR SPEC: YELLOW — SIGNIFICANT CHANGE UP
CREAT SERPL-MCNC: 2.79 MG/DL — HIGH (ref 0.5–1.3)
DIFF PNL FLD: (no result)
EPI CELLS # UR: SIGNIFICANT CHANGE UP
GLUCOSE SERPL-MCNC: 168 MG/DL — HIGH (ref 70–99)
GLUCOSE UR QL: NEGATIVE MG/DL — SIGNIFICANT CHANGE UP
GRAN CASTS # UR COMP ASSIST: (no result) /LPF
HCT VFR BLD CALC: 35.2 % — LOW (ref 39–50)
HGB BLD-MCNC: 11.5 G/DL — LOW (ref 13–17)
KETONES UR-MCNC: NEGATIVE — SIGNIFICANT CHANGE UP
LEUKOCYTE ESTERASE UR-ACNC: NEGATIVE — SIGNIFICANT CHANGE UP
MCHC RBC-ENTMCNC: 29.7 PG — SIGNIFICANT CHANGE UP (ref 27–34)
MCHC RBC-ENTMCNC: 32.8 GM/DL — SIGNIFICANT CHANGE UP (ref 32–36)
MCV RBC AUTO: 90.7 FL — SIGNIFICANT CHANGE UP (ref 80–100)
NITRITE UR-MCNC: NEGATIVE — SIGNIFICANT CHANGE UP
PH UR: 5 — SIGNIFICANT CHANGE UP (ref 5–8)
PLATELET # BLD AUTO: 140 K/UL — LOW (ref 150–400)
POTASSIUM SERPL-MCNC: 3.5 MMOL/L — SIGNIFICANT CHANGE UP (ref 3.5–5.3)
POTASSIUM SERPL-SCNC: 3.5 MMOL/L — SIGNIFICANT CHANGE UP (ref 3.5–5.3)
PROT UR-MCNC: 30 MG/DL
RBC # BLD: 3.88 M/UL — LOW (ref 4.2–5.8)
RBC # FLD: 12.8 % — SIGNIFICANT CHANGE UP (ref 10.3–14.5)
RBC CASTS # UR COMP ASSIST: >50 /HPF (ref 0–4)
SODIUM SERPL-SCNC: 145 MMOL/L — SIGNIFICANT CHANGE UP (ref 135–145)
SP GR SPEC: 1.01 — SIGNIFICANT CHANGE UP (ref 1.01–1.02)
UROBILINOGEN FLD QL: NEGATIVE MG/DL — SIGNIFICANT CHANGE UP
WBC # BLD: 7 K/UL — SIGNIFICANT CHANGE UP (ref 3.8–10.5)
WBC # FLD AUTO: 7 K/UL — SIGNIFICANT CHANGE UP (ref 3.8–10.5)
WBC UR QL: SIGNIFICANT CHANGE UP

## 2018-02-05 RX ORDER — FINASTERIDE 5 MG/1
5 TABLET, FILM COATED ORAL DAILY
Qty: 0 | Refills: 0 | Status: DISCONTINUED | OUTPATIENT
Start: 2018-02-05 | End: 2018-02-09

## 2018-02-05 RX ADMIN — Medication 3 MILLILITER(S): at 13:05

## 2018-02-05 RX ADMIN — BUDESONIDE AND FORMOTEROL FUMARATE DIHYDRATE 2 PUFF(S): 160; 4.5 AEROSOL RESPIRATORY (INHALATION) at 07:55

## 2018-02-05 RX ADMIN — Medication 3 MILLILITER(S): at 20:03

## 2018-02-05 RX ADMIN — FINASTERIDE 5 MILLIGRAM(S): 5 TABLET, FILM COATED ORAL at 22:06

## 2018-02-05 RX ADMIN — Medication 3 MILLILITER(S): at 07:52

## 2018-02-05 RX ADMIN — SODIUM CHLORIDE 80 MILLILITER(S): 9 INJECTION, SOLUTION INTRAVENOUS at 01:18

## 2018-02-05 RX ADMIN — TAMSULOSIN HYDROCHLORIDE 0.4 MILLIGRAM(S): 0.4 CAPSULE ORAL at 23:30

## 2018-02-05 RX ADMIN — Medication 1 PATCH: at 05:57

## 2018-02-05 RX ADMIN — Medication 1 PATCH: at 12:38

## 2018-02-05 RX ADMIN — ATORVASTATIN CALCIUM 40 MILLIGRAM(S): 80 TABLET, FILM COATED ORAL at 23:30

## 2018-02-05 RX ADMIN — HALOPERIDOL DECANOATE 2 MILLIGRAM(S): 100 INJECTION INTRAMUSCULAR at 18:05

## 2018-02-05 RX ADMIN — Medication 1 PATCH: at 12:27

## 2018-02-05 RX ADMIN — HEPARIN SODIUM 5000 UNIT(S): 5000 INJECTION INTRAVENOUS; SUBCUTANEOUS at 22:06

## 2018-02-05 NOTE — PROGRESS NOTE ADULT - SUBJECTIVE AND OBJECTIVE BOX
Patient is a 82y old  Male who presents with a chief complaint of weakness, low blood pressure and hypoxia (29 Jan 2018 19:04)    HPI:  83 y/o gentleman with h/o HTN, BPH, anxiety, COPD, neuropathy, skin Ca lesions s/p removal was admitted on 1/29 from Dr. Miller's office for increased weakness, hypoxia. He had weakness/loss of balance and tremors with decreased oral intake x 4 days PTA.  The office reports pt was found to be  hypoxic to 67% on RA, with HR 31 and BP 80/60, Temp 99F.   EMS reported normal vital signs, but then en route to the hospital pt's blood pressure dropped to 80/40, his O2 sat was fluctuating, and pt was cyanotic. Pt has not been eating much for the last two days, due to poor appetite.  No abd complaints. He reports intermittent burning with urination.  His wife reports he is barely able to walk and has been stumbling. In ER, he received ceftriaxone and doxycycline.     OOB to chair  Weak looking  Mild SOB; mild confusion  Has dry cough    MEDICATIONS  (STANDING):  ALBUTerol/ipratropium for Nebulization 3 milliLiter(s) Nebulizer every 4 hours  aspirin  chewable 81 milliGRAM(s) Oral daily  atorvastatin 40 milliGRAM(s) Oral at bedtime  buDESOnide 160 MICROgram(s)/formoterol 4.5 MICROgram(s) Inhaler 2 Puff(s) Inhalation two times a day  dextrose 5%. 1000 milliLiter(s) (80 mL/Hr) IV Continuous <Continuous>  heparin  Injectable 5000 Unit(s) SubCutaneous every 12 hours  nicotine -  14 mG/24Hr(s) Patch 1 patch Transdermal daily  oseltamivir 30 milliGRAM(s) Oral daily  QUEtiapine 25 milliGRAM(s) Oral daily  QUEtiapine 25 milliGRAM(s) Oral at bedtime  tamsulosin 0.4 milliGRAM(s) Oral at bedtime    MEDICATIONS  (PRN):  acetaminophen   Tablet. 650 milliGRAM(s) Oral every 6 hours PRN Mild Pain (1 - 3)  calcium carbonate 500 mG (Tums) Chewable 2 Tablet(s) Chew every 4 hours PRN Heartburn  haloperidol    Injectable 2 milliGRAM(s) IntraMuscular every 4 hours PRN Agitation      Vital Signs Last 24 Hrs  T(C): 36.2 (05 Feb 2018 10:11), Max: 36.7 (05 Feb 2018 04:55)  T(F): 97.2 (05 Feb 2018 10:11), Max: 98.1 (05 Feb 2018 04:55)  HR: 79 (05 Feb 2018 10:11) (77 - 79)  BP: 131/77 (05 Feb 2018 10:11) (120/69 - 131/77)  BP(mean): --  RR: 18 (05 Feb 2018 10:11) (18 - 18)  SpO2: 95% (05 Feb 2018 10:11) (93% - 95%)    Physical Exam:      Constitutional: frail looking  HEENT: NC/AT, EOMI, PERRLA  Neck: supple  Back: no tenderness  Respiratory: crackles at bases; few wheezes  Cardiovascular: S1S2 regular, no murmurs  Abdomen: soft, not tender, not distended, positive BS  Genitourinary: no LN palpable  Rectal: deferred  Musculoskeletal: no muscle tenderness, no joint swelling or tenderness  Extremities: no pedal edema  Neurological: AxOx3, moving all extremities  Skin: no rashes    Labs:                        11.5   7.0   )-----------( 140      ( 05 Feb 2018 06:25 )             35.2     02-05    145  |  114<H>  |  56<H>  ----------------------------<  168<H>  3.5   |  21<L>  |  2.79<H>    Ca    8.7      05 Feb 2018 06:25                        9.8    7.2   )-----------( 89       ( 01 Feb 2018 05:35 )             30.0     02-01    144  |  113<H>  |  63<H>  ----------------------------<  174<H>  4.4   |  24  |  2.94<H>    Ca    8.5      01 Feb 2018 12:04                        9.9    6.9   )-----------( 73       ( 31 Jan 2018 07:33 )             29.8     01-31    142  |  108  |  60<H>  ----------------------------<  142<H>  4.3   |  23  |  3.19<H>    Ca    8.1<L>      31 Jan 2018 07:33    TPro  6.5  /  Alb  x   /  TBili  x   /  DBili  x   /  AST  x   /  ALT  x   /  AlkPhos  x   01-29     LIVER FUNCTIONS - ( 29 Jan 2018 21:54 )  Alb: x     / Pro: 6.5 g/dL / ALK PHOS: x     / ALT: x     / AST: x     / GGT: x           Rapid Respiratory Viral Panel (01.29.18 @ 20:41)    Rapid RVP Result: Detected: The [facility] RVP Rapid uses polymerase chain reaction (PCR) and melt  curve analysis to screen for adenovirus; coronavirus HKU1, NL63, 229E,  OC43; human metapneumovirus (hMPV); human enterovirus/rhinovirus  (Entero/RV); influenza A; influenza A/H1;influenza A/H3; influenza  A/H1-2009; influenza B; parainfluenza viruses 1, 2, 3, 4; respiratory  syncytial virus; Bordetella pertussis; Mycoplasma pneumoniae; and  Chlamydophila pneumoniae.    Influenza AH3 (RapRVP): Detected: Test Name:flu a h3  Called by:_vvsosaes  Called to:_    MHyisel RN  Read back 2 Pt IDs:_y Read back values:_y Date/Tm:_01/30/18 01:58      Culture - Blood (collected 29 Jan 2018 17:22)  Source: .Blood None  Preliminary Report (31 Jan 2018 01:02):    No growth to date.    Culture - Blood (collected 29 Jan 2018 17:22)  Source: .Blood None  Preliminary Report (31 Jan 2018 01:02):    No growth to date.    Culture - Urine (collected 29 Jan 2018 01:07)  Source: .Urine Clean Catch (Midstream)  Final Report (31 Jan 2018 11:45):    No growth          Radiology:    < from: CT Chest No Cont (01.29.18 @ 23:21) >  Scattered tree-in-bud opacities along the lungs may represent small   airways disease/infection. No dense consolidation is seen    No evidence of bowel obstruction.    Foci of high density along the urinary bladder posteriorly may represent   multiple layering bladder calculi versus wall calcifications. Correlate   with urologic examination    < end of copied text >      Advanced directives addressed: full resuscitation

## 2018-02-05 NOTE — PROGRESS NOTE ADULT - SUBJECTIVE AND OBJECTIVE BOX
Chief Complaint/Reason for Admission: weakness, low blood pressure and hypoxia	   History of Present Illness: 	  Pt is an 83 y/o gentleman with a PMHx of HTN, BPH, anxiety and current smoking who was sent to the ER from Dr. Miller's office.  He was having weakness/loss of balance and tremors for 4 days PTA with decreased oral intake.  The office reports pt was suddenly found to be  hypoxic to 67% on RA, with HR 31 and BP 80/60, Temp 99F.   EMS reported  pt had normal vital signs, but then en route pt's blood pressure dropped to 80/40, his O2 sat was fluctuating, and pt was cyanotic with +cough at baseline.     Pt received a PNA shot 2 weeks ago.  He does not like to see the doctor. According to the DTRs, he was very functional and verbal prior to this admission. Was able to care for himself and was able to  complete crossword puzzles. No recent travel or sick contacts. No new meds per dtaughters.       2/5: PT very withdrawn and non verbal; in nad; afebrile; no evidence for infection at present; +ve urinary retention nearly 2L this am                Hospital Course: Low prob VQ scan for PE, instead for to have Influenza and underwent treatment for superimposed CAP w/ abx. Course complicated by acute delirium requiring 1:1 sitter and IM Haldol.     Sub: Still agitated and delirium, no UTI (on abx), urinating w/o difficulty. Wife at bedside, afebrile.     REVIEW OF SYSTEMS:  as per HPI    ICU Vital Signs Last 24 Hrs  T(C): 36.8 (05 Feb 2018 14:55), Max: 36.8 (05 Feb 2018 14:55)  T(F): 98.3 (05 Feb 2018 14:55), Max: 98.3 (05 Feb 2018 14:55)  HR: 98 (05 Feb 2018 14:55) (77 - 98)  BP: 103/70 (05 Feb 2018 14:55) (103/70 - 131/77)  BP(mean): --  ABP: --  ABP(mean): --  RR: 18 (05 Feb 2018 14:55) (18 - 18)  SpO2: 96% (05 Feb 2018 14:55) (93% - 96%)        Physical Exam:   GENERAL APPEARANCE:  elderly, deconditioned, frail appearing and agitated.   HEENT:  Head is normocephalic    Skin:  dry  NECK:  no JVD  HEART:  Regular rate and rhythm. normal S1 and S2, No M/R/G  LUNGS:  decreased lung sounds /  less rhonchi, no wheezing  ABDOMEN:  Soft, nontender, nondistended with good bowel sounds heard  EXTREMITIES:  no swelling  NEUROLOGICAL:  limited neuro exam    Labs:                           10.5   7.1   )-----------( 118      ( 04 Feb 2018 06:26 )             31.8                       10.7   9.1   )-----------( 119      ( 03 Feb 2018 05:54 )             32.7   02-03    146<H>  |  114<H>  |  64<H>  ----------------------------<  111<H>  4.4   |  22  |  2.35<H>    Ca    9.0      03 Feb 2018 05:54    TPro  6.9  /  Alb  2.9<L>  /  TBili  0.5  /  DBili  x   /  AST  63<H>  /  ALT  47  /  AlkPhos  81  02-02      CT of the chest:   Scattered tree-in-bud opacities along the lungs may represent small   airways disease/infection. No dense consolidation is seen    No evidence of bowel obstruction.    Foci of high density along the urinary bladder posteriorly may represent   multiple layering bladder calculi versus wall calcifications. Correlate   with urologic examination    # Sepsis secondary to viral illness / CAP  - started on Tamiflu 30 mg po qdaily in a setting of renal failure   - ceftriaxone 1 gm IV qd / doxycycline 100 mg IV q 12h / oseltamivir 30 mg PO qd # 5 (patient spit out meds this AM)  - Dupplex LE neg for DVT / low Prob V/Q scan. Off Hep drip.   - hold metoprolol and resume Flomax    # acute on chronic hypoxic respiratory failure secondary to acute COPD excerebration / CAP / Acute Influenza causing BRONCHIOLITIS / Mucus plugging cont factor to hypoxemia  - s/p 4 days IV Solumedrol --> will stop steroids, no need for further use, no wheezing and respiratory status improving.   - IV Rocephin / doxy start date 1/31  - cont nebs  - smoking cessation    # cognitive impairment /  sundowning  # Delirium   - check Ammonia level. CT head. Neuro consult  - No other infectious process identified. TSH, RPR nl/neg respectively. Repeat UA in setting of urine retention  - worsened in the setting of steroids. WILL STOP STEROIDS NOW, no wheezing. Cont IM Haldol q4hr PRN and Seroquel QHS 25 --> add AM dose.   - discussed w/ daughters for outpatient dementia workup with neuro. Inpatient work up negative: neg syphillis screen / B12 normal.   - TSH mildly decreased, normal Free T4 --> likely sick euthyroid.     # Elevated troponin secondary to demand ischemia secondary to flue  - normalized  - started on ASA/STATIN    # ARF   - cont fluids  - hold losartan  - US and CT bladder: no evidence of hydro/mass/stone      # Thrombocytopenia, anemia, elevated ESR -  Likely consumtiive from sepsis  now resolving- no evidence of bleeding    # DVT prophylaxis-  venodynes  - start subQ Heparin.     Dispo: remain inpatient on telemetry. Case discussed in depth w/ family and staff. Called PCP - left message awaiting call back to further discuss his baseline mentla status.   Total time > 45 mins.

## 2018-02-05 NOTE — PROGRESS NOTE ADULT - SUBJECTIVE AND OBJECTIVE BOX
Patient is a 82y Male with PMH of CKD3 (baseline SCr 1.8 on 2016), HTN, hyperlipidemia, BPH, current smoker sent by PCP Dr. Miller due to hypoxia 67%, hypotension and weakness. Also with low appetite and dysuria and instability when walking. On Admission, found to have +Influenza and ZELDA (B/Cr 51/4.04). Called for renal evaluation.    Denies h/o kidney disease. Does not see outside nephrologist. Minimal po intake. +dysuria, no hematuria, no h/o stones per patient. Takes max dose ARB as outpatient.    Receiving IVF, supplemental oxygen. Also receiving Tamiflu, IV steroids and Ceftriaxone. s/p V/Q scan - negative for PE.  2/5 - lethargic, SNa improving on d5W, minimal po intake    REVIEW OF SYSTEMS:    UTO confusion/lethargy.    MEDICATIONS  (STANDING):  ALBUTerol/ipratropium for Nebulization 3 milliLiter(s) Nebulizer every 4 hours  aspirin  chewable 81 milliGRAM(s) Oral daily  atorvastatin 40 milliGRAM(s) Oral at bedtime  buDESOnide 160 MICROgram(s)/formoterol 4.5 MICROgram(s) Inhaler 2 Puff(s) Inhalation two times a day  dextrose 5%. 1000 milliLiter(s) (80 mL/Hr) IV Continuous <Continuous>  heparin  Injectable 5000 Unit(s) SubCutaneous every 12 hours  nicotine -  14 mG/24Hr(s) Patch 1 patch Transdermal daily  oseltamivir 30 milliGRAM(s) Oral daily  QUEtiapine 25 milliGRAM(s) Oral daily  QUEtiapine 25 milliGRAM(s) Oral at bedtime  tamsulosin 0.4 milliGRAM(s) Oral at bedtime      Vital Signs Last 24 Hrs  T(C): 36.7 (2018 04:55), Max: 36.8 (2018 09:58)  T(F): 98.1 (2018 04:55), Max: 98.3 (2018 09:58)  HR: 77 (2018 08:02) (77 - 88)  BP: 120/69 (2018 04:55) (120/69 - 126/78)  BP(mean): --  RR: 18 (2018 04:55) (18 - 18)  SpO2: 93% (2018 04:55) (93% - 97%)  Daily     Daily Weight in k.8 (2018 09:58)  I&O's Summary    2018 07:01  -  2018 07:00  --------------------------------------------------------  IN: 1370 mL / OUT: 0 mL / NET: 1370 mL            PHYSICAL EXAM:    Constitutional: frail, lethargic  HEENT: PERRLA, EOMI,  MMM  Neck: No LAD, No JVD  Respiratory: scatt rhonchi  Cardiovascular: S1 and S2, RRR  Gastrointestinal: BS+, +abdominal distention, +ttp in suprapubic region  Extremities: No peripheral edema  Neurological: lethargic  : No Schreiber, +diaper  Skin: No rashes  Access: Not applicable        LABS:                        10.5   7.1   )-----------( 118      ( 2018 06:26 )             31.8     02-05    145  |  114<H>  |  56<H>  ----------------------------<  168<H>  3.5   |  21<L>  |  2.79<H>    Ca    8.7      2018 06:25      2018 06:26    147    |  117    |  58     ----------------------------<  92     3.7     |  21     |  1.97   2018 05:54    146    |  114    |  64     ----------------------------<  111    4.4     |  22     |  2.35   2018 13:06    144    |  113    |  60     ----------------------------<  128    4.3     |  25     |  2.40   2018 12:04    144    |  113    |  63     ----------------------------<  174    4.4     |  24     |  2.94     Ca    8.5        2018 06:26  Ca    9.0        2018 05:54  Ca    9.1        2018 13:06  Ca    8.5        2018 12:04    TPro  6.9    /  Alb  2.9    /  TBili  0.5    /  DBili  x      /  AST  63     /  ALT  47     /  AlkPhos  81     2018 13:06          Urine Studies:          RADIOLOGY & ADDITIONAL STUDIES:

## 2018-02-06 DIAGNOSIS — N21.0 CALCULUS IN BLADDER: ICD-10-CM

## 2018-02-06 DIAGNOSIS — N40.1 BENIGN PROSTATIC HYPERPLASIA WITH LOWER URINARY TRACT SYMPTOMS: ICD-10-CM

## 2018-02-06 DIAGNOSIS — R33.9 RETENTION OF URINE, UNSPECIFIED: ICD-10-CM

## 2018-02-06 LAB
ANION GAP SERPL CALC-SCNC: 6 MMOL/L — SIGNIFICANT CHANGE UP (ref 5–17)
BUN SERPL-MCNC: 52 MG/DL — HIGH (ref 7–23)
CALCIUM SERPL-MCNC: 8.6 MG/DL — SIGNIFICANT CHANGE UP (ref 8.5–10.1)
CHLORIDE SERPL-SCNC: 115 MMOL/L — HIGH (ref 96–108)
CO2 SERPL-SCNC: 24 MMOL/L — SIGNIFICANT CHANGE UP (ref 22–31)
CREAT SERPL-MCNC: 2.33 MG/DL — HIGH (ref 0.5–1.3)
FOLATE SERPL-MCNC: 3.3 NG/ML — LOW (ref 4.8–24.2)
GLUCOSE SERPL-MCNC: 129 MG/DL — HIGH (ref 70–99)
HCT VFR BLD CALC: 31.2 % — LOW (ref 39–50)
HGB BLD-MCNC: 10.4 G/DL — LOW (ref 13–17)
MCHC RBC-ENTMCNC: 30 PG — SIGNIFICANT CHANGE UP (ref 27–34)
MCHC RBC-ENTMCNC: 33.3 GM/DL — SIGNIFICANT CHANGE UP (ref 32–36)
MCV RBC AUTO: 90.3 FL — SIGNIFICANT CHANGE UP (ref 80–100)
PLATELET # BLD AUTO: 131 K/UL — LOW (ref 150–400)
POTASSIUM SERPL-MCNC: 3.6 MMOL/L — SIGNIFICANT CHANGE UP (ref 3.5–5.3)
POTASSIUM SERPL-SCNC: 3.6 MMOL/L — SIGNIFICANT CHANGE UP (ref 3.5–5.3)
RBC # BLD: 3.45 M/UL — LOW (ref 4.2–5.8)
RBC # FLD: 13.1 % — SIGNIFICANT CHANGE UP (ref 10.3–14.5)
SODIUM SERPL-SCNC: 145 MMOL/L — SIGNIFICANT CHANGE UP (ref 135–145)
WBC # BLD: 5.6 K/UL — SIGNIFICANT CHANGE UP (ref 3.8–10.5)
WBC # FLD AUTO: 5.6 K/UL — SIGNIFICANT CHANGE UP (ref 3.8–10.5)

## 2018-02-06 PROCEDURE — 70450 CT HEAD/BRAIN W/O DYE: CPT | Mod: 26

## 2018-02-06 PROCEDURE — 99222 1ST HOSP IP/OBS MODERATE 55: CPT

## 2018-02-06 RX ORDER — BETHANECHOL CHLORIDE 25 MG
10 TABLET ORAL THREE TIMES A DAY
Qty: 0 | Refills: 0 | Status: DISCONTINUED | OUTPATIENT
Start: 2018-02-06 | End: 2018-02-09

## 2018-02-06 RX ADMIN — QUETIAPINE FUMARATE 25 MILLIGRAM(S): 200 TABLET, FILM COATED ORAL at 11:42

## 2018-02-06 RX ADMIN — Medication 81 MILLIGRAM(S): at 11:41

## 2018-02-06 RX ADMIN — Medication 10 MILLIGRAM(S): at 11:41

## 2018-02-06 RX ADMIN — SODIUM CHLORIDE 80 MILLILITER(S): 9 INJECTION, SOLUTION INTRAVENOUS at 05:48

## 2018-02-06 RX ADMIN — Medication 30 MILLIGRAM(S): at 11:41

## 2018-02-06 RX ADMIN — Medication 3 MILLILITER(S): at 11:46

## 2018-02-06 RX ADMIN — Medication 3 MILLILITER(S): at 16:02

## 2018-02-06 RX ADMIN — Medication 3 MILLILITER(S): at 07:56

## 2018-02-06 RX ADMIN — FINASTERIDE 5 MILLIGRAM(S): 5 TABLET, FILM COATED ORAL at 11:41

## 2018-02-06 RX ADMIN — HEPARIN SODIUM 5000 UNIT(S): 5000 INJECTION INTRAVENOUS; SUBCUTANEOUS at 05:56

## 2018-02-06 RX ADMIN — Medication 3 MILLILITER(S): at 20:08

## 2018-02-06 NOTE — CHART NOTE - NSCHARTNOTEFT_GEN_A_CORE
Nurse call from floor    Patient is retaining urine, 3 straight caths have been administered to patient without independent voiding. Urology note appreciated, patient might benefit from indwelling romero for urinary retention. Will notify primary hospitalist for further management.

## 2018-02-06 NOTE — CONSULT NOTE ADULT - SUBJECTIVE AND OBJECTIVE BOX
HPI:  81 y/o male with PMHx of HTN, BPH, anxiety and smoking was sent to the ER from Dr. Miller's office on 1/29/18 with weakness/loss of balance and tremors for 4 days with decreased oral intake/poor appetite, pt was hypoxic, bradycardic with HR 31 and hypotensive 80/60 and temp 99 F. Pt has been treated for ARF, COPD exac, Sepsis/ viral illness/ Influenza A  and Pneumonia; s/p 4 days IV Solumedrol/AB, was seen by ID.    During the hospital course pt has been delirius  CT scan ABD/Pelvis revealed layering dense material in the bladder, most likely debri or small calculi due to incomplete bladder emptying.   Patient developed AUR, Schreiber placed, 2 liters   Patient refusing to take tamsulosin, on finasteride, immobilized, constipated       PMH/PSH:   HTN,    BPH,   Tobacco Dependance 60 pack years  Bronchitis/Likely COPD  anxiety   Neuropathy  Skin cancer lesions removed from face and back    Fam Hx:  Non-contrib to current adm (29 Jan 2018 19:04)    Social Hx: Pt is retired from working with [employer].  He lives with his wife. Currently smokes 5 - 7 cig/day.  60 pack year smoking.  Pt and family report 1 glass of wine/week.  No drug use. Nonsmoker, no drug or alcohol use    MEDICATIONS  (STANDING):  ALBUTerol/ipratropium for Nebulization 3 milliLiter(s) Nebulizer every 4 hours  aspirin  chewable 81 milliGRAM(s) Oral daily  atorvastatin 40 milliGRAM(s) Oral at bedtime  bethanechol 10 milliGRAM(s) Oral three times a day  buDESOnide 160 MICROgram(s)/formoterol 4.5 MICROgram(s) Inhaler 2 Puff(s) Inhalation two times a day  finasteride 5 milliGRAM(s) Oral daily  heparin  Injectable 5000 Unit(s) SubCutaneous every 12 hours  nicotine -  14 mG/24Hr(s) Patch 1 patch Transdermal daily  oseltamivir 30 milliGRAM(s) Oral daily  QUEtiapine 25 milliGRAM(s) Oral daily  QUEtiapine 25 milliGRAM(s) Oral at bedtime  tamsulosin 0.4 milliGRAM(s) Oral at bedtime     Allergies  No Known Allergies  Intolerances    ROS: Pertinent positives in HPI, all other ROS were reviewed and are negative.      Vital Signs Last 24 Hrs  T(C): 36.2 (06 Feb 2018 09:54), Max: 36.9 (05 Feb 2018 19:48)  T(F): 97.2 (06 Feb 2018 09:54), Max: 98.5 (05 Feb 2018 19:48)  HR: 82 (06 Feb 2018 11:55) (78 - 102)  BP: 138/62 (06 Feb 2018 09:54) (105/60 - 155/64)  BP(mean): --  RR: 18 (06 Feb 2018 09:54) (18 - 18)  SpO2: 96% (06 Feb 2018 09:54) (90% - 96%)    PE:  Constitutional: awake and alert.  HEENT: No masses,   Neck: Hunched posture, limited extension.  Respiratory: Breath sounds are clear bilaterally  Cardiovascular: S1 and S2, regular   Extremities:  no edema  Vascular: Caritid Bruit - no  Musculoskeletal: no joint swelling/tenderness, no tremor  Skin: No rashes   Testes normal, non tender  Schreiber in place  FRANK refused

## 2018-02-06 NOTE — PROGRESS NOTE ADULT - SUBJECTIVE AND OBJECTIVE BOX
CC: 82 y old  Male who presents with a chief complaint of weakness, low blood pressure and hypoxia (29 Jan 2018 19:04)    HPI:  81 y/o male with PMHx of HTN, BPH, anxiety and smoking was sent to the ER from Dr. Miller's office on 1/29/18 with weakness/loss of balance and tremors for 4 days with decreased oral intake/poor appetite, pt was hypoxic, bradycardic with HR 31 and hypotensive 80/60 and temp 99 F. Pt has been treated for ARF, COPD exac, Sepsis/ viral illness/ Influenza A  and Pneumonia; s/p 4 days IV Solumedrol/AB, was seen by ID.    During the hospital course pt has been delirius, neuro consulted for that reason.    At the time of my exam, pt is sitting in everett chair, wife by his bedside, his mental status is much improved, he is communicating minimally with wife; as per wife he usually walks with a stooped/hunched posture; at times stumbles has gait /postural instability, otherwise has been functional     PMH/PSH:   HTN,    BPH,   Tobacco Dependance 60 pack years  Bronchitis/Likely COPD  anxiety   Neuropathy  Skin cancer lesions removed from face and back    Fam Hx:  Non-contrib to current adm (29 Jan 2018 19:04)    Social Hx: Pt is retired from working with [employer].  He lives with his wife. Currently smokes 5 - 7 cig/day.  60 pack year smoking.  Pt and family report 1 glass of wine/week.  No drug use. Nonsmoker, no drug or alcohol use    MEDICATIONS  (STANDING):  ALBUTerol/ipratropium for Nebulization 3 milliLiter(s) Nebulizer every 4 hours  aspirin  chewable 81 milliGRAM(s) Oral daily  atorvastatin 40 milliGRAM(s) Oral at bedtime  bethanechol 10 milliGRAM(s) Oral three times a day  buDESOnide 160 MICROgram(s)/formoterol 4.5 MICROgram(s) Inhaler 2 Puff(s) Inhalation two times a day  finasteride 5 milliGRAM(s) Oral daily  heparin  Injectable 5000 Unit(s) SubCutaneous every 12 hours  nicotine -  14 mG/24Hr(s) Patch 1 patch Transdermal daily  oseltamivir 30 milliGRAM(s) Oral daily  QUEtiapine 25 milliGRAM(s) Oral daily  QUEtiapine 25 milliGRAM(s) Oral at bedtime  tamsulosin 0.4 milliGRAM(s) Oral at bedtime     Allergies  No Known Allergies  Intolerances    ROS: Pertinent positives in HPI, all other ROS were reviewed and are negative.      Vital Signs Last 24 Hrs  T(C): 36.2 (06 Feb 2018 09:54), Max: 36.9 (05 Feb 2018 19:48)  T(F): 97.2 (06 Feb 2018 09:54), Max: 98.5 (05 Feb 2018 19:48)  HR: 82 (06 Feb 2018 11:55) (78 - 102)  BP: 138/62 (06 Feb 2018 09:54) (105/60 - 155/64)  BP(mean): --  RR: 18 (06 Feb 2018 09:54) (18 - 18)  SpO2: 96% (06 Feb 2018 09:54) (90% - 96%)    PE:  Constitutional: awake and alert.  HEENT: No masses,   Neck: Hunched posture, limited extension.  Respiratory: Breath sounds are clear bilaterally  Cardiovascular: S1 and S2, regular   Extremities:  no edema  Vascular: Caritid Bruit - no  Musculoskeletal: no joint swelling/tenderness, no tremor  Skin: No rashes    Neurological exam:  HF: Alert, attentive, follows some simple commands, answers in monosyllables, communicates with wife, speech low tone.   CN: JANI, EOMI, tracks eyes well, no NLFD, slight drooling from mouth, tongue midline, Palate movements unable to assess  Motor: Able to raise both UE antigravity, LE - at least 4/5, hypertonia and rigidity; left > right side.    Sens/co-ord: Limited due to pts inability to participate    Reflexes: BJ / BR 1+, KJ 0, AJ 0, downgoing toes b/l  Gait/Balance: Not tested    Labs:   02-06    145  |  115<H>  |  52<H>  ----------------------------<  129<H>  3.6   |  24  |  2.33<H>    Ca    8.6      06 Feb 2018 06:46      01-31 Chol 165  HDL 27<L> Trig 146                          10.4   5.6   )-----------( 131      ( 06 Feb 2018 06:46 )             31.2     Radiology:  - CT Head: < from: CT Head No Cont (02.06.18 @ 08:35) >  Unremarkable head CT.               < end of copied text >

## 2018-02-06 NOTE — CONSULT NOTE ADULT - ASSESSMENT
Patient is a 82y Male with PMH of CKD3 (baseline SCr 1.8 on 2016), HTN, hyperlipidemia, BPH, current smoker sent by PCP Dr. Miller due to hypoxia 67%, hypotension and weakness. Also with low appetite and dysuria and instability when walking. On Admission, found to have +Influenza and ZELDA (B/Cr 51/4.04). Called for renal evaluation.    1. ZELDA on CKD3/4? - last SCr 1.8, peak 4.0, improving today to 3.2  - multifactorial - sepsis, hypotension, hypoperfusion in setting of low po intake, continued smoking and max dose ARB  - agree with holding ARB  - IVF continuous while inadequate po intake  - renal sono with perinephric stranding and ?bladder calculi - check urine culture (already on ceftriaxone, results may be negative) - cont Abx  - urology evaluation   - lytes acceptable - no acidosis or hyperkalemia, no acute indication for dialysis  +proteinuria - quantify    2. HTN - currently hypotensive, hold all BP meds, cont IVF    3. Influenza / Respiratory Failure / Hypoxia  - Tamiflu, oxygen, IV steroids  - for V/Q scan  - LE dopplers    Thank you for consult. Will follow.  Case d/w Dr. Cuevas and RN.
83 y/o gentleman with h/o HTN, BPH, anxiety, COPD, neuropathy, skin Ca lesions s/p removal was admitted on 1/29 from Dr. Miller's office for increased weakness, hypoxia. He had weakness/loss of balance and tremors with decreased oral intake x 4 days PTA.  The office reports pt was found to be  hypoxic to 67% on RA, with HR 31 and BP 80/60, Temp 99F.   EMS reported normal vital signs, but then en route to the hospital pt's blood pressure dropped to 80/40, his O2 sat was fluctuating, and pt was cyanotic. Pt has not been eating much for the last two days, due to poor appetite.  No abd complaints. He reports intermittent burning with urination.  His wife reports he is barely able to walk and has been stumbling. In ER, he received ceftriaxone and doxycycline.     1. Influenza A. Pneumonia ?viral ?underlying bacterial etiology. Likely COPD. ARF. Probable urinary bladder calculi.  -hematuria; no pyuria to suggest urinary infection  -obtain BC x 2, urine c/s  -agree with ceftriaxone 1 gm IV qd, doxycycline 100 mg IV q 12h and oseltamivir 30 mg PO qd  -reason for abx use and side effects reviewed with patient; monitor BMP   -droplet isolation  -monitor temps  -f/u CBC  -supportive care  2. Other issues:   -care per medicine
83 y/o male with PMHx of HTN, BPH, anxiety and smoking was sent to the ER from Dr. Miller's office on 1/29/18 with weakness/loss of balance and tremors for 4 days with decreased oral intake/poor appetite, pt was hypoxic, bradycardic with HR 31 and hypotensive 80/60 and temp 99 F. Pt has been treated for ARF, COPD exac, Sepsis/ viral illness/ Influenza A  and Pneumonia; s/p 4 days IV Solumedrol/AB, was seen by ID.    CT scan ABD/Pelvis revealed layering dense material in the bladder, most likely debri or small calculi due to incomplete bladder emptying.   Patient developed AUR, Schreiber placed, 2 liters   Patient refusing to take tamsulosin, on finasteride, immobilized, constipated    Patient has low platelets, multiple issues presently, not a candidate for cystoscopy at this time    Recommend: Maintain Schreiber to bag, and when patient ambulating, and taking tamsulosin, my attempt catheter removal. Family agrees.  May followup at outpatient for cystoscopy and UDS evaluation
Flu  Hypoxemia  Low BP from above  Acute on chronic renal failure    Suggest:    Flu treatment  O2 supplement  Gentle hydration  Hold Losartan  Monitor renal function, BP  D/W family
Pt is an 83 y/o gentleman with a PMHx of HTN, BPH, anxiety and current smoking who was sent to the ER from Dr. Miller's office.  He was having weakness/loss of balance and tremors for 4 days with decreased oral intake.  The office reports pt was suddenly found to be  hypoxic to 67% on RA, with HR 31 and BP 80/60, Temp 99F.   EMS reported  pt had normal vital signs, but then en route pt's blood pressure dropped to 80/40, his O2 sat was fluctuating, and pt was cyanotic with +cough at baseline.    Pt has not been eating much for the last two days, due to poor appetite.  No abd complaints. He reports intermittent burning with urination.  His wife reports he is barely able to walk and has been stumbling.    Pt denies palpitations , chest pain, SOB, fever, chills, n/v, edema or calf pain.  He has a chronic wet cough and runny nose which family and pt reports is unchanged.    1.Acute COPd exacerbation  2. Acute Influenza causing BRONCHIOLITIS  3. Mucus plugging cont factor to hypoxemia  4/ VQ scan low prob and low clinical suspicion for PE as well makes it unlikely  5. suspected modertae underlying COPD with chronic cough Not prev diagnosed  6. mild CHF maybe cont / renal insufficiency with elevated BUN / creat      plan  Tamiflu  antibiotic coverage  steroids  supplemental O2  outpt PFT  cont pulse ox monitoring cardiac eval  kidney sono eval / fu BUN / creat- get baseline  VQ and ct scan findings noted

## 2018-02-06 NOTE — PROGRESS NOTE ADULT - SUBJECTIVE AND OBJECTIVE BOX
Chief Complaint/Reason for Admission: weakness, low blood pressure and hypoxia	   History of Present Illness: 	  Pt is an 83 y/o gentleman with a PMHx of HTN, BPH, anxiety and current smoking who was sent to the ER from Dr. Miller's office.  He was having weakness/loss of balance and tremors for 4 days PTA with decreased oral intake.  The office reports pt was suddenly found to be  hypoxic to 67% on RA, with HR 31 and BP 80/60, Temp 99F.   EMS reported  pt had normal vital signs, but then en route pt's blood pressure dropped to 80/40, his O2 sat was fluctuating, and pt was cyanotic with +cough at baseline.     Pt received a PNA shot 2 weeks ago.  He does not like to see the doctor. According to the DTRs, he was very functional and verbal prior to this admission. Was able to care for himself and was able to  complete crossword puzzles. No recent travel or sick contacts. No new meds per dtaughters.       2/5: PT very withdrawn and non verbal; in nad; afebrile; no evidence for infection at present; +ve urinary retention nearly 2L this am    2/6: more alert, making eye contact and nodding on queue; recognizing family members; unable to void spontaneously                Hospital Course: Low prob VQ scan for PE, instead for to have Influenza and underwent treatment for superimposed CAP w/ abx. Course complicated by acute delirium requiring 1:1 sitter and IM Haldol.     Sub: Still agitated and delirium, no UTI (on abx), urinating w/o difficulty. Wife at bedside, afebrile.     REVIEW OF SYSTEMS:  as per HPI    ICU Vital Signs Last 24 Hrs  T(C): 36.2 (06 Feb 2018 09:54), Max: 36.9 (05 Feb 2018 19:48)  T(F): 97.2 (06 Feb 2018 09:54), Max: 98.5 (05 Feb 2018 19:48)  HR: 82 (06 Feb 2018 11:55) (78 - 102)  BP: 138/62 (06 Feb 2018 09:54) (105/60 - 155/64)  BP(mean): --  ABP: --  ABP(mean): --  RR: 18 (06 Feb 2018 09:54) (18 - 18)  SpO2: 96% (06 Feb 2018 09:54) (90% - 96%)        Physical Exam:   GENERAL APPEARANCE:  elderly, deconditioned, frail appearing ; anxious but able to nod yes or no  HEENT:  Head is normocephalic    Skin:  dry  NECK:  no JVD  HEART:  Regular rate and rhythm. normal S1 and S2, No M/R/G  LUNGS:  decreased lung sounds /  less rhonchi, no wheezing  ABDOMEN:  Soft, nontender, nondistended with good bowel sounds heard  EXTREMITIES:  no swelling  NEUROLOGICAL:  limited neuro exam    Labs:                           10.5   7.1   )-----------( 118      ( 04 Feb 2018 06:26 )             31.8                       10.7   9.1   )-----------( 119      ( 03 Feb 2018 05:54 )             32.7   02-03    146<H>  |  114<H>  |  64<H>  ----------------------------<  111<H>  4.4   |  22  |  2.35<H>    Ca    9.0      03 Feb 2018 05:54    TPro  6.9  /  Alb  2.9<L>  /  TBili  0.5  /  DBili  x   /  AST  63<H>  /  ALT  47  /  AlkPhos  81  02-02      CT of the chest:   Scattered tree-in-bud opacities along the lungs may represent small   airways disease/infection. No dense consolidation is seen    No evidence of bowel obstruction.    Foci of high density along the urinary bladder posteriorly may represent   multiple layering bladder calculi versus wall calcifications. Correlate   with urologic examination    # Sepsis secondary to Influenza AH3 / CAP  - completed Tamiflu and 4 days IV abx.   - Dupplex LE neg for DVT / low Prob V/Q scan. Off Hep drip.   - hold metoprolol and resume Flomax    # acute on chronic hypoxic respiratory failure secondary to acute COPD excerebration / CAP / Acute Influenza causing BRONCHIOLITIS / Mucus plugging cont factor to hypoxemia  - s/p 4 days IV Solumedrol --> will stop steroids, no need for further use, no wheezing and respiratory status improving.   - Steroids may also contribute to his delirium  - cont nebs  - smoking cessation    # cognitive impairment /  sundowning  # Delirium  / Parkinsonism  - CTH negative for acute pathology  - Nl RPR/TSH/Lyme. Nl ammonia level  - No other infectious process identified. TSH, RPR nl/neg respectively.   - Repeat UA in setting of urine retention; no evidence of infection; s/p 4 days of ceftriaxone; start flomax for urine retention -  - worsened in the setting of steroids. WILL STOP STEROIDS NOW, no wheezing. Cont IM Haldol q4hr PRN and Seroquel QHS 25 --> add AM dose.   - discussed w/ daughters for outpatient dementia workup with neuro. Inpatient work up negative: neg syphillis screen / B12 normal.   - TSH mildly decreased, normal Free T4 --> likely sick euthyroid.     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     Dispo: remain inpatient on telemetry. Case discussed in depth w/ family and staff. Called PCP - left message awaiting call back to further discuss his baseline mentla status.   Total time > 60mins.

## 2018-02-07 LAB
ANION GAP SERPL CALC-SCNC: 9 MMOL/L — SIGNIFICANT CHANGE UP (ref 5–17)
BUN SERPL-MCNC: 41 MG/DL — HIGH (ref 7–23)
CALCIUM SERPL-MCNC: 8.9 MG/DL — SIGNIFICANT CHANGE UP (ref 8.5–10.1)
CHLORIDE SERPL-SCNC: 115 MMOL/L — HIGH (ref 96–108)
CO2 SERPL-SCNC: 25 MMOL/L — SIGNIFICANT CHANGE UP (ref 22–31)
CREAT SERPL-MCNC: 1.76 MG/DL — HIGH (ref 0.5–1.3)
GLUCOSE SERPL-MCNC: 94 MG/DL — SIGNIFICANT CHANGE UP (ref 70–99)
HCT VFR BLD CALC: 31.2 % — LOW (ref 39–50)
HGB BLD-MCNC: 10.2 G/DL — LOW (ref 13–17)
MCHC RBC-ENTMCNC: 29.6 PG — SIGNIFICANT CHANGE UP (ref 27–34)
MCHC RBC-ENTMCNC: 32.5 GM/DL — SIGNIFICANT CHANGE UP (ref 32–36)
MCV RBC AUTO: 91 FL — SIGNIFICANT CHANGE UP (ref 80–100)
PLATELET # BLD AUTO: 137 K/UL — LOW (ref 150–400)
POTASSIUM SERPL-MCNC: 4 MMOL/L — SIGNIFICANT CHANGE UP (ref 3.5–5.3)
POTASSIUM SERPL-SCNC: 4 MMOL/L — SIGNIFICANT CHANGE UP (ref 3.5–5.3)
RBC # BLD: 3.43 M/UL — LOW (ref 4.2–5.8)
RBC # FLD: 12.9 % — SIGNIFICANT CHANGE UP (ref 10.3–14.5)
SODIUM SERPL-SCNC: 149 MMOL/L — HIGH (ref 135–145)
WBC # BLD: 6.2 K/UL — SIGNIFICANT CHANGE UP (ref 3.8–10.5)
WBC # FLD AUTO: 6.2 K/UL — SIGNIFICANT CHANGE UP (ref 3.8–10.5)

## 2018-02-07 RX ORDER — SODIUM CHLORIDE 9 MG/ML
1000 INJECTION, SOLUTION INTRAVENOUS
Qty: 0 | Refills: 0 | Status: DISCONTINUED | OUTPATIENT
Start: 2018-02-07 | End: 2018-02-07

## 2018-02-07 RX ORDER — SODIUM CHLORIDE 9 MG/ML
1000 INJECTION, SOLUTION INTRAVENOUS
Qty: 0 | Refills: 0 | Status: DISCONTINUED | OUTPATIENT
Start: 2018-02-07 | End: 2018-02-08

## 2018-02-07 RX ORDER — METOPROLOL TARTRATE 50 MG
25 TABLET ORAL
Qty: 0 | Refills: 0 | Status: DISCONTINUED | OUTPATIENT
Start: 2018-02-07 | End: 2018-02-08

## 2018-02-07 RX ADMIN — Medication 1 PATCH: at 04:56

## 2018-02-07 RX ADMIN — SODIUM CHLORIDE 75 MILLILITER(S): 9 INJECTION, SOLUTION INTRAVENOUS at 12:30

## 2018-02-07 RX ADMIN — Medication 3 MILLILITER(S): at 16:20

## 2018-02-07 RX ADMIN — Medication 3 MILLILITER(S): at 07:53

## 2018-02-07 RX ADMIN — Medication 3 MILLILITER(S): at 11:28

## 2018-02-07 NOTE — PROGRESS NOTE ADULT - SUBJECTIVE AND OBJECTIVE BOX
Patient is a 82y Male with PMH of CKD3 (baseline SCr 1.8 on 2016), HTN, hyperlipidemia, BPH, current smoker sent by PCP Dr. Miller due to hypoxia 67%, hypotension and weakness. Also with low appetite and dysuria and instability when walking. On Admission, found to have +Influenza and ZELDA (B/Cr 51/4.04). Called for renal evaluation.    Denies h/o kidney disease. Does not see outside nephrologist. Minimal po intake. +dysuria, no hematuria, no h/o stones per patient. Takes max dose ARB as outpatient.    Receiving IVF, supplemental oxygen. Also receiving Tamiflu, IV steroids and Ceftriaxone. s/p V/Q scan - negative for PE.   - lethargic, SNa improving on d5W, minimal po intake   - +urinary retention s/p romero placement, improved renal function, off IVF    REVIEW OF SYSTEMS:    UTO confusion/lethargy.    MEDICATIONS  (STANDING):  ALBUTerol/ipratropium for Nebulization 3 milliLiter(s) Nebulizer every 4 hours  aspirin  chewable 81 milliGRAM(s) Oral daily  atorvastatin 40 milliGRAM(s) Oral at bedtime  bethanechol 10 milliGRAM(s) Oral three times a day  buDESOnide 160 MICROgram(s)/formoterol 4.5 MICROgram(s) Inhaler 2 Puff(s) Inhalation two times a day  dextrose 5%. 1000 milliLiter(s) (50 mL/Hr) IV Continuous <Continuous>  finasteride 5 milliGRAM(s) Oral daily  heparin  Injectable 5000 Unit(s) SubCutaneous every 12 hours  metoprolol     tartrate 25 milliGRAM(s) Oral two times a day  nicotine -  14 mG/24Hr(s) Patch 1 patch Transdermal daily  QUEtiapine 25 milliGRAM(s) Oral daily  QUEtiapine 25 milliGRAM(s) Oral at bedtime  tamsulosin 0.4 milliGRAM(s) Oral at bedtime      Vital Signs Last 24 Hrs  T(C): 36.8 (2018 04:33), Max: 36.8 (2018 04:33)  T(F): 98.3 (2018 04:33), Max: 98.3 (2018 04:33)  HR: 92 (2018 04:33) (82 - 92)  BP: 170/78 (2018 04:33) (142/51 - 170/78)  BP(mean): 98 (2018 04:33) (98 - 98)  RR: 17 (2018 04:33) (17 - 18)  SpO2: 98% (2018 04:33) (97% - 98%)  Daily     Daily   I&O's Summary    2018 07:01  -  2018 07:00  --------------------------------------------------------  IN: 0 mL / OUT: 1600 mL / NET: -1600 mL    2018 07:01  -  2018 07:00  --------------------------------------------------------  IN: 1370 mL / OUT: 0 mL / NET: 1370 mL            PHYSICAL EXAM:    Constitutional: frail, lethargic, awake  HEENT: PERRLA, EOMI,  MMM  Neck: No LAD, No JVD  Respiratory: scatt rhonchi  Cardiovascular: S1 and S2, RRR  Gastrointestinal: BS+, no abdominal distention or tenderness/pain to palpation  Extremities: No peripheral edema  Neurological: lethargic but more awake today  : +foely with medium yellow urine in romero bag  Skin: No rashes  Access: Not applicable        LABS:                        10.5   7.1   )-----------( 118      ( 2018 06:26 )             31.8     02-07    149<H>  |  115<H>  |  41<H>  ----------------------------<  94  4.0   |  25  |  1.76<H>    Ca    8.9      2018 06:26  02-05    145  |  114<H>  |  56<H>  ----------------------------<  168<H>  3.5   |  21<L>  |  2.79<H>    Ca    8.7      2018 06:25      2018 06:26    147    |  117    |  58     ----------------------------<  92     3.7     |  21     |  1.97   2018 05:54    146    |  114    |  64     ----------------------------<  111    4.4     |  22     |  2.35   2018 13:06    144    |  113    |  60     ----------------------------<  128    4.3     |  25     |  2.40   2018 12:04    144    |  113    |  63     ----------------------------<  174    4.4     |  24     |  2.94     Ca    8.5        2018 06:26  Ca    9.0        2018 05:54  Ca    9.1        2018 13:06  Ca    8.5        2018 12:04    TPro  6.9    /  Alb  2.9    /  TBili  0.5    /  DBili  x      /  AST  63     /  ALT  47     /  AlkPhos  81     2018 13:06          Urine Studies:  Urinalysis Basic - ( 2018 19:25 )    Color: Yellow / Appearance: Slightly Turbid / S.015 / pH: x  Gluc: x / Ketone: Negative  / Bili: Negative / Urobili: Negative mg/dL   Blood: x / Protein: 30 mg/dL / Nitrite: Negative   Leuk Esterase: Negative / RBC: >50 /HPF / WBC 3-5   Sq Epi: x / Non Sq Epi: Few / Bacteria: Many        RADIOLOGY & ADDITIONAL STUDIES:

## 2018-02-07 NOTE — PROGRESS NOTE ADULT - SUBJECTIVE AND OBJECTIVE BOX
Chief Complaint/Reason for Admission: weakness, low blood pressure and hypoxia	   History of Present Illness: 	  Pt is an 83 y/o gentleman with a PMHx of HTN, BPH, anxiety and current smoking who was sent to the ER from Dr. Miller's office.  He was having weakness/loss of balance and tremors for 4 days PTA with decreased oral intake.  The office reports pt was suddenly found to be  hypoxic to 67% on RA, with HR 31 and BP 80/60, Temp 99F.   EMS reported  pt had normal vital signs, but then en route pt's blood pressure dropped to 80/40, his O2 sat was fluctuating, and pt was cyanotic with +cough at baseline.     Pt received a PNA shot 2 weeks ago.  He does not like to see the doctor. According to the DTRs, he was very functional and verbal prior to this admission. Was able to care for himself and was able to  complete crossword puzzles. No recent travel or sick contacts. No new meds per dtaughters.       2/5: PT very withdrawn and non verbal; in nad; afebrile; no evidence for infection at present; +ve urinary retention nearly 2L this am    2/6: more alert, making eye contact and nodding on queue; recognizing family members; unable to void spontaneously    2/7: communicating more readily today and making eye contact. improving daily. Scr improving                Hospital Course: Low prob VQ scan for PE, instead for to have Influenza and underwent treatment for superimposed CAP w/ abx. Course complicated by acute delirium requiring 1:1 sitter and IM Haldol.     Sub: Still agitated and delirium, no UTI (on abx), urinating w/o difficulty. Wife at bedside, afebrile.     REVIEW OF SYSTEMS:  as per HPI    ICU Vital Signs Last 24 Hrs  T(C): 36.9 (07 Feb 2018 10:20), Max: 36.9 (07 Feb 2018 10:20)  T(F): 98.4 (07 Feb 2018 10:20), Max: 98.4 (07 Feb 2018 10:20)  HR: 83 (07 Feb 2018 10:20) (78 - 92)  BP: 159/77 (07 Feb 2018 10:20) (142/51 - 170/78)  BP(mean): 98 (07 Feb 2018 04:33) (98 - 98)  ABP: --  ABP(mean): --  RR: 18 (07 Feb 2018 10:20) (17 - 18)  SpO2: 94% (07 Feb 2018 10:20) (94% - 98%)          Physical Exam:   GENERAL APPEARANCE:  elderly, deconditioned, frail appearing ; anxious but able to nod yes or no  HEENT:  Head is normocephalic    Skin:  dry  NECK:  no JVD  HEART:  Regular rate and rhythm. normal S1 and S2, No M/R/G  LUNGS:  decreased lung sounds /  less rhonchi, no wheezing  ABDOMEN:  Soft, nontender, nondistended with good bowel sounds heard  EXTREMITIES:  no swelling  NEUROLOGICAL:  limited neuro exam    Labs:                           10.5   7.1   )-----------( 118      ( 04 Feb 2018 06:26 )             31.8                       10.7   9.1   )-----------( 119      ( 03 Feb 2018 05:54 )             32.7   02-03    146<H>  |  114<H>  |  64<H>  ----------------------------<  111<H>  4.4   |  22  |  2.35<H>    Ca    9.0      03 Feb 2018 05:54    TPro  6.9  /  Alb  2.9<L>  /  TBili  0.5  /  DBili  x   /  AST  63<H>  /  ALT  47  /  AlkPhos  81  02-02      CT of the chest:   Scattered tree-in-bud opacities along the lungs may represent small   airways disease/infection. No dense consolidation is seen    No evidence of bowel obstruction.    Foci of high density along the urinary bladder posteriorly may represent   multiple layering bladder calculi versus wall calcifications. Correlate   with urologic examination    # Sepsis secondary to Influenza AH3 / CAP  - completed Tamiflu and 4 days IV abx.   - Duplex LE neg for DVT / low Prob V/Q scan. Off Hep drip.   - resume metoprolol and resume Flomax    # acute on chronic hypoxic respiratory failure secondary to acute COPD excerebration / CAP / Acute Influenza causing BRONCHIOLITIS / Mucus plugging cont factor to hypoxemia  - s/p 4 days IV Solumedrol --> will stop steroids, no need for further use, no wheezing and respiratory status improving.   - Steroids may also contribute to his delirium  - cont nebs  - smoking cessation    # cognitive impairment /  sundowning  # Delirium / Toxic metabolic encephalopathy related to above  # Motor and gait instability  - CTH negative for acute pathology  - Nl RPR/TSH/Lyme. Nl ammonia level  - No other infectious process identified. TSH, RPR nl/neg respectively.   - Repeat UA in setting of urine retention; no evidence of infection; s/p 4 days of ceftriaxone; start flomax for urine retention -  - worsened in the setting of steroids. WILL STOP STEROIDS NOW, no wheezing. Cont IM Haldol q4hr PRN and Seroquel QHS 25 --> add AM dose.   - discussed w/ daughters for outpatient dementia workup with neuro. Inpatient work up negative: neg syphillis screen / B12 normal.   - TSH mildly decreased, normal Free T4 --> likely sick euthyroid.   - Needs aggressive physical therapy  - Outpt neurology follow up. 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     Dispo: Case discussed in depth w/ family and staff. Called PCP - left message awaiting call back to further discuss his baseline mental status.   Total time > 60mins. Plan DC to [facility] in 24 hours when bed available

## 2018-02-08 LAB
ADD ON TEST-SPECIMEN IN LAB: SIGNIFICANT CHANGE UP
ANION GAP SERPL CALC-SCNC: 6 MMOL/L — SIGNIFICANT CHANGE UP (ref 5–17)
BUN SERPL-MCNC: 37 MG/DL — HIGH (ref 7–23)
CALCIUM SERPL-MCNC: 9.1 MG/DL — SIGNIFICANT CHANGE UP (ref 8.5–10.1)
CHLORIDE SERPL-SCNC: 116 MMOL/L — HIGH (ref 96–108)
CO2 SERPL-SCNC: 27 MMOL/L — SIGNIFICANT CHANGE UP (ref 22–31)
CREAT SERPL-MCNC: 1.69 MG/DL — HIGH (ref 0.5–1.3)
GLUCOSE SERPL-MCNC: 116 MG/DL — HIGH (ref 70–99)
HCT VFR BLD CALC: 31.2 % — LOW (ref 39–50)
HGB BLD-MCNC: 10.1 G/DL — LOW (ref 13–17)
MAGNESIUM SERPL-MCNC: 1.7 MG/DL — SIGNIFICANT CHANGE UP (ref 1.6–2.6)
MCHC RBC-ENTMCNC: 29.5 PG — SIGNIFICANT CHANGE UP (ref 27–34)
MCHC RBC-ENTMCNC: 32.3 GM/DL — SIGNIFICANT CHANGE UP (ref 32–36)
MCV RBC AUTO: 91.4 FL — SIGNIFICANT CHANGE UP (ref 80–100)
PHOSPHATE SERPL-MCNC: 2.8 MG/DL — SIGNIFICANT CHANGE UP (ref 2.5–4.5)
PLATELET # BLD AUTO: 140 K/UL — LOW (ref 150–400)
POTASSIUM SERPL-MCNC: 3.9 MMOL/L — SIGNIFICANT CHANGE UP (ref 3.5–5.3)
POTASSIUM SERPL-SCNC: 3.9 MMOL/L — SIGNIFICANT CHANGE UP (ref 3.5–5.3)
RBC # BLD: 3.42 M/UL — LOW (ref 4.2–5.8)
RBC # FLD: 13.3 % — SIGNIFICANT CHANGE UP (ref 10.3–14.5)
SODIUM SERPL-SCNC: 149 MMOL/L — HIGH (ref 135–145)
WBC # BLD: 7.7 K/UL — SIGNIFICANT CHANGE UP (ref 3.8–10.5)
WBC # FLD AUTO: 7.7 K/UL — SIGNIFICANT CHANGE UP (ref 3.8–10.5)

## 2018-02-08 RX ORDER — FLUCONAZOLE 150 MG/1
100 TABLET ORAL DAILY
Qty: 0 | Refills: 0 | Status: DISCONTINUED | OUTPATIENT
Start: 2018-02-09 | End: 2018-02-09

## 2018-02-08 RX ORDER — AMLODIPINE BESYLATE 2.5 MG/1
2.5 TABLET ORAL DAILY
Qty: 0 | Refills: 0 | Status: DISCONTINUED | OUTPATIENT
Start: 2018-02-08 | End: 2018-02-09

## 2018-02-08 RX ORDER — FLUCONAZOLE 150 MG/1
200 TABLET ORAL ONCE
Qty: 0 | Refills: 0 | Status: COMPLETED | OUTPATIENT
Start: 2018-02-08 | End: 2018-02-08

## 2018-02-08 RX ORDER — SODIUM CHLORIDE 9 MG/ML
1000 INJECTION, SOLUTION INTRAVENOUS
Qty: 0 | Refills: 0 | Status: DISCONTINUED | OUTPATIENT
Start: 2018-02-08 | End: 2018-02-09

## 2018-02-08 RX ORDER — METOPROLOL TARTRATE 50 MG
50 TABLET ORAL
Qty: 0 | Refills: 0 | Status: DISCONTINUED | OUTPATIENT
Start: 2018-02-08 | End: 2018-02-09

## 2018-02-08 RX ADMIN — AMLODIPINE BESYLATE 2.5 MILLIGRAM(S): 2.5 TABLET ORAL at 16:47

## 2018-02-08 RX ADMIN — HEPARIN SODIUM 5000 UNIT(S): 5000 INJECTION INTRAVENOUS; SUBCUTANEOUS at 20:56

## 2018-02-08 RX ADMIN — ATORVASTATIN CALCIUM 40 MILLIGRAM(S): 80 TABLET, FILM COATED ORAL at 20:56

## 2018-02-08 RX ADMIN — BUDESONIDE AND FORMOTEROL FUMARATE DIHYDRATE 2 PUFF(S): 160; 4.5 AEROSOL RESPIRATORY (INHALATION) at 09:06

## 2018-02-08 RX ADMIN — Medication 3 MILLILITER(S): at 09:05

## 2018-02-08 RX ADMIN — Medication 3 MILLILITER(S): at 00:27

## 2018-02-08 RX ADMIN — QUETIAPINE FUMARATE 25 MILLIGRAM(S): 200 TABLET, FILM COATED ORAL at 20:55

## 2018-02-08 RX ADMIN — Medication 3 MILLILITER(S): at 00:26

## 2018-02-08 RX ADMIN — FLUCONAZOLE 200 MILLIGRAM(S): 150 TABLET ORAL at 20:55

## 2018-02-08 RX ADMIN — SODIUM CHLORIDE 75 MILLILITER(S): 9 INJECTION, SOLUTION INTRAVENOUS at 06:48

## 2018-02-08 RX ADMIN — Medication 1 PATCH: at 13:51

## 2018-02-08 RX ADMIN — Medication 50 MILLIGRAM(S): at 16:46

## 2018-02-08 RX ADMIN — SODIUM CHLORIDE 100 MILLILITER(S): 9 INJECTION, SOLUTION INTRAVENOUS at 20:19

## 2018-02-08 RX ADMIN — HEPARIN SODIUM 5000 UNIT(S): 5000 INJECTION INTRAVENOUS; SUBCUTANEOUS at 13:50

## 2018-02-08 RX ADMIN — TAMSULOSIN HYDROCHLORIDE 0.4 MILLIGRAM(S): 0.4 CAPSULE ORAL at 20:55

## 2018-02-08 RX ADMIN — Medication 10 MILLIGRAM(S): at 20:55

## 2018-02-08 NOTE — PROGRESS NOTE ADULT - SUBJECTIVE AND OBJECTIVE BOX
Patient is a 82y Male with PMH of CKD3 (baseline SCr 1.8 on 2016), HTN, hyperlipidemia, BPH, current smoker sent by PCP Dr. Miller due to hypoxia 67%, hypotension and weakness. Also with low appetite and dysuria and instability when walking. On Admission, found to have +Influenza and ZELDA (B/Cr 51/4.04). Called for renal evaluation.    Denies h/o kidney disease. Does not see outside nephrologist. Minimal po intake. +dysuria, no hematuria, no h/o stones per patient. Takes max dose ARB as outpatient.    Receiving IVF, supplemental oxygen. Also receiving Tamiflu, IV steroids and Ceftriaxone. s/p V/Q scan - negative for PE.   - lethargic, SNa improving on d5W, minimal po intake   - +urinary retention s/p romero placement, improved renal function, off IVF   - renal function continues to improve, +hypernatremia on D5W, minimal po fluid / solid intake    REVIEW OF SYSTEMS:    UTO confusion/lethargy.    MEDICATIONS  (STANDING):  ALBUTerol/ipratropium for Nebulization 3 milliLiter(s) Nebulizer every 4 hours  aspirin  chewable 81 milliGRAM(s) Oral daily  atorvastatin 40 milliGRAM(s) Oral at bedtime  bethanechol 10 milliGRAM(s) Oral three times a day  buDESOnide 160 MICROgram(s)/formoterol 4.5 MICROgram(s) Inhaler 2 Puff(s) Inhalation two times a day  dextrose 5%. 1000 milliLiter(s) (75 mL/Hr) IV Continuous <Continuous>  finasteride 5 milliGRAM(s) Oral daily  heparin  Injectable 5000 Unit(s) SubCutaneous every 12 hours  metoprolol     tartrate 50 milliGRAM(s) Oral two times a day  nicotine -  14 mG/24Hr(s) Patch 1 patch Transdermal daily  QUEtiapine 25 milliGRAM(s) Oral daily  QUEtiapine 25 milliGRAM(s) Oral at bedtime  tamsulosin 0.4 milliGRAM(s) Oral at bedtime      Vital Signs Last 24 Hrs  T(C): 36.8 (2018 05:29), Max: 36.8 (2018 15:57)  T(F): 98.3 (2018 05:29), Max: 98.3 (2018 15:57)  HR: 80 (2018 08:45) (80 - 90)  BP: 172/86 (2018 05:29) (137/68 - 172/86)  BP(mean): --  RR: 18 (2018 05:29) (18 - 18)  SpO2: 80% (2018 08:45) (80% - 96%)  Daily     Daily Weight in k.6 (2018 13:07)  I&O's Summary    2018 07:01  -  2018 07:00  --------------------------------------------------------  IN: 0 mL / OUT: 1400 mL / NET: -1400 mL    2018 07:01  -  2018 07:00  --------------------------------------------------------  IN: 0 mL / OUT: 1600 mL / NET: -1600 mL    2018 07:01  -  2018 07:00  --------------------------------------------------------  IN: 1370 mL / OUT: 0 mL / NET: 1370 mL            PHYSICAL EXAM:    Constitutional: frail, more awake today, verbal  HEENT: PERRLA, EOMI,  MMM  Neck: No LAD, No JVD  Respiratory: CTAB, now wheeze  Cardiovascular: S1 and S2, RRR  Gastrointestinal: BS+, no abdominal distention or tenderness/pain to palpation  Extremities: No peripheral edema  Neurological: awake, responsive  : +romero with cloudy medium yellow urine in romero bag  Skin: No rashes  Access: Not applicable        LABS:                        10.5   7.1   )-----------( 118      ( 2018 06:26 )             31.8       149    |  116    |  37     ----------------------------<  116       2018 06:40  3.9     |  27     |  1.69     149    |  115    |  41     ----------------------------<  94        2018 06:26  4.0     |  25     |  1.76     145    |  115    |  52     ----------------------------<  129       2018 06:46  3.6     |  24     |  2.33     Ca    9.1        2018 06:40  Ca    8.9        2018 06:26    Phos  2.8       2018 06:40    Mg     1.7       2018 06:40      02-05    145  |  114<H>  |  56<H>  ----------------------------<  168<H>  3.5   |  21<L>  |  2.79<H>    Ca    8.7      2018 06:25      2018 06:26    147    |  117    |  58     ----------------------------<  92     3.7     |  21     |  1.97   2018 05:54    146    |  114    |  64     ----------------------------<  111    4.4     |  22     |  2.35   2018 13:06    144    |  113    |  60     ----------------------------<  128    4.3     |  25     |  2.40   2018 12:04    144    |  113    |  63     ----------------------------<  174    4.4     |  24     |  2.94     Ca    8.5        2018 06:26  Ca    9.0        2018 05:54  Ca    9.1        2018 13:06  Ca    8.5        2018 12:04    TPro  6.9    /  Alb  2.9    /  TBili  0.5    /  DBili  x      /  AST  63     /  ALT  47     /  AlkPhos  81     2018 13:06          Urine Studies:  Urinalysis Basic - ( 2018 19:25 )    Color: Yellow / Appearance: Slightly Turbid / S.015 / pH: x  Gluc: x / Ketone: Negative  / Bili: Negative / Urobili: Negative mg/dL   Blood: x / Protein: 30 mg/dL / Nitrite: Negative   Leuk Esterase: Negative / RBC: >50 /HPF / WBC 3-5   Sq Epi: x / Non Sq Epi: Few / Bacteria: Many        RADIOLOGY & ADDITIONAL STUDIES:

## 2018-02-08 NOTE — PROGRESS NOTE ADULT - SUBJECTIVE AND OBJECTIVE BOX
Chief Complaint/Reason for Admission: weakness, low blood pressure and hypoxia	   History of Present Illness: 	  Pt is an 83 y/o gentleman with a PMHx of HTN, BPH, anxiety and current smoking who was sent to the ER from Dr. Miller's office.  He was having weakness/loss of balance and tremors for 4 days PTA with decreased oral intake.  The office reports pt was suddenly found to be  hypoxic to 67% on RA, with HR 31 and BP 80/60, Temp 99F.   EMS reported  pt had normal vital signs, but then en route pt's blood pressure dropped to 80/40, his O2 sat was fluctuating, and pt was cyanotic with +cough at baseline.     Pt received a PNA shot 2 weeks ago.  He does not like to see the doctor. According to the DTRs, he was very functional and verbal prior to this admission. Was able to care for himself and was able to  complete crossword puzzles. No recent travel or sick contacts. No new meds per dtaughters.       2/5: PT very withdrawn and non verbal; in nad; afebrile; no evidence for infection at present; +ve urinary retention nearly 2L this am    2/6: more alert, making eye contact and nodding on queue; recognizing family members; unable to void spontaneously    2/7: communicating more readily today and making eye contact. improving daily. Scr improving    2/8: status quo; off 1:1 supervision;                 Hospital Course: Low prob VQ scan for PE, instead for to have Influenza and underwent treatment for superimposed CAP w/ abx. Course complicated by acute delirium requiring 1:1 sitter and IM Haldol.     Sub: Still agitated and delirium, no UTI (on abx), urinating w/o difficulty. Wife at bedside, afebrile.     REVIEW OF SYSTEMS:  as per HPI  ICU Vital Signs Last 24 Hrs  T(C): 36.8 (08 Feb 2018 05:29), Max: 36.8 (07 Feb 2018 15:57)  T(F): 98.3 (08 Feb 2018 05:29), Max: 98.3 (07 Feb 2018 15:57)  HR: 80 (08 Feb 2018 08:45) (80 - 90)  BP: 172/86 (08 Feb 2018 05:29) (137/68 - 172/86)  BP(mean): --  ABP: --  ABP(mean): --  RR: 18 (08 Feb 2018 05:29) (18 - 18)  SpO2: 80% (08 Feb 2018 08:45) (80% - 96%)            Physical Exam:   GENERAL APPEARANCE:  elderly, deconditioned, frail appearing ; anxious but able to nod yes or no  HEENT:  Head is normocephalic    Skin:  dry  NECK:  no JVD  HEART:  Regular rate and rhythm. normal S1 and S2, No M/R/G  LUNGS:  decreased lung sounds /  less rhonchi, no wheezing  ABDOMEN:  Soft, nontender, nondistended with good bowel sounds heard  EXTREMITIES:  no swelling  NEUROLOGICAL:  limited neuro exam    Labs:                           10.5   7.1   )-----------( 118      ( 04 Feb 2018 06:26 )             31.8                       10.7   9.1   )-----------( 119      ( 03 Feb 2018 05:54 )             32.7   02-03    146<H>  |  114<H>  |  64<H>  ----------------------------<  111<H>  4.4   |  22  |  2.35<H>    Ca    9.0      03 Feb 2018 05:54    TPro  6.9  /  Alb  2.9<L>  /  TBili  0.5  /  DBili  x   /  AST  63<H>  /  ALT  47  /  AlkPhos  81  02-02      CT of the chest:   Scattered tree-in-bud opacities along the lungs may represent small   airways disease/infection. No dense consolidation is seen    No evidence of bowel obstruction.    Foci of high density along the urinary bladder posteriorly may represent   multiple layering bladder calculi versus wall calcifications. Correlate   with urologic examination    # Sepsis secondary to Influenza AH3 / CAP  - completed Tamiflu and 4 days IV abx.   - Duplex LE neg for DVT / low Prob V/Q scan. Off Hep drip.   - resume metoprolol and resume Flomax    # acute on chronic hypoxic respiratory failure secondary to acute COPD excerebration / CAP / Acute Influenza causing BRONCHIOLITIS / Mucus plugging cont factor to hypoxemia  - s/p 4 days IV Solumedrol --> will stop steroids, no need for further use, no wheezing and respiratory status improving.   - Steroids may also contribute to his delirium  - cont nebs  - smoking cessation    # cognitive impairment /  sundowning  # Delirium / Toxic metabolic encephalopathy related to above  # Motor and gait instability  - CTH negative for acute pathology  - Nl RPR/TSH/Lyme. Nl ammonia level  - No other infectious process identified. TSH, RPR nl/neg respectively.   - Repeat UA in setting of urine retention; no evidence of infection; s/p 4 days of ceftriaxone; start flomax for urine retention -  - worsened in the setting of steroids. WILL STOP STEROIDS NOW, no wheezing. Cont IM Haldol q4hr PRN and Seroquel QHS 25 --> add AM dose.   - discussed w/ daughters for outpatient dementia workup with neuro. Inpatient work up negative: neg syphillis screen / B12 normal.   - TSH mildly decreased, normal Free T4 --> likely sick euthyroid.   - Needs aggressive physical therapy  - Outpt neurology follow up. D/W Dr rodriguez  - Family given realistic expectations on his prognosis and post rehab care; realistically he may need LT placement which they are not ready to do; therefore they may have to look into private hire in the home setting. It in unsure how much of his motor and gait instability will regress but with the proper rehabilitation, I am hopeful he will regain some of his cognitive and motor skills once again    # Elevated troponin secondary to demand ischemia secondary to flue  - normalized  - started on ASA/STATIN    # ARF 2/2 to post obstructive nephropathy / enlarged prostate  - encourage po intake of water  - hold losartan  - US and CT bladder: no evidence of obstructive hydro/mass/stone  - CT c/w above: start flomax, bethanecol, proscar and insert romero; dc with romero; UDS outpt      # Thrombocytopenia, anemia, elevated ESR -  Likely consumptive from sepsis  now resolving- no evidence of bleeding    # DVT prophylaxis-  venodynes  - start subQ Heparin.     Dispo: Case discussed in depth w/ family and staff. Called PCP - left message awaiting call back to further discuss his baseline mental status.   Total time > 60mins. Plan DC to [facility] in 24 hours when bed available

## 2018-02-08 NOTE — PROGRESS NOTE ADULT - PROVIDER SPECIALTY LIST ADULT
Hospitalist
Infectious Disease
Nephrology
Neurology
Pulmonology
Nephrology
Cardiology

## 2018-02-08 NOTE — PROGRESS NOTE ADULT - ASSESSMENT
82y Male with PMH of CKD3 (baseline SCr 1.8 on 2016), HTN, hyperlipidemia, BPH here with  hypoxia found to have +Influenza and ZELDA (B/Cr 51/4.04)    1. ZELDA on CKD  -Renal function stabilizing  -Keep IVF if intake limited  -Hopeful to approach baseline function next 24-48 hours    2. Hypernatremia  -Slight increment in Na despite hypotonic IVF  -Encourage po intake  -May need conversion to D5 if not improving    3. Influenza /  Hypoxia  - Medicine/ID    d/c with daughter  d/c with staff
Flu  Hypoxemia  Low BP from above  Acute on chronic renal failure, improving    Suggest:    Flu treatment  O2 supplement  Gentle hydration  Hold Losartan  Monitor renal function, BP  VQ scan is low probability for PE  No need for anticoagulation  I shall f/u PRN now. - out pt follow up with me.  D/C tele  D/W family
81 y/o gentleman with h/o HTN, BPH, anxiety, COPD, neuropathy, skin Ca lesions s/p removal was admitted on 1/29 from Dr. Miller's office for increased weakness, hypoxia. He had weakness/loss of balance and tremors with decreased oral intake x 4 days PTA.  The office reports pt was found to be  hypoxic to 67% on RA, with HR 31 and BP 80/60, Temp 99F.   EMS reported normal vital signs, but then en route to the hospital pt's blood pressure dropped to 80/40, his O2 sat was fluctuating, and pt was cyanotic. Pt has not been eating much for the last two days, due to poor appetite.  No abd complaints. He reports intermittent burning with urination.  His wife reports he is barely able to walk and has been stumbling. In ER, he received ceftriaxone and doxycycline.     1. Influenza A improving. Pneumonia ?viral ?underlying bacterial etiology. Likely COPD. ARF. Probable urinary bladder calculi.  -improved, but confused  -renal function is improving  -f/u BC x 2  -s/p ceftriaxone 1 gm IV qd, doxycycline 100 mg IV q 12h   -on oseltamivir 30 mg PO qd # 5-6  -tolerating abx well so far; no side effects noted  -complete antiviral therapy  -monitor temps  -f/u CBC  -supportive care  2. Other issues:   -care per medicine
81 y/o gentleman with h/o HTN, BPH, anxiety, COPD, neuropathy, skin Ca lesions s/p removal was admitted on 1/29 from Dr. Miller's office for increased weakness, hypoxia. He had weakness/loss of balance and tremors with decreased oral intake x 4 days PTA.  The office reports pt was found to be  hypoxic to 67% on RA, with HR 31 and BP 80/60, Temp 99F.   EMS reported normal vital signs, but then en route to the hospital pt's blood pressure dropped to 80/40, his O2 sat was fluctuating, and pt was cyanotic. Pt has not been eating much for the last two days, due to poor appetite.  No abd complaints. He reports intermittent burning with urination.  His wife reports he is barely able to walk and has been stumbling. In ER, he received ceftriaxone and doxycycline.     1. Influenza A. Pneumonia ?viral ?underlying bacterial etiology. Likely COPD. ARF. Probable urinary bladder calculi.  -hemodinamically improved  -f/u BC x 2, urine c/s  -onceftriaxone 1 gm IV qd, doxycycline 100 mg IV q 12h and oseltamivir 30 mg PO qd # 2  -tolerating abx well so far; no side effects noted  -continue abx coverage  -droplet isolation  -monitor temps  -f/u CBC  -supportive care  2. Other issues:   -care per medicine
82y Male with PMH of CKD3 (baseline SCr 1.8 on 2016), HTN, hyperlipidemia, BPH here with hypoxia found to have +Influenza and ZELDA (B/Cr 51/4.04)    1. ZELDA on CKD  - +urinary retention now s/p romero placement with nonoliguric UOP, SCr improving  - +hypernatremia due to free water loss with relief of obstruction --> needs D5W - restart at 75ml/h  - urology input appreciated  - lytes acceptable    2. Hypernatremia  -worsening due to post-obstructive diuresis --> restart D5W    3. Influenza /  Hypoxia  - Medicine/ID
82y Male with PMH of CKD3 (baseline SCr 1.8 on 2016), HTN, hyperlipidemia, BPH here with hypoxia found to have +Influenza and ZELDA (B/Cr 51/4.04)    1. ZELDA on CKD  - +urinary retention now s/p romero placement with nonoliguric UOP, SCr improving  - +hypernatremia due to free water loss with relief of obstruction --> needs D5W while low po intake - increase to 100ml/h  - urology input appreciated  - lytes acceptable    2. Hypernatremia  -worsening due to post-obstructive diuresis / low po intake - increase D5W    3. Influenza /  Hypoxia  - Medicine/ID    4. HTN - suboptimal  - start Amlodipine 2.5mg - titrate up as needed      Dr. Treviño to cover service 2/10-11.
82y Male with PMH of CKD3 (baseline SCr 1.8 on 2016), HTN, hyperlipidemia, BPH here with hypoxia found to have +Influenza and ZELDA (B/Cr 51/4.04)    1. ZELDA on CKD  -Renal function stabilizing  -Keep IVF with limited intake    2. Hypernatremia  -Slight increment in Na despite hypotonic IVF  -change to D5W at low rate to improve to goal values    3. Influenza /  Hypoxia  - Medicine/ID    d/c with daughters  d/c with staff  d/c with Dr. Gallo
82y Male with PMH of CKD3 (baseline SCr 1.8 on 2016), HTN, hyperlipidemia, BPH here with hypoxia found to have +Influenza and ZELDA (B/Cr 51/4.04)    1. ZELDA on CKD  -Renal function stabilizing near 2, today increased to 2.8  +abdominal distention and tenderness -- needs bladder scan to evaluate for urinary retention / possible romero placement  -Keep IVF with limited intake  - lytes acceptable    2. Hypernatremia  -improving on D5W due to low po intake    3. Influenza /  Hypoxia  - Medicine/ID      Case d/w RN
83 y/o gentleman with h/o HTN, BPH, anxiety, COPD, neuropathy, skin Ca lesions s/p removal was admitted on 1/29 from Dr. Miller's office for increased weakness, hypoxia. He had weakness/loss of balance and tremors with decreased oral intake x 4 days PTA.  The office reports pt was found to be  hypoxic to 67% on RA, with HR 31 and BP 80/60, Temp 99F.   EMS reported normal vital signs, but then en route to the hospital pt's blood pressure dropped to 80/40, his O2 sat was fluctuating, and pt was cyanotic. Pt has not been eating much for the last two days, due to poor appetite.  No abd complaints. He reports intermittent burning with urination.  His wife reports he is barely able to walk and has been stumbling. In ER, he received ceftriaxone and doxycycline.     1. Influenza A. Pneumonia ?viral ?underlying bacterial etiology. Likely COPD. ARF. Probable urinary bladder calculi.  -improved, but confused  -episode of delirium last night  -renal function is improving  -f/u BC x 2  -on ceftriaxone 1 gm IV qd, doxycycline 100 mg IV q 12h and oseltamivir 30 mg PO qd # 3  -tolerating abx well so far; no side effects noted  -continue abx coverage  -droplet isolation  -monitor temps  -f/u CBC  -supportive care  2. Other issues:   -care per medicine
83 y/o male with PMHx of HTN, BPH, anxiety and smoking was admitted on 1/29/18 with weakness/loss of balance and tremors for 4 days preceded by decreased oral intake/poor appetite, pt was hypoxic, bradycardic, hypotensive ; he has been treated for ARF, COPD exac, Sepsis/ viral illness/ Influenza A  and Pneumonia; recieved 4 days IV Solumedrol.    During the hospital course pt has been delirius, now his mental status is much improved, he is communicating with wife; as per wife he usually walks with a stooped/hunched posture; at times stumbles has gait /postural instability, otherwise has been functional.    1) Delirium/encephalopthy; toxic-mtabolic - remakably improved    2) Gait intability / stooped posture; exam is limited as pt does not participate; however, there are signs that may suggest Parkinsons ds/Parkinsonism.    - Pt needs to be re-avaluated with PT and with neuro as OP.    d/w PT and his wife
Patient is a 82y Male with PMH of CKD3 (baseline SCr 1.8 on 2016), HTN, hyperlipidemia, BPH, current smoker sent by PCP Dr. Miller due to hypoxia 67%, hypotension and weakness. Also with low appetite and dysuria and instability when walking. On Admission, found to have +Influenza and ZELDA (B/Cr 51/4.04). Called for renal evaluation.    1. ZELDA on CKD3/4? - last SCr 1.8, peak 4.0, improving today to <3  - multifactorial - sepsis, hypotension, hypoperfusion in setting of low po intake, continued smoking and max dose ARB  - agree with holding ARB  - IVF continuous while inadequate po intake - change to 1/2NS  - renal sono with perinephric stranding and ?bladder calculi - check urine culture (already on ceftriaxone, results may be negative) - cont Abx  - urology evaluation   - lytes acceptable - no acidosis or hyperkalemia, no acute indication for dialysis  +proteinuria - 500mg - no need to order AGN w/u as proteinuria non-nephrotic    2. HTN - reamins slightly hypotensive, hold all BP meds, cont IVF    3. Influenza / Respiratory Failure / Hypoxia  - Tamiflu, oxygen, IV steroids  - for V/Q scan negative  - SHEELA Strange to cover service 2/3-4.
Pt is an 83 y/o gentleman with a PMHx of HTN, BPH, anxiety and current smoking who was sent to the ER from Dr. Miller's office.  He was having weakness/loss of balance and tremors for 4 days with decreased oral intake.  The office reports pt was suddenly found to be  hypoxic to 67% on RA, with HR 31 and BP 80/60, Temp 99F.   EMS reported  pt had normal vital signs, but then en route pt's blood pressure dropped to 80/40, his O2 sat was fluctuating, and pt was cyanotic with +cough at baseline.    Pt has not been eating much for the last two days, due to poor appetite.  No abd complaints. He reports intermittent burning with urination.  His wife reports he is barely able to walk and has been stumbling.    Pt denies palpitations , chest pain, SOB, fever, chills, n/v, edema or calf pain.  He has a chronic wet cough and runny nose which family and pt reports is unchanged.    1.Acute COPd exacerbation  2. Acute Influenza causing BRONCHIOLITIS  3. Mucus plugging cont factor to hypoxemia  4/ VQ scan low prob and low clinical suspicion for PE as well makes it unlikely  5. suspected modertae underlying COPD with chronic cough Not prev diagnosed  6. mild CHF maybe cont / renal insufficiency with elevated BUN / creat improving  7. mertabolic encephalopathy due to acute illness      plan  Tamiflu  antibiotic coverage  steroids  supplemental O2  outpt PFT  cont pulse ox monitoring cardiac eval  kidney sono eval / fu BUN / creat- get baseline  VQ and ct scan findings noted  fall precautions when OOB

## 2018-02-09 ENCOUNTER — TRANSCRIPTION ENCOUNTER (OUTPATIENT)
Age: 83
End: 2018-02-09

## 2018-02-09 VITALS — WEIGHT: 182.1 LBS

## 2018-02-09 LAB
ANION GAP SERPL CALC-SCNC: 4 MMOL/L — LOW (ref 5–17)
BUN SERPL-MCNC: 32 MG/DL — HIGH (ref 7–23)
CALCIUM SERPL-MCNC: 8.7 MG/DL — SIGNIFICANT CHANGE UP (ref 8.5–10.1)
CHLORIDE SERPL-SCNC: 109 MMOL/L — HIGH (ref 96–108)
CO2 SERPL-SCNC: 28 MMOL/L — SIGNIFICANT CHANGE UP (ref 22–31)
CREAT SERPL-MCNC: 1.6 MG/DL — HIGH (ref 0.5–1.3)
GLUCOSE SERPL-MCNC: 123 MG/DL — HIGH (ref 70–99)
HCT VFR BLD CALC: 30 % — LOW (ref 39–50)
HGB BLD-MCNC: 9.8 G/DL — LOW (ref 13–17)
MCHC RBC-ENTMCNC: 29.7 PG — SIGNIFICANT CHANGE UP (ref 27–34)
MCHC RBC-ENTMCNC: 32.6 GM/DL — SIGNIFICANT CHANGE UP (ref 32–36)
MCV RBC AUTO: 91 FL — SIGNIFICANT CHANGE UP (ref 80–100)
PLATELET # BLD AUTO: 132 K/UL — LOW (ref 150–400)
POTASSIUM SERPL-MCNC: 3.6 MMOL/L — SIGNIFICANT CHANGE UP (ref 3.5–5.3)
POTASSIUM SERPL-SCNC: 3.6 MMOL/L — SIGNIFICANT CHANGE UP (ref 3.5–5.3)
RBC # BLD: 3.29 M/UL — LOW (ref 4.2–5.8)
RBC # FLD: 12.9 % — SIGNIFICANT CHANGE UP (ref 10.3–14.5)
SODIUM SERPL-SCNC: 141 MMOL/L — SIGNIFICANT CHANGE UP (ref 135–145)
WBC # BLD: 7.5 K/UL — SIGNIFICANT CHANGE UP (ref 3.8–10.5)
WBC # FLD AUTO: 7.5 K/UL — SIGNIFICANT CHANGE UP (ref 3.8–10.5)

## 2018-02-09 RX ORDER — ATORVASTATIN CALCIUM 80 MG/1
1 TABLET, FILM COATED ORAL
Qty: 0 | Refills: 0 | COMMUNITY
Start: 2018-02-09

## 2018-02-09 RX ORDER — LOSARTAN POTASSIUM 100 MG/1
1 TABLET, FILM COATED ORAL
Qty: 0 | Refills: 0 | COMMUNITY

## 2018-02-09 RX ORDER — METOPROLOL TARTRATE 50 MG
1 TABLET ORAL
Qty: 0 | Refills: 0 | COMMUNITY
Start: 2018-02-09

## 2018-02-09 RX ORDER — FINASTERIDE 5 MG/1
1 TABLET, FILM COATED ORAL
Qty: 0 | Refills: 0 | COMMUNITY
Start: 2018-02-09

## 2018-02-09 RX ORDER — CALCIUM CARBONATE 500(1250)
2 TABLET ORAL
Qty: 0 | Refills: 0 | COMMUNITY
Start: 2018-02-09

## 2018-02-09 RX ORDER — QUETIAPINE FUMARATE 200 MG/1
1 TABLET, FILM COATED ORAL
Qty: 0 | Refills: 0 | COMMUNITY
Start: 2018-02-09

## 2018-02-09 RX ORDER — AMLODIPINE BESYLATE 2.5 MG/1
1 TABLET ORAL
Qty: 0 | Refills: 0 | COMMUNITY
Start: 2018-02-09

## 2018-02-09 RX ORDER — NICOTINE POLACRILEX 2 MG
1 GUM BUCCAL
Qty: 0 | Refills: 0 | COMMUNITY
Start: 2018-02-09

## 2018-02-09 RX ORDER — FLUCONAZOLE 150 MG/1
1 TABLET ORAL
Qty: 0 | Refills: 0 | COMMUNITY
Start: 2018-02-09

## 2018-02-09 RX ORDER — METOPROLOL TARTRATE 50 MG
1 TABLET ORAL
Qty: 0 | Refills: 0 | COMMUNITY

## 2018-02-09 RX ORDER — BETHANECHOL CHLORIDE 25 MG
1 TABLET ORAL
Qty: 0 | Refills: 0 | COMMUNITY
Start: 2018-02-09

## 2018-02-09 RX ORDER — BUDESONIDE AND FORMOTEROL FUMARATE DIHYDRATE 160; 4.5 UG/1; UG/1
2 AEROSOL RESPIRATORY (INHALATION)
Qty: 0 | Refills: 0 | COMMUNITY
Start: 2018-02-09

## 2018-02-09 RX ORDER — ASPIRIN/CALCIUM CARB/MAGNESIUM 324 MG
1 TABLET ORAL
Qty: 0 | Refills: 0 | COMMUNITY
Start: 2018-02-09

## 2018-02-09 RX ADMIN — Medication 3 MILLILITER(S): at 08:00

## 2018-02-09 RX ADMIN — HEPARIN SODIUM 5000 UNIT(S): 5000 INJECTION INTRAVENOUS; SUBCUTANEOUS at 12:14

## 2018-02-09 RX ADMIN — Medication 10 MILLIGRAM(S): at 06:45

## 2018-02-09 RX ADMIN — Medication 50 MILLIGRAM(S): at 06:45

## 2018-02-09 RX ADMIN — Medication 1 PATCH: at 12:15

## 2018-02-09 RX ADMIN — Medication 3 MILLILITER(S): at 11:22

## 2018-02-09 RX ADMIN — BUDESONIDE AND FORMOTEROL FUMARATE DIHYDRATE 2 PUFF(S): 160; 4.5 AEROSOL RESPIRATORY (INHALATION) at 07:59

## 2018-02-09 RX ADMIN — AMLODIPINE BESYLATE 2.5 MILLIGRAM(S): 2.5 TABLET ORAL at 06:45

## 2018-02-09 RX ADMIN — SODIUM CHLORIDE 100 MILLILITER(S): 9 INJECTION, SOLUTION INTRAVENOUS at 04:12

## 2018-02-09 NOTE — DISCHARGE NOTE ADULT - CARE PROVIDERS DIRECT ADDRESSES
,[email or web address]

## 2018-02-09 NOTE — DISCHARGE NOTE ADULT - CARE PLAN
Principal Discharge DX:	Dementia  Goal:	Continue with Seroquel 25mg BID  Assessment and plan of treatment:	- Follow up for oupt workup with neurologist Dr. Rivers as outpt.  Secondary Diagnosis:	Urinary retention  Goal:	Continue with romero catheter  Assessment and plan of treatment:	- Follow up with urologist as outpt.  - Continue with flomax, proscar, bethanecol  Secondary Diagnosis:	Thrush, oral  Goal:	Continue with Diflucan 100mg PO QD x 5 more days to complete 7 day treatment  Assessment and plan of treatment:	- Pt received 200mg PO on 2/8 and 100mg on 2/9

## 2018-02-09 NOTE — DISCHARGE NOTE ADULT - PATIENT PORTAL LINK FT
You can access the [facility] Patient Portal, offered by [facility], by registering with the following website: [email or web address]

## 2018-02-09 NOTE — DISCHARGE NOTE ADULT - PLAN OF CARE
Continue with Seroquel 25mg BID - Follow up for oupt workup with neurologist Dr. Rivers as outpt. Continue with romero catheter - Follow up with urologist as outpt.  - Continue with flomax, proscar, bethanecol Continue with Diflucan 100mg PO QD x 5 more days to complete 7 day treatment - Pt received 200mg PO on 2/8 and 100mg on 2/9

## 2018-02-09 NOTE — DISCHARGE NOTE ADULT - MEDICATION SUMMARY - MEDICATIONS TO STOP TAKING
I will STOP taking the medications listed below when I get home from the hospital:    losartan 100 mg oral tablet  -- 1 tab(s) by mouth once a day    Lyrica 225 mg oral capsule  -- 1 cap(s) by mouth 2 times a day

## 2018-02-09 NOTE — DISCHARGE NOTE ADULT - MEDICATION SUMMARY - MEDICATIONS TO TAKE
I will START or STAY ON the medications listed below when I get home from the hospital:    finasteride 5 mg oral tablet  -- 1 tab(s) by mouth once a day  -- Indication: For Benign prostatic hyperplasia with incomplete bladder emptying    aspirin 81 mg oral tablet, chewable  -- 1 tab(s) by mouth once a day  -- Indication: For for your heart    calcium carbonate 500 mg (200 mg elemental calcium) oral tablet, chewable  -- 2 tab(s) by mouth every 4 hours, As needed, Heartburn  -- Indication: For calcium    tamsulosin 0.4 mg oral capsule  -- 1 cap(s) by mouth once a day  -- Indication: For Benign prostatic hyperplasia with incomplete bladder emptying    fluconazole 100 mg oral tablet  -- 1 tab(s) by mouth once a day  -- Indication: For oral thrush    atorvastatin 40 mg oral tablet  -- 1 tab(s) by mouth once a day (at bedtime)  -- Indication: For for your cholesterol    QUEtiapine 25 mg oral tablet  -- 1 tab(s) by mouth once a day (at bedtime)  -- Indication: For seroquel    QUEtiapine 25 mg oral tablet  -- 1 tab(s) by mouth once a day  -- Indication: For seroquel    metoprolol tartrate 50 mg oral tablet  -- 1 tab(s) by mouth 2 times a day  -- Indication: For for your BP/HR    budesonide-formoterol 160 mcg-4.5 mcg/inh inhalation aerosol  -- 2 puff(s) inhaled 2 times a day  -- Indication: For inhaler    amLODIPine 2.5 mg oral tablet  -- 1 tab(s) by mouth once a day  -- Indication: For for your BP/HR    bethanechol 10 mg oral tablet  -- 1 tab(s) by mouth 3 times a day  -- Indication: For Urinary retention    nicotine 14 mg/24 hr transdermal film, extended release  -- 1 patch by transdermal patch once a day  -- Indication: For smoking cessation

## 2018-02-09 NOTE — DISCHARGE NOTE ADULT - HOSPITAL COURSE
Chief Complaint/Reason for Admission: weakness, low blood pressure and hypoxia	   History of Present Illness from ED: 	  81 y/o M with a PMHx of HTN, BPH, anxiety and current smoking who was sent to the ER from Dr. Miller's office.  He was having weakness/loss of balance and tremors for 4 days PTA with decreased oral intake.  The office reports pt was suddenly found to be  hypoxic to 67% on RA, with HR 31 and BP 80/60, Temp 99F. EMS reported pt had normal vital signs, but then en route pt's blood pressure dropped to 80/40, his O2 sat was fluctuating, and pt was cyanotic with +cough at baseline. Pt received a PNA shot 2 weeks ago. He does not like to see the doctor. According to the DTRs, he was very functional and verbal prior to this admission. Was able to care for himself and was able to complete crossword puzzles. No recent travel or sick contacts. No new meds per daughters     Hospital Course: Low prob VQ scan for PE, instead for to have Influenza and underwent treatment for superimposed CAP w/ abx. Course complicated by acute delirium.    Vital Signs Last 24 Hrs  T(C): --  T(F): --  HR: 80 (09 Feb 2018 07:55) (79 - 84)  BP: 144/83 (09 Feb 2018 05:23) (144/83 - 164/84)  BP(mean): --  RR: --  SpO2: 94% (09 Feb 2018 05:23) (94% - 94%)    Physical Exam:   GENERAL APPEARANCE:  elderly, deconditioned, frail appearing, confused, but nodding to some questions  HEENT:  Head is normocephalic    Skin:  dry  NECK:  no JVD  HEART:  Regular rate and rhythm. normal S1 and S2  LUNGS:  decreased lung sounds /  no wheezing  ABDOMEN:  Soft, nontender, nondistended with good bowel sounds heard  EXTREMITIES:  no swelling  NEUROLOGICAL:  limited neuro exam    Labs: All labs reviewed.    HOSPITAL COURSE BY PROBLEM:    # Sepsis secondary to Influenza AH3 / CAP  - completed Tamiflu and 4 days IV abx.   - Duplex LE neg for DVT / low Prob V/Q scan. Off Hep drip.   - resume metoprolol and resume Flomax    # acute on chronic hypoxic respiratory failure secondary to acute COPD excerebration / CAP / Acute Influenza causing BRONCHIOLITIS / Mucus plugging cont factor to hypoxemia  - s/p 4 days IV Solumedrol --> will stop steroids, no need for further use, no wheezing and respiratory status improving.   - Steroids may also contribute to his delirium  - cont nebs  - smoking cessation    # cognitive impairment /  sundowning  # Delirium / Toxic metabolic encephalopathy related to above  # Motor and gait instability  - CTH negative for acute pathology  - Nl RPR/TSH/Lyme. Nl ammonia level  - No other infectious process identified. TSH, RPR nl/neg respectively.   - Repeat UA in setting of urine retention; no evidence of infection; s/p 4 days of ceftriaxone; start flomax for urine retention   - C/W Seroquel Qam and QHS   - discussed w/ daughters for outpatient dementia workup with neuro. Inpatient work up negative: neg syphillis screen / B12 normal. Outpt neurology follow up. D/W Dr rodriguez  - TSH mildly decreased, normal Free T4 --> likely sick euthyroid.   - Needs aggressive physical therapy ---> plan to discharge to rehab  - Family given realistic expectations on his prognosis and post rehab care; realistically he may need LT placement which they are not ready to do; therefore they may have to look into private hire in the home setting. It in unsure how much of his motor and gait instability will regress but with the proper rehabilitation, I am hopeful he will regain some of his cognitive and motor skills once again    # Elevated troponin secondary to demand ischemia secondary to flu  - normalized  - started on ASA/STATIN    # ARF 2/2 to post obstructive nephropathy / enlarged prostate  - encourage po intake of water  - hold losartan  - US and CT bladder: no evidence of obstructive hydro/mass/stone  - CT c/w above: start flomax, bethanecol, proscar and insert romero; dc to rehab with romero; UDS as outpt    # Thrombocytopenia, anemia, elevated ESR -  Likely consumptive from sepsis  now resolving - no evidence of bleeding    # oral thrush  - Diflucan 200mg PO x1 and then Diflucan 100mg PO x 6 days for total of 7 days treatment    Dispo: Case discussed in depth w/ family and staff. Called PCP - left message awaiting call back to further discuss his baseline mental status.   Total time > 60mins. Plan DC to [facility] Chief Complaint/Reason for Admission: weakness, low blood pressure and hypoxia	   History of Present Illness from ED: 	  81 y/o M with a PMHx of HTN, BPH, anxiety and current smoking who was sent to the ER from Dr. Miller's office.  He was having weakness/loss of balance and tremors for 4 days PTA with decreased oral intake.  The office reports pt was suddenly found to be  hypoxic to 67% on RA, with HR 31 and BP 80/60, Temp 99F. EMS reported pt had normal vital signs, but then en route pt's blood pressure dropped to 80/40, his O2 sat was fluctuating, and pt was cyanotic with +cough at baseline. Pt received a PNA shot 2 weeks ago. He does not like to see the doctor. According to the DTRs, he was very functional and verbal prior to this admission. Was able to care for himself and was able to complete crossword puzzles. No recent travel or sick contacts. No new meds per daughters     Hospital Course: Low prob VQ scan for PE, instead for to have Influenza and underwent treatment for superimposed CAP w/ abx. Course complicated by acute delirium 2/2 to the same ie toxic metabolic encephalopathy    Vital Signs Last 24 Hrs  T(C): --  T(F): --  HR: 80 (09 Feb 2018 07:55) (79 - 84)  BP: 144/83 (09 Feb 2018 05:23) (144/83 - 164/84)  BP(mean): --  RR: --  SpO2: 94% (09 Feb 2018 05:23) (94% - 94%)    Physical Exam:   GENERAL APPEARANCE:  elderly, deconditioned, frail appearing, confused, but nodding to some questions  HEENT:  Head is normocephalic    Skin:  dry  NECK:  no JVD  HEART:  Regular rate and rhythm. normal S1 and S2  LUNGS:  decreased lung sounds /  no wheezing  ABDOMEN:  Soft, nontender, nondistended with good bowel sounds heard  EXTREMITIES:  no swelling  NEUROLOGICAL:  limited neuro exam    Labs: All labs reviewed.    HOSPITAL COURSE BY PROBLEM:    # Sepsis secondary to Influenza AH3 / CAP  - completed Tamiflu and 4 days IV abx.   - Duplex LE neg for DVT / low Prob V/Q scan. Off Hep drip.   - resume metoprolol and resume Flomax    # acute on chronic hypoxic respiratory failure secondary to acute COPD excerebration / CAP / Acute Influenza causing BRONCHIOLITIS / Mucus plugging cont factor to hypoxemia  - s/p 4 days IV Solumedrol --> will stop steroids, no need for further use, no wheezing and respiratory status improving.   - Steroids may also contribute to his delirium  - cont nebs  - smoking cessation    # cognitive impairment /  sundowning  # Delirium / Toxic metabolic encephalopathy related to above  # Motor and gait instability  - CTH negative for acute pathology  - Nl RPR/TSH/Lyme. Nl ammonia level  - No other infectious process identified. TSH, RPR nl/neg respectively.   - Repeat UA in setting of urine retention; no evidence of infection; s/p 4 days of ceftriaxone; start flomax for urine retention   - C/W Seroquel Qam and QHS   - discussed w/ daughters for outpatient dementia workup with neuro. Inpatient work up negative: neg syphillis screen / B12 normal. Outpt neurology follow up. D/W Dr rodriguez  - TSH mildly decreased, normal Free T4 --> likely sick euthyroid.   - Needs aggressive physical therapy ---> plan to discharge to rehab  - Family given realistic expectations on his prognosis and post rehab care; realistically he may need LT placement which they are not ready to do; therefore they may have to look into private hire in the home setting. It in unsure how much of his motor and gait instability will regress but with the proper rehabilitation, I am hopeful he will regain some of his cognitive and motor skills once again    # Elevated troponin secondary to demand ischemia secondary to flu  - normalized  - started on ASA/STATIN    # ARF 2/2 to post obstructive nephropathy / enlarged prostate  - encourage po intake of water  - hold losartan  - US and CT bladder: no evidence of obstructive hydro/mass/stone  - CT c/w above: start flomax, bethanecol, proscar and insert romero; dc to rehab with romero; UDS as outpt    # Thrombocytopenia, anemia, elevated ESR -  Likely consumptive from sepsis  now resolving - no evidence of bleeding    # oral thrush  - Diflucan 200mg PO x1 and then Diflucan 100mg PO x 6 days for total of 7 days treatment    Dispo: Case discussed in depth w/ family and staff. Called PCP - left message awaiting call back to further discuss his baseline mental status.   Total time > 60mins. Plan DC to [facility]

## 2018-02-09 NOTE — DISCHARGE NOTE ADULT - CARE PROVIDER_API CALL
Dulce Maria Dukes), Neurology  [address] [facility]  [address]  [place] [address]  Phone: [phone]  Fax: [phone]    Carlos Miller), Medicine  [address] [place] [address]  Phone: [phone]  Fax: [phone]

## 2018-02-18 DIAGNOSIS — J44.0 CHRONIC OBSTRUCTIVE PULMONARY DISEASE WITH (ACUTE) LOWER RESPIRATORY INFECTION: ICD-10-CM

## 2018-02-18 DIAGNOSIS — I24.8 OTHER FORMS OF ACUTE ISCHEMIC HEART DISEASE: ICD-10-CM

## 2018-02-18 DIAGNOSIS — E87.0 HYPEROSMOLALITY AND HYPERNATREMIA: ICD-10-CM

## 2018-02-18 DIAGNOSIS — I95.9 HYPOTENSION, UNSPECIFIED: ICD-10-CM

## 2018-02-18 DIAGNOSIS — A41.89 OTHER SPECIFIED SEPSIS: ICD-10-CM

## 2018-02-18 DIAGNOSIS — I12.9 HYPERTENSIVE CHRONIC KIDNEY DISEASE WITH STAGE 1 THROUGH STAGE 4 CHRONIC KIDNEY DISEASE, OR UNSPECIFIED CHRONIC KIDNEY DISEASE: ICD-10-CM

## 2018-02-18 DIAGNOSIS — R33.9 RETENTION OF URINE, UNSPECIFIED: ICD-10-CM

## 2018-02-18 DIAGNOSIS — F05 DELIRIUM DUE TO KNOWN PHYSIOLOGICAL CONDITION: ICD-10-CM

## 2018-02-18 DIAGNOSIS — B37.0 CANDIDAL STOMATITIS: ICD-10-CM

## 2018-02-18 DIAGNOSIS — N18.3 CHRONIC KIDNEY DISEASE, STAGE 3 (MODERATE): ICD-10-CM

## 2018-02-18 DIAGNOSIS — N40.1 BENIGN PROSTATIC HYPERPLASIA WITH LOWER URINARY TRACT SYMPTOMS: ICD-10-CM

## 2018-02-18 DIAGNOSIS — J44.1 CHRONIC OBSTRUCTIVE PULMONARY DISEASE WITH (ACUTE) EXACERBATION: ICD-10-CM

## 2018-02-18 DIAGNOSIS — N21.0 CALCULUS IN BLADDER: ICD-10-CM

## 2018-02-18 DIAGNOSIS — Z85.828 PERSONAL HISTORY OF OTHER MALIGNANT NEOPLASM OF SKIN: ICD-10-CM

## 2018-02-18 DIAGNOSIS — F17.210 NICOTINE DEPENDENCE, CIGARETTES, UNCOMPLICATED: ICD-10-CM

## 2018-02-18 DIAGNOSIS — J10.1 INFLUENZA DUE TO OTHER IDENTIFIED INFLUENZA VIRUS WITH OTHER RESPIRATORY MANIFESTATIONS: ICD-10-CM

## 2018-02-18 DIAGNOSIS — J96.21 ACUTE AND CHRONIC RESPIRATORY FAILURE WITH HYPOXIA: ICD-10-CM

## 2018-02-18 DIAGNOSIS — D64.9 ANEMIA, UNSPECIFIED: ICD-10-CM

## 2018-02-18 DIAGNOSIS — F41.9 ANXIETY DISORDER, UNSPECIFIED: ICD-10-CM

## 2018-02-18 DIAGNOSIS — N17.9 ACUTE KIDNEY FAILURE, UNSPECIFIED: ICD-10-CM

## 2018-02-18 DIAGNOSIS — G93.41 METABOLIC ENCEPHALOPATHY: ICD-10-CM

## 2018-02-18 DIAGNOSIS — D69.6 THROMBOCYTOPENIA, UNSPECIFIED: ICD-10-CM

## 2018-02-18 DIAGNOSIS — J10.00 INFLUENZA DUE TO OTHER IDENTIFIED INFLUENZA VIRUS WITH UNSPECIFIED TYPE OF PNEUMONIA: ICD-10-CM

## 2018-03-13 ENCOUNTER — EMERGENCY (EMERGENCY)
Facility: HOSPITAL | Age: 83
LOS: 0 days | Discharge: HOME | End: 2018-03-13
Attending: EMERGENCY MEDICINE | Admitting: EMERGENCY MEDICINE
Payer: MEDICARE

## 2018-03-13 VITALS
RESPIRATION RATE: 18 BRPM | DIASTOLIC BLOOD PRESSURE: 64 MMHG | SYSTOLIC BLOOD PRESSURE: 124 MMHG | OXYGEN SATURATION: 100 % | TEMPERATURE: 99 F | HEART RATE: 70 BPM

## 2018-03-13 VITALS
HEART RATE: 75 BPM | WEIGHT: 179.9 LBS | SYSTOLIC BLOOD PRESSURE: 118 MMHG | RESPIRATION RATE: 17 BRPM | OXYGEN SATURATION: 93 % | DIASTOLIC BLOOD PRESSURE: 46 MMHG | TEMPERATURE: 99 F

## 2018-03-13 DIAGNOSIS — Z79.51 LONG TERM (CURRENT) USE OF INHALED STEROIDS: ICD-10-CM

## 2018-03-13 DIAGNOSIS — N39.0 URINARY TRACT INFECTION, SITE NOT SPECIFIED: ICD-10-CM

## 2018-03-13 DIAGNOSIS — R33.8 OTHER RETENTION OF URINE: ICD-10-CM

## 2018-03-13 DIAGNOSIS — F17.210 NICOTINE DEPENDENCE, CIGARETTES, UNCOMPLICATED: ICD-10-CM

## 2018-03-13 DIAGNOSIS — R53.1 WEAKNESS: ICD-10-CM

## 2018-03-13 DIAGNOSIS — R31.9 HEMATURIA, UNSPECIFIED: ICD-10-CM

## 2018-03-13 DIAGNOSIS — Z79.82 LONG TERM (CURRENT) USE OF ASPIRIN: ICD-10-CM

## 2018-03-13 DIAGNOSIS — F41.8 OTHER SPECIFIED ANXIETY DISORDERS: ICD-10-CM

## 2018-03-13 DIAGNOSIS — Z79.899 OTHER LONG TERM (CURRENT) DRUG THERAPY: ICD-10-CM

## 2018-03-13 LAB
ALBUMIN SERPL ELPH-MCNC: 2.5 G/DL — LOW (ref 3.3–5)
ALP SERPL-CCNC: 133 U/L — HIGH (ref 40–120)
ALT FLD-CCNC: 22 U/L — SIGNIFICANT CHANGE UP (ref 12–78)
ANION GAP SERPL CALC-SCNC: 8 MMOL/L — SIGNIFICANT CHANGE UP (ref 5–17)
APPEARANCE UR: (no result)
APTT BLD: 27.1 SEC — LOW (ref 27.5–37.4)
AST SERPL-CCNC: 15 U/L — SIGNIFICANT CHANGE UP (ref 15–37)
BACTERIA # UR AUTO: (no result)
BASOPHILS # BLD AUTO: 0.01 K/UL — SIGNIFICANT CHANGE UP (ref 0–0.2)
BASOPHILS NFR BLD AUTO: 0.1 % — SIGNIFICANT CHANGE UP (ref 0–2)
BILIRUB SERPL-MCNC: 1 MG/DL — SIGNIFICANT CHANGE UP (ref 0.2–1.2)
BILIRUB UR-MCNC: NEGATIVE — SIGNIFICANT CHANGE UP
BUN SERPL-MCNC: 39 MG/DL — HIGH (ref 7–23)
CALCIUM SERPL-MCNC: 9.5 MG/DL — SIGNIFICANT CHANGE UP (ref 8.5–10.1)
CHLORIDE SERPL-SCNC: 97 MMOL/L — SIGNIFICANT CHANGE UP (ref 96–108)
CO2 SERPL-SCNC: 28 MMOL/L — SIGNIFICANT CHANGE UP (ref 22–31)
COLOR SPEC: YELLOW — SIGNIFICANT CHANGE UP
CREAT SERPL-MCNC: 2.49 MG/DL — HIGH (ref 0.5–1.3)
DIFF PNL FLD: (no result)
EOSINOPHIL # BLD AUTO: 0 K/UL — SIGNIFICANT CHANGE UP (ref 0–0.5)
EOSINOPHIL NFR BLD AUTO: 0 % — SIGNIFICANT CHANGE UP (ref 0–6)
GLUCOSE SERPL-MCNC: 102 MG/DL — HIGH (ref 70–99)
GLUCOSE UR QL: NEGATIVE MG/DL — SIGNIFICANT CHANGE UP
HCT VFR BLD CALC: 26.7 % — LOW (ref 39–50)
HGB BLD-MCNC: 8.6 G/DL — LOW (ref 13–17)
IMM GRANULOCYTES NFR BLD AUTO: 1 % — SIGNIFICANT CHANGE UP (ref 0–1.5)
INR BLD: 1.29 RATIO — HIGH (ref 0.88–1.16)
KETONES UR-MCNC: NEGATIVE — SIGNIFICANT CHANGE UP
LEUKOCYTE ESTERASE UR-ACNC: (no result)
LYMPHOCYTES # BLD AUTO: 1.94 K/UL — SIGNIFICANT CHANGE UP (ref 1–3.3)
LYMPHOCYTES # BLD AUTO: 15.6 % — SIGNIFICANT CHANGE UP (ref 13–44)
MCHC RBC-ENTMCNC: 29.7 PG — SIGNIFICANT CHANGE UP (ref 27–34)
MCHC RBC-ENTMCNC: 32.2 GM/DL — SIGNIFICANT CHANGE UP (ref 32–36)
MCV RBC AUTO: 92.1 FL — SIGNIFICANT CHANGE UP (ref 80–100)
MONOCYTES # BLD AUTO: 1.32 K/UL — HIGH (ref 0–0.9)
MONOCYTES NFR BLD AUTO: 10.6 % — SIGNIFICANT CHANGE UP (ref 2–14)
NEUTROPHILS # BLD AUTO: 9.03 K/UL — HIGH (ref 1.8–7.4)
NEUTROPHILS NFR BLD AUTO: 72.7 % — SIGNIFICANT CHANGE UP (ref 43–77)
NITRITE UR-MCNC: POSITIVE
NRBC # BLD: 0 /100 WBCS — SIGNIFICANT CHANGE UP (ref 0–0)
PH UR: 7 — SIGNIFICANT CHANGE UP (ref 5–8)
PLATELET # BLD AUTO: 197 K/UL — SIGNIFICANT CHANGE UP (ref 150–400)
POTASSIUM SERPL-MCNC: 5.6 MMOL/L — HIGH (ref 3.5–5.3)
POTASSIUM SERPL-SCNC: 5.6 MMOL/L — HIGH (ref 3.5–5.3)
PROT SERPL-MCNC: 7 GM/DL — SIGNIFICANT CHANGE UP (ref 6–8.3)
PROT UR-MCNC: 30 MG/DL
PROTHROM AB SERPL-ACNC: 14 SEC — HIGH (ref 9.8–12.7)
RBC # BLD: 2.9 M/UL — LOW (ref 4.2–5.8)
RBC # FLD: 15.2 % — HIGH (ref 10.3–14.5)
RBC CASTS # UR COMP ASSIST: (no result) /HPF (ref 0–4)
SODIUM SERPL-SCNC: 133 MMOL/L — LOW (ref 135–145)
SP GR SPEC: 1 — LOW (ref 1.01–1.02)
UROBILINOGEN FLD QL: NEGATIVE MG/DL — SIGNIFICANT CHANGE UP
WBC # BLD: 12.43 K/UL — HIGH (ref 3.8–10.5)
WBC # FLD AUTO: 12.43 K/UL — HIGH (ref 3.8–10.5)
WBC UR QL: >50

## 2018-03-13 PROCEDURE — 99285 EMERGENCY DEPT VISIT HI MDM: CPT

## 2018-03-13 PROCEDURE — 70450 CT HEAD/BRAIN W/O DYE: CPT | Mod: 26

## 2018-03-13 RX ORDER — CEPHALEXIN 500 MG
1 CAPSULE ORAL
Qty: 20 | Refills: 0 | OUTPATIENT
Start: 2018-03-13 | End: 2018-03-22

## 2018-03-13 RX ORDER — CEFTRIAXONE 500 MG/1
1 INJECTION, POWDER, FOR SOLUTION INTRAMUSCULAR; INTRAVENOUS ONCE
Qty: 0 | Refills: 0 | Status: DISCONTINUED | OUTPATIENT
Start: 2018-03-13 | End: 2018-03-13

## 2018-03-13 RX ORDER — SODIUM CHLORIDE 9 MG/ML
1000 INJECTION INTRAMUSCULAR; INTRAVENOUS; SUBCUTANEOUS ONCE
Qty: 0 | Refills: 0 | Status: COMPLETED | OUTPATIENT
Start: 2018-03-13 | End: 2018-03-13

## 2018-03-13 RX ORDER — CEFTRIAXONE 500 MG/1
1000 INJECTION, POWDER, FOR SOLUTION INTRAMUSCULAR; INTRAVENOUS ONCE
Qty: 0 | Refills: 0 | Status: COMPLETED | OUTPATIENT
Start: 2018-03-13 | End: 2018-03-13

## 2018-03-13 RX ADMIN — SODIUM CHLORIDE 1000 MILLILITER(S): 9 INJECTION INTRAMUSCULAR; INTRAVENOUS; SUBCUTANEOUS at 18:15

## 2018-03-13 RX ADMIN — CEFTRIAXONE 1000 MILLIGRAM(S): 500 INJECTION, POWDER, FOR SOLUTION INTRAMUSCULAR; INTRAVENOUS at 18:38

## 2018-03-13 NOTE — ED PROVIDER NOTE - MEDICAL DECISION MAKING DETAILS
83 y/o male presents to the ED c/o dysuria, increased weakness and shaking, plans for Schreiber catheter, CT scan head NPH, basic labs and UA, administer IV fluid bolus.

## 2018-03-13 NOTE — ED ADULT NURSE NOTE - OBJECTIVE STATEMENT
pt comes in c/o urinary retention, denies any chest pain / sob. pt comes in c/o urinary retention after having romero removed 10 days ago per family. Pt denies any chest pain / sob.

## 2018-03-13 NOTE — ED PROVIDER NOTE - ENMT, MLM
Airway patent, Nasal mucosa clear. Throat has no vesicles, no oropharyngeal exudates and uvula is midline. Dry mucous membranes.

## 2018-03-13 NOTE — ED PROVIDER NOTE - OBJECTIVE STATEMENT
81 y/o male with PMHx of Anxiety, HTN presents to the ED c/o increased weakness and shaking which began this morning. Pt family states that he has increased legs swelling and has not been walking as usual. Pt states he just got over the Flu, PNA and just got out of rehabilitation. Pt states he has had some difficulty urinating and feels unsteady. Pt family states that yesterday he started shaking and stopped walking and "lost all his strength." Pt denies any dizziness, light headed ness, diarrhea, abd pain or blood in his urine or stool. Pt denies any history of kidney or heart complications. Pt takes 81mg of ASA daily. Urologist: Dr. Dhillon PCP Dr. Miller

## 2018-03-13 NOTE — ED PROVIDER NOTE - CARE PLAN
Principal Discharge DX:	Urinary tract infection with hematuria, site unspecified  Secondary Diagnosis:	Urinary retention

## 2018-03-13 NOTE — ED ADULT TRIAGE NOTE - CHIEF COMPLAINT QUOTE
Pt presents to the ED with no complaints as per Family pt is experiencing difficulty urinating and unsteady gait.

## 2018-03-14 PROBLEM — I10 ESSENTIAL (PRIMARY) HYPERTENSION: Chronic | Status: ACTIVE | Noted: 2018-01-29

## 2018-03-14 PROBLEM — F41.9 ANXIETY DISORDER, UNSPECIFIED: Chronic | Status: ACTIVE | Noted: 2018-01-29

## 2018-03-15 PROBLEM — Z00.00 ENCOUNTER FOR PREVENTIVE HEALTH EXAMINATION: Status: ACTIVE | Noted: 2018-03-15

## 2018-03-15 LAB
-  AMPICILLIN: SIGNIFICANT CHANGE UP
-  CIPROFLOXACIN: SIGNIFICANT CHANGE UP
-  NITROFURANTOIN: SIGNIFICANT CHANGE UP
-  TETRACYCLINE: SIGNIFICANT CHANGE UP
-  VANCOMYCIN: SIGNIFICANT CHANGE UP
CULTURE RESULTS: SIGNIFICANT CHANGE UP
METHOD TYPE: SIGNIFICANT CHANGE UP
ORGANISM # SPEC MICROSCOPIC CNT: SIGNIFICANT CHANGE UP
ORGANISM # SPEC MICROSCOPIC CNT: SIGNIFICANT CHANGE UP
SPECIMEN SOURCE: SIGNIFICANT CHANGE UP

## 2018-03-16 RX ORDER — AMPICILLIN TRIHYDRATE 250 MG
1 CAPSULE ORAL
Qty: 28 | Refills: 0 | OUTPATIENT
Start: 2018-03-16 | End: 2018-03-22

## 2018-03-16 NOTE — ED POST DISCHARGE NOTE - RESULT SUMMARY
sensitivity no checked for keflex which patient is on for UTI.  Call placed to wife who reports he is only slightly better.  UA sensitive to ampicillin, as per UTD can treat genitourinary infections with 500mg PO q6 hours.  Reviewed return precautions and send ampicillin to pharm -iY Novak PA-C

## 2018-03-20 ENCOUNTER — APPOINTMENT (OUTPATIENT)
Dept: UROLOGY | Facility: CLINIC | Age: 83
End: 2018-03-20
Payer: MEDICARE

## 2018-03-20 VITALS
HEIGHT: 72 IN | OXYGEN SATURATION: 99 % | TEMPERATURE: 97.3 F | DIASTOLIC BLOOD PRESSURE: 58 MMHG | HEART RATE: 67 BPM | SYSTOLIC BLOOD PRESSURE: 121 MMHG | BODY MASS INDEX: 24.38 KG/M2 | WEIGHT: 180 LBS

## 2018-03-20 DIAGNOSIS — Z72.0 TOBACCO USE: ICD-10-CM

## 2018-03-20 DIAGNOSIS — N40.1 BENIGN PROSTATIC HYPERPLASIA WITH LOWER URINARY TRACT SYMPMS: ICD-10-CM

## 2018-03-20 DIAGNOSIS — N13.8 BENIGN PROSTATIC HYPERPLASIA WITH LOWER URINARY TRACT SYMPMS: ICD-10-CM

## 2018-03-20 DIAGNOSIS — Z86.79 PERSONAL HISTORY OF OTHER DISEASES OF THE CIRCULATORY SYSTEM: ICD-10-CM

## 2018-03-20 DIAGNOSIS — R33.9 RETENTION OF URINE, UNSPECIFIED: ICD-10-CM

## 2018-03-20 PROCEDURE — 99204 OFFICE O/P NEW MOD 45 MIN: CPT

## 2018-03-20 RX ORDER — BETHANECHOL CHLORIDE 50 MG/1
50 TABLET ORAL
Qty: 60 | Refills: 1 | Status: ACTIVE | COMMUNITY
Start: 2018-03-20 | End: 1900-01-01

## 2018-03-20 RX ORDER — KETOROLAC TROMETHAMINE 10 MG/1
10 TABLET, FILM COATED ORAL EVERY 4 HOURS
Qty: 20 | Refills: 0 | Status: ACTIVE | COMMUNITY
Start: 2018-03-20 | End: 1900-01-01

## 2018-03-20 RX ORDER — TAMSULOSIN HYDROCHLORIDE 0.4 MG/1
0.4 CAPSULE ORAL
Qty: 180 | Refills: 3 | Status: ACTIVE | COMMUNITY
Start: 2018-03-20 | End: 1900-01-01

## 2018-03-21 PROBLEM — Z72.0 TOBACCO USE: Status: ACTIVE | Noted: 2018-03-21

## 2018-03-21 PROBLEM — Z86.79 HISTORY OF HYPERTENSION: Status: RESOLVED | Noted: 2018-03-21 | Resolved: 2018-03-21

## 2018-03-27 DIAGNOSIS — N48.89 OTHER SPECIFIED DISORDERS OF PENIS: ICD-10-CM

## 2018-03-28 RX ORDER — LIDOCAINE 5 G/100G
5 OINTMENT TOPICAL
Qty: 45 | Refills: 2 | Status: ACTIVE | COMMUNITY
Start: 2018-03-27

## 2018-04-10 ENCOUNTER — APPOINTMENT (OUTPATIENT)
Dept: UROLOGY | Facility: CLINIC | Age: 83
End: 2018-04-10

## 2018-04-12 NOTE — ASU PATIENT PROFILE, ADULT - PMH
Anxiety    Erosion of urethra due to catheterization of urinary tract, initial encounter    HTN (hypertension)    Urinary retention

## 2018-04-13 ENCOUNTER — OUTPATIENT (OUTPATIENT)
Dept: OUTPATIENT SERVICES | Facility: HOSPITAL | Age: 83
LOS: 1 days | Discharge: HOME | End: 2018-04-13
Payer: MEDICARE

## 2018-04-13 VITALS
HEART RATE: 58 BPM | OXYGEN SATURATION: 100 % | SYSTOLIC BLOOD PRESSURE: 140 MMHG | WEIGHT: 185.41 LBS | RESPIRATION RATE: 14 BRPM | TEMPERATURE: 98 F | DIASTOLIC BLOOD PRESSURE: 63 MMHG | HEIGHT: 72 IN

## 2018-04-13 VITALS
RESPIRATION RATE: 14 BRPM | SYSTOLIC BLOOD PRESSURE: 143 MMHG | HEART RATE: 53 BPM | OXYGEN SATURATION: 100 % | TEMPERATURE: 98 F | DIASTOLIC BLOOD PRESSURE: 61 MMHG

## 2018-04-13 DIAGNOSIS — Z87.440 PERSONAL HISTORY OF URINARY (TRACT) INFECTIONS: ICD-10-CM

## 2018-04-13 DIAGNOSIS — Z80.42 FAMILY HISTORY OF MALIGNANT NEOPLASM OF PROSTATE: ICD-10-CM

## 2018-04-13 DIAGNOSIS — N40.1 BENIGN PROSTATIC HYPERPLASIA WITH LOWER URINARY TRACT SYMPTOMS: ICD-10-CM

## 2018-04-13 DIAGNOSIS — R35.1 NOCTURIA: ICD-10-CM

## 2018-04-13 DIAGNOSIS — Z86.73 PERSONAL HISTORY OF TRANSIENT ISCHEMIC ATTACK (TIA), AND CEREBRAL INFARCTION WITHOUT RESIDUAL DEFICITS: ICD-10-CM

## 2018-04-13 DIAGNOSIS — I10 ESSENTIAL (PRIMARY) HYPERTENSION: ICD-10-CM

## 2018-04-13 DIAGNOSIS — F17.200 NICOTINE DEPENDENCE, UNSPECIFIED, UNCOMPLICATED: ICD-10-CM

## 2018-04-13 DIAGNOSIS — R33.8 OTHER RETENTION OF URINE: ICD-10-CM

## 2018-04-13 DIAGNOSIS — G90.09 OTHER IDIOPATHIC PERIPHERAL AUTONOMIC NEUROPATHY: ICD-10-CM

## 2018-04-13 LAB
ANION GAP SERPL CALC-SCNC: 8 MMOL/L — SIGNIFICANT CHANGE UP (ref 5–17)
APTT BLD: 27.9 SEC — SIGNIFICANT CHANGE UP (ref 27.5–37.4)
BUN SERPL-MCNC: 24 MG/DL — HIGH (ref 7–23)
CALCIUM SERPL-MCNC: 9.5 MG/DL — SIGNIFICANT CHANGE UP (ref 8.5–10.1)
CHLORIDE SERPL-SCNC: 109 MMOL/L — HIGH (ref 96–108)
CO2 SERPL-SCNC: 27 MMOL/L — SIGNIFICANT CHANGE UP (ref 22–31)
CREAT SERPL-MCNC: 1.48 MG/DL — HIGH (ref 0.5–1.3)
GLUCOSE SERPL-MCNC: 101 MG/DL — HIGH (ref 70–99)
HCT VFR BLD CALC: 30.7 % — LOW (ref 39–50)
HGB BLD-MCNC: 9.7 G/DL — LOW (ref 13–17)
INR BLD: 1.12 RATIO — SIGNIFICANT CHANGE UP (ref 0.88–1.16)
MCHC RBC-ENTMCNC: 30 PG — SIGNIFICANT CHANGE UP (ref 27–34)
MCHC RBC-ENTMCNC: 31.6 GM/DL — LOW (ref 32–36)
MCV RBC AUTO: 95 FL — SIGNIFICANT CHANGE UP (ref 80–100)
NRBC # BLD: 0 /100 WBCS — SIGNIFICANT CHANGE UP (ref 0–0)
PLATELET # BLD AUTO: 163 K/UL — SIGNIFICANT CHANGE UP (ref 150–400)
POTASSIUM SERPL-MCNC: 4.2 MMOL/L — SIGNIFICANT CHANGE UP (ref 3.5–5.3)
POTASSIUM SERPL-SCNC: 4.2 MMOL/L — SIGNIFICANT CHANGE UP (ref 3.5–5.3)
PROTHROM AB SERPL-ACNC: 12.1 SEC — SIGNIFICANT CHANGE UP (ref 9.8–12.7)
RBC # BLD: 3.23 M/UL — LOW (ref 4.2–5.8)
RBC # FLD: 15.1 % — HIGH (ref 10.3–14.5)
SODIUM SERPL-SCNC: 144 MMOL/L — SIGNIFICANT CHANGE UP (ref 135–145)
WBC # BLD: 6.09 K/UL — SIGNIFICANT CHANGE UP (ref 3.8–10.5)
WBC # FLD AUTO: 6.09 K/UL — SIGNIFICANT CHANGE UP (ref 3.8–10.5)

## 2018-04-13 PROCEDURE — 77012 CT SCAN FOR NEEDLE BIOPSY: CPT | Mod: 26

## 2018-04-13 PROCEDURE — 51702 INSERT TEMP BLADDER CATH: CPT

## 2018-04-13 RX ORDER — FENTANYL CITRATE 50 UG/ML
50 INJECTION INTRAVENOUS
Qty: 0 | Refills: 0 | Status: DISCONTINUED | OUTPATIENT
Start: 2018-04-13 | End: 2018-04-13

## 2018-04-13 RX ORDER — ACETAMINOPHEN 500 MG
100 TABLET ORAL
Qty: 0 | Refills: 0 | COMMUNITY
Start: 2018-04-13

## 2018-04-13 RX ORDER — OXYCODONE HYDROCHLORIDE 5 MG/1
1 TABLET ORAL
Qty: 0 | Refills: 0 | COMMUNITY
Start: 2018-04-13

## 2018-04-13 RX ORDER — ONDANSETRON 8 MG/1
4 TABLET, FILM COATED ORAL ONCE
Qty: 0 | Refills: 0 | Status: DISCONTINUED | OUTPATIENT
Start: 2018-04-13 | End: 2018-04-13

## 2018-04-13 RX ORDER — ACETAMINOPHEN 500 MG
1000 TABLET ORAL ONCE
Qty: 0 | Refills: 0 | Status: COMPLETED | OUTPATIENT
Start: 2018-04-13 | End: 2018-04-13

## 2018-04-13 RX ORDER — OXYCODONE HYDROCHLORIDE 5 MG/1
5 TABLET ORAL EVERY 4 HOURS
Qty: 0 | Refills: 0 | Status: DISCONTINUED | OUTPATIENT
Start: 2018-04-13 | End: 2018-04-13

## 2018-04-13 RX ORDER — SODIUM CHLORIDE 9 MG/ML
1000 INJECTION INTRAMUSCULAR; INTRAVENOUS; SUBCUTANEOUS
Qty: 0 | Refills: 0 | Status: DISCONTINUED | OUTPATIENT
Start: 2018-04-13 | End: 2018-04-13

## 2018-04-13 RX ORDER — FENTANYL CITRATE 50 UG/ML
5 INJECTION INTRAVENOUS
Qty: 0 | Refills: 0 | COMMUNITY
Start: 2018-04-13

## 2018-04-13 RX ADMIN — Medication 400 MILLIGRAM(S): at 15:08

## 2018-04-13 NOTE — BRIEF OPERATIVE NOTE - PROCEDURE
<<-----Click on this checkbox to enter Procedure Suprapubic catheter replacement  04/13/2018    Active  MELODIE

## 2018-05-16 PROBLEM — R33.9 RETENTION OF URINE, UNSPECIFIED: Chronic | Status: ACTIVE | Noted: 2018-04-12

## 2018-05-16 PROBLEM — T83.89XA OTHER SPECIFIED COMPLICATION OF GENITOURINARY PROSTHETIC DEVICES, IMPLANTS AND GRAFTS, INITIAL ENCOUNTER: Chronic | Status: ACTIVE | Noted: 2018-04-12

## 2018-05-18 ENCOUNTER — OUTPATIENT (OUTPATIENT)
Dept: OUTPATIENT SERVICES | Facility: HOSPITAL | Age: 83
LOS: 1 days | Discharge: HOME | End: 2018-05-18
Payer: MEDICARE

## 2018-05-18 DIAGNOSIS — C4A.31 MERKEL CELL CARCINOMA OF NOSE: Chronic | ICD-10-CM

## 2018-05-18 DIAGNOSIS — R33.8 OTHER RETENTION OF URINE: ICD-10-CM

## 2018-05-18 DIAGNOSIS — Z93.59 OTHER CYSTOSTOMY STATUS: Chronic | ICD-10-CM

## 2018-05-18 DIAGNOSIS — Z01.818 ENCOUNTER FOR OTHER PREPROCEDURAL EXAMINATION: ICD-10-CM

## 2018-05-18 LAB
ANION GAP SERPL CALC-SCNC: 8 MMOL/L — SIGNIFICANT CHANGE UP (ref 5–17)
APPEARANCE UR: (no result)
BASOPHILS # BLD AUTO: 0.01 K/UL — SIGNIFICANT CHANGE UP (ref 0–0.2)
BASOPHILS NFR BLD AUTO: 0.2 % — SIGNIFICANT CHANGE UP (ref 0–2)
BILIRUB UR-MCNC: NEGATIVE — SIGNIFICANT CHANGE UP
BUN SERPL-MCNC: 23 MG/DL — SIGNIFICANT CHANGE UP (ref 7–23)
CALCIUM SERPL-MCNC: 9.2 MG/DL — SIGNIFICANT CHANGE UP (ref 8.5–10.1)
CHLORIDE SERPL-SCNC: 109 MMOL/L — HIGH (ref 96–108)
CO2 SERPL-SCNC: 26 MMOL/L — SIGNIFICANT CHANGE UP (ref 22–31)
COLOR SPEC: YELLOW — SIGNIFICANT CHANGE UP
CREAT SERPL-MCNC: 1.54 MG/DL — HIGH (ref 0.5–1.3)
DIFF PNL FLD: (no result)
EOSINOPHIL # BLD AUTO: 0.29 K/UL — SIGNIFICANT CHANGE UP (ref 0–0.5)
EOSINOPHIL NFR BLD AUTO: 5.1 % — SIGNIFICANT CHANGE UP (ref 0–6)
GLUCOSE SERPL-MCNC: 99 MG/DL — SIGNIFICANT CHANGE UP (ref 70–99)
GLUCOSE UR QL: NEGATIVE MG/DL — SIGNIFICANT CHANGE UP
HCT VFR BLD CALC: 32.9 % — LOW (ref 39–50)
HGB BLD-MCNC: 10.6 G/DL — LOW (ref 13–17)
IMM GRANULOCYTES NFR BLD AUTO: 0.4 % — SIGNIFICANT CHANGE UP (ref 0–1.5)
KETONES UR-MCNC: NEGATIVE — SIGNIFICANT CHANGE UP
LEUKOCYTE ESTERASE UR-ACNC: (no result)
LYMPHOCYTES # BLD AUTO: 2.2 K/UL — SIGNIFICANT CHANGE UP (ref 1–3.3)
LYMPHOCYTES # BLD AUTO: 38.7 % — SIGNIFICANT CHANGE UP (ref 13–44)
MCHC RBC-ENTMCNC: 30.3 PG — SIGNIFICANT CHANGE UP (ref 27–34)
MCHC RBC-ENTMCNC: 32.2 GM/DL — SIGNIFICANT CHANGE UP (ref 32–36)
MCV RBC AUTO: 94 FL — SIGNIFICANT CHANGE UP (ref 80–100)
MONOCYTES # BLD AUTO: 0.49 K/UL — SIGNIFICANT CHANGE UP (ref 0–0.9)
MONOCYTES NFR BLD AUTO: 8.6 % — SIGNIFICANT CHANGE UP (ref 2–14)
NEUTROPHILS # BLD AUTO: 2.67 K/UL — SIGNIFICANT CHANGE UP (ref 1.8–7.4)
NEUTROPHILS NFR BLD AUTO: 47 % — SIGNIFICANT CHANGE UP (ref 43–77)
NITRITE UR-MCNC: POSITIVE
PH UR: 6 — SIGNIFICANT CHANGE UP (ref 5–8)
PLATELET # BLD AUTO: 113 K/UL — LOW (ref 150–400)
POTASSIUM SERPL-MCNC: 4.5 MMOL/L — SIGNIFICANT CHANGE UP (ref 3.5–5.3)
POTASSIUM SERPL-SCNC: 4.5 MMOL/L — SIGNIFICANT CHANGE UP (ref 3.5–5.3)
PROT UR-MCNC: 100 MG/DL
RBC # BLD: 3.5 M/UL — LOW (ref 4.2–5.8)
RBC # FLD: 14 % — SIGNIFICANT CHANGE UP (ref 10.3–14.5)
SODIUM SERPL-SCNC: 143 MMOL/L — SIGNIFICANT CHANGE UP (ref 135–145)
SP GR SPEC: 1.01 — SIGNIFICANT CHANGE UP (ref 1.01–1.02)
UROBILINOGEN FLD QL: NEGATIVE MG/DL — SIGNIFICANT CHANGE UP
WBC # BLD: 5.68 K/UL — SIGNIFICANT CHANGE UP (ref 3.8–10.5)
WBC # FLD AUTO: 5.68 K/UL — SIGNIFICANT CHANGE UP (ref 3.8–10.5)

## 2018-05-18 PROCEDURE — 93010 ELECTROCARDIOGRAM REPORT: CPT

## 2018-05-18 RX ORDER — CEFUROXIME AXETIL 250 MG
1 TABLET ORAL
Qty: 0 | Refills: 0 | COMMUNITY

## 2018-05-18 NOTE — CHART NOTE - NSCHARTNOTEFT_GEN_A_CORE
BP left arm 144/61 HR  54 bpm  Ashlegih 97.6 F  RR 14/min  O2 sat 100% on RA    1. Labs as per surgeon  2. EKG  3. discussed EZ sponges & day of procedure instructions    During PST interview patient stated multiple times "this is ridiculous" about the amount of questions & testing being done. States "they didn't do all this last time". I ask multiple different times to pt & wife Pat if he came through PST prior to last procedure & wife answered "yes" on several different occasions. Wife was annoyed at the amount of medical history questions being asked and I explained this was protocol for PST interview as required by Dr Georges to safely proceed with surgery. When asked again to clarify about coming through PST previously wife then stated "I don't know i'm old" and then became agitated with me and got up and left the room. I apologized to patient about extent of interview but once again explained this was required by surgeon. Wife came back into room and said she was going to file a complaint against me and both patient & wife left after PST appt was completed.     I called Donya in Dr Georges [facility] office to clarify about need for medical clearance. Donya stated that surgery was "booked wrong" and it's going to be done with Dr Castro. She stated procedure for mon 5/21 was being cancelled. I mentioned pt & wife were just in for PST appointment & were unaware of this. Donya stated she would notify patient.

## 2018-05-18 NOTE — ASU PATIENT PROFILE, ADULT - PSH
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

## 2018-05-18 NOTE — ASU PATIENT PROFILE, ADULT - PMH
Anxiety    BPH (benign prostatic hyperplasia)    Cigarette smoker    Erosion of urethra due to catheterization of urinary tract, initial encounter    Schreiber catheter in place    HTN (hypertension)    Leg pain, left  "ankle".  Memory deficit    Pedal edema  b/l  Poor historian    Urinary retention

## 2018-05-22 ENCOUNTER — OUTPATIENT (OUTPATIENT)
Dept: OUTPATIENT SERVICES | Facility: HOSPITAL | Age: 83
LOS: 1 days | Discharge: HOME | End: 2018-05-22
Payer: MEDICARE

## 2018-05-22 VITALS
SYSTOLIC BLOOD PRESSURE: 151 MMHG | HEART RATE: 50 BPM | OXYGEN SATURATION: 100 % | TEMPERATURE: 98 F | RESPIRATION RATE: 16 BRPM | DIASTOLIC BLOOD PRESSURE: 69 MMHG

## 2018-05-22 VITALS
WEIGHT: 188.94 LBS | HEART RATE: 56 BPM | SYSTOLIC BLOOD PRESSURE: 115 MMHG | TEMPERATURE: 98 F | RESPIRATION RATE: 16 BRPM | OXYGEN SATURATION: 100 % | HEIGHT: 72 IN | DIASTOLIC BLOOD PRESSURE: 67 MMHG

## 2018-05-22 DIAGNOSIS — Z93.59 OTHER CYSTOSTOMY STATUS: Chronic | ICD-10-CM

## 2018-05-22 DIAGNOSIS — C4A.31 MERKEL CELL CARCINOMA OF NOSE: Chronic | ICD-10-CM

## 2018-05-22 LAB
INR BLD: 1.16 RATIO — SIGNIFICANT CHANGE UP (ref 0.88–1.16)
PROTHROM AB SERPL-ACNC: 12.6 SEC — SIGNIFICANT CHANGE UP (ref 9.8–12.7)

## 2018-05-22 PROCEDURE — 77012 CT SCAN FOR NEEDLE BIOPSY: CPT | Mod: 26

## 2018-05-22 PROCEDURE — 51102 DRAIN BL W/CATH INSERTION: CPT

## 2018-05-22 RX ORDER — FENTANYL CITRATE 50 UG/ML
50 INJECTION INTRAVENOUS
Qty: 0 | Refills: 0 | Status: DISCONTINUED | OUTPATIENT
Start: 2018-05-22 | End: 2018-05-22

## 2018-05-22 RX ORDER — OXYCODONE HYDROCHLORIDE 5 MG/1
5 TABLET ORAL EVERY 4 HOURS
Qty: 0 | Refills: 0 | Status: DISCONTINUED | OUTPATIENT
Start: 2018-05-22 | End: 2018-05-22

## 2018-05-22 RX ORDER — SODIUM CHLORIDE 9 MG/ML
1000 INJECTION INTRAMUSCULAR; INTRAVENOUS; SUBCUTANEOUS
Qty: 0 | Refills: 0 | Status: DISCONTINUED | OUTPATIENT
Start: 2018-05-22 | End: 2018-05-22

## 2018-05-22 RX ORDER — ONDANSETRON 8 MG/1
4 TABLET, FILM COATED ORAL ONCE
Qty: 0 | Refills: 0 | Status: DISCONTINUED | OUTPATIENT
Start: 2018-05-22 | End: 2018-05-22

## 2018-05-22 RX ORDER — ACETAMINOPHEN 500 MG
1000 TABLET ORAL ONCE
Qty: 0 | Refills: 0 | Status: DISCONTINUED | OUTPATIENT
Start: 2018-05-22 | End: 2018-05-22

## 2018-05-22 NOTE — BRIEF OPERATIVE NOTE - PROCEDURE
<<-----Click on this checkbox to enter Procedure Cystostomy, suprapubic, adult  05/22/2018    Active  EDUARDO

## 2018-05-22 NOTE — BRIEF OPERATIVE NOTE - OPERATION/FINDINGS
1) Bladder distended, diverticulum at superior aspect  2) 16F SP pigtail placed in bladder with CT guidance

## 2018-05-31 DIAGNOSIS — I10 ESSENTIAL (PRIMARY) HYPERTENSION: ICD-10-CM

## 2018-05-31 DIAGNOSIS — N32.0 BLADDER-NECK OBSTRUCTION: ICD-10-CM

## 2018-05-31 DIAGNOSIS — F17.200 NICOTINE DEPENDENCE, UNSPECIFIED, UNCOMPLICATED: ICD-10-CM

## 2018-05-31 DIAGNOSIS — N40.1 BENIGN PROSTATIC HYPERPLASIA WITH LOWER URINARY TRACT SYMPTOMS: ICD-10-CM

## 2018-05-31 DIAGNOSIS — R33.9 RETENTION OF URINE, UNSPECIFIED: ICD-10-CM

## 2019-01-09 ENCOUNTER — INPATIENT (INPATIENT)
Facility: HOSPITAL | Age: 84
LOS: 2 days | Discharge: HOME HEALTH | End: 2019-01-12
Attending: INTERNAL MEDICINE | Admitting: INTERNAL MEDICINE
Payer: MEDICARE

## 2019-01-09 VITALS
OXYGEN SATURATION: 97 % | HEIGHT: 72 IN | HEART RATE: 58 BPM | SYSTOLIC BLOOD PRESSURE: 121 MMHG | WEIGHT: 199.96 LBS | RESPIRATION RATE: 22 BRPM | TEMPERATURE: 97 F | DIASTOLIC BLOOD PRESSURE: 62 MMHG

## 2019-01-09 DIAGNOSIS — Z93.59 OTHER CYSTOSTOMY STATUS: Chronic | ICD-10-CM

## 2019-01-09 DIAGNOSIS — C4A.31 MERKEL CELL CARCINOMA OF NOSE: Chronic | ICD-10-CM

## 2019-01-09 PROBLEM — R60.0 LOCALIZED EDEMA: Chronic | Status: ACTIVE | Noted: 2018-05-18

## 2019-01-09 PROBLEM — M79.605 PAIN IN LEFT LEG: Chronic | Status: ACTIVE | Noted: 2018-05-18

## 2019-01-09 PROBLEM — N40.0 BENIGN PROSTATIC HYPERPLASIA WITHOUT LOWER URINARY TRACT SYMPTOMS: Chronic | Status: ACTIVE | Noted: 2018-05-18

## 2019-01-09 PROBLEM — Z92.89 PERSONAL HISTORY OF OTHER MEDICAL TREATMENT: Chronic | Status: ACTIVE | Noted: 2018-05-18

## 2019-01-09 PROBLEM — F17.210 NICOTINE DEPENDENCE, CIGARETTES, UNCOMPLICATED: Chronic | Status: ACTIVE | Noted: 2018-05-18

## 2019-01-09 PROBLEM — Z78.9 OTHER SPECIFIED HEALTH STATUS: Chronic | Status: ACTIVE | Noted: 2018-05-18

## 2019-01-09 PROBLEM — R41.3 OTHER AMNESIA: Chronic | Status: ACTIVE | Noted: 2018-05-18

## 2019-01-09 LAB
ABO RH CONFIRMATION: SIGNIFICANT CHANGE UP
ALBUMIN SERPL ELPH-MCNC: 2.2 G/DL — LOW (ref 3.3–5)
ALBUMIN SERPL ELPH-MCNC: 2.4 G/DL — LOW (ref 3.3–5)
ALP SERPL-CCNC: 78 U/L — SIGNIFICANT CHANGE UP (ref 40–120)
ALT FLD-CCNC: 31 U/L — SIGNIFICANT CHANGE UP (ref 12–78)
ANION GAP SERPL CALC-SCNC: 12 MMOL/L — SIGNIFICANT CHANGE UP (ref 5–17)
ANION GAP SERPL CALC-SCNC: 9 MMOL/L — SIGNIFICANT CHANGE UP (ref 5–17)
ANISOCYTOSIS BLD QL: SLIGHT — SIGNIFICANT CHANGE UP
APPEARANCE UR: ABNORMAL
AST SERPL-CCNC: 23 U/L — SIGNIFICANT CHANGE UP (ref 15–37)
BACTERIA # UR AUTO: ABNORMAL
BASOPHILS # BLD AUTO: 0.01 K/UL — SIGNIFICANT CHANGE UP (ref 0–0.2)
BASOPHILS # BLD AUTO: 0.01 K/UL — SIGNIFICANT CHANGE UP (ref 0–0.2)
BASOPHILS NFR BLD AUTO: 0.2 % — SIGNIFICANT CHANGE UP (ref 0–2)
BASOPHILS NFR BLD AUTO: 0.2 % — SIGNIFICANT CHANGE UP (ref 0–2)
BILIRUB SERPL-MCNC: 0.7 MG/DL — SIGNIFICANT CHANGE UP (ref 0.2–1.2)
BILIRUB UR-MCNC: NEGATIVE — SIGNIFICANT CHANGE UP
BUN SERPL-MCNC: 82 MG/DL — HIGH (ref 7–23)
BUN SERPL-MCNC: 84 MG/DL — HIGH (ref 7–23)
CALCIUM SERPL-MCNC: 8.1 MG/DL — LOW (ref 8.5–10.1)
CALCIUM SERPL-MCNC: 8.5 MG/DL — SIGNIFICANT CHANGE UP (ref 8.5–10.1)
CHLORIDE SERPL-SCNC: 110 MMOL/L — HIGH (ref 96–108)
CHLORIDE SERPL-SCNC: 114 MMOL/L — HIGH (ref 96–108)
CO2 SERPL-SCNC: 18 MMOL/L — LOW (ref 22–31)
CO2 SERPL-SCNC: 20 MMOL/L — LOW (ref 22–31)
COLOR SPEC: ABNORMAL
CREAT SERPL-MCNC: 4.45 MG/DL — HIGH (ref 0.5–1.3)
CREAT SERPL-MCNC: 5.02 MG/DL — HIGH (ref 0.5–1.3)
DIFF PNL FLD: ABNORMAL
ELLIPTOCYTES BLD QL SMEAR: SLIGHT — SIGNIFICANT CHANGE UP
EOSINOPHIL # BLD AUTO: 0.06 K/UL — SIGNIFICANT CHANGE UP (ref 0–0.5)
EOSINOPHIL # BLD AUTO: 0.08 K/UL — SIGNIFICANT CHANGE UP (ref 0–0.5)
EOSINOPHIL NFR BLD AUTO: 0.9 % — SIGNIFICANT CHANGE UP (ref 0–6)
EOSINOPHIL NFR BLD AUTO: 1.4 % — SIGNIFICANT CHANGE UP (ref 0–6)
EPI CELLS # UR: SIGNIFICANT CHANGE UP
GLUCOSE SERPL-MCNC: 113 MG/DL — HIGH (ref 70–99)
GLUCOSE SERPL-MCNC: 117 MG/DL — HIGH (ref 70–99)
GLUCOSE UR QL: NEGATIVE MG/DL — SIGNIFICANT CHANGE UP
HCT VFR BLD CALC: 29.9 % — LOW (ref 39–50)
HCT VFR BLD CALC: 31 % — LOW (ref 39–50)
HGB BLD-MCNC: 10 G/DL — LOW (ref 13–17)
HGB BLD-MCNC: 9.5 G/DL — LOW (ref 13–17)
IMM GRANULOCYTES NFR BLD AUTO: 0.6 % — SIGNIFICANT CHANGE UP (ref 0–1.5)
IMM GRANULOCYTES NFR BLD AUTO: 0.7 % — SIGNIFICANT CHANGE UP (ref 0–1.5)
KETONES UR-MCNC: ABNORMAL
LACTATE SERPL-SCNC: 0.9 MMOL/L — SIGNIFICANT CHANGE UP (ref 0.7–2)
LEUKOCYTE ESTERASE UR-ACNC: ABNORMAL
LYMPHOCYTES # BLD AUTO: 1.31 K/UL — SIGNIFICANT CHANGE UP (ref 1–3.3)
LYMPHOCYTES # BLD AUTO: 1.45 K/UL — SIGNIFICANT CHANGE UP (ref 1–3.3)
LYMPHOCYTES # BLD AUTO: 20.6 % — SIGNIFICANT CHANGE UP (ref 13–44)
LYMPHOCYTES # BLD AUTO: 25.2 % — SIGNIFICANT CHANGE UP (ref 13–44)
MACROCYTES BLD QL: SLIGHT — SIGNIFICANT CHANGE UP
MAGNESIUM SERPL-MCNC: 2.2 MG/DL — SIGNIFICANT CHANGE UP (ref 1.6–2.6)
MANUAL SMEAR VERIFICATION: SIGNIFICANT CHANGE UP
MCHC RBC-ENTMCNC: 29.9 PG — SIGNIFICANT CHANGE UP (ref 27–34)
MCHC RBC-ENTMCNC: 30.6 PG — SIGNIFICANT CHANGE UP (ref 27–34)
MCHC RBC-ENTMCNC: 31.8 GM/DL — LOW (ref 32–36)
MCHC RBC-ENTMCNC: 32.3 GM/DL — SIGNIFICANT CHANGE UP (ref 32–36)
MCV RBC AUTO: 94 FL — SIGNIFICANT CHANGE UP (ref 80–100)
MCV RBC AUTO: 94.8 FL — SIGNIFICANT CHANGE UP (ref 80–100)
MONOCYTES # BLD AUTO: 0.59 K/UL — SIGNIFICANT CHANGE UP (ref 0–0.9)
MONOCYTES # BLD AUTO: 0.61 K/UL — SIGNIFICANT CHANGE UP (ref 0–0.9)
MONOCYTES NFR BLD AUTO: 10.6 % — SIGNIFICANT CHANGE UP (ref 2–14)
MONOCYTES NFR BLD AUTO: 9.3 % — SIGNIFICANT CHANGE UP (ref 2–14)
NEUTROPHILS # BLD AUTO: 3.57 K/UL — SIGNIFICANT CHANGE UP (ref 1.8–7.4)
NEUTROPHILS # BLD AUTO: 4.34 K/UL — SIGNIFICANT CHANGE UP (ref 1.8–7.4)
NEUTROPHILS NFR BLD AUTO: 61.9 % — SIGNIFICANT CHANGE UP (ref 43–77)
NEUTROPHILS NFR BLD AUTO: 68.4 % — SIGNIFICANT CHANGE UP (ref 43–77)
NITRITE UR-MCNC: POSITIVE
NRBC # BLD: 0 /100 WBCS — SIGNIFICANT CHANGE UP (ref 0–0)
NRBC # BLD: 0 /100 WBCS — SIGNIFICANT CHANGE UP (ref 0–0)
PH UR: 5 — SIGNIFICANT CHANGE UP (ref 5–8)
PHOSPHATE SERPL-MCNC: 4.6 MG/DL — HIGH (ref 2.5–4.5)
PHOSPHATE SERPL-MCNC: 5.1 MG/DL — HIGH (ref 2.5–4.5)
PLAT MORPH BLD: NORMAL — SIGNIFICANT CHANGE UP
PLATELET # BLD AUTO: 107 K/UL — LOW (ref 150–400)
PLATELET # BLD AUTO: 99 K/UL — LOW (ref 150–400)
POIKILOCYTOSIS BLD QL AUTO: SLIGHT — SIGNIFICANT CHANGE UP
POLYCHROMASIA BLD QL SMEAR: SLIGHT — SIGNIFICANT CHANGE UP
POTASSIUM SERPL-MCNC: 4.5 MMOL/L — SIGNIFICANT CHANGE UP (ref 3.5–5.3)
POTASSIUM SERPL-MCNC: 4.6 MMOL/L — SIGNIFICANT CHANGE UP (ref 3.5–5.3)
POTASSIUM SERPL-SCNC: 4.5 MMOL/L — SIGNIFICANT CHANGE UP (ref 3.5–5.3)
POTASSIUM SERPL-SCNC: 4.6 MMOL/L — SIGNIFICANT CHANGE UP (ref 3.5–5.3)
PROT SERPL-MCNC: 6.3 GM/DL — SIGNIFICANT CHANGE UP (ref 6–8.3)
PROT UR-MCNC: 100 MG/DL
RBC # BLD: 3.18 M/UL — LOW (ref 4.2–5.8)
RBC # BLD: 3.27 M/UL — LOW (ref 4.2–5.8)
RBC # FLD: 14.5 % — SIGNIFICANT CHANGE UP (ref 10.3–14.5)
RBC # FLD: 14.5 % — SIGNIFICANT CHANGE UP (ref 10.3–14.5)
RBC BLD AUTO: ABNORMAL
RBC CASTS # UR COMP ASSIST: >50 /HPF (ref 0–4)
SODIUM SERPL-SCNC: 139 MMOL/L — SIGNIFICANT CHANGE UP (ref 135–145)
SODIUM SERPL-SCNC: 144 MMOL/L — SIGNIFICANT CHANGE UP (ref 135–145)
SP GR SPEC: 1.01 — SIGNIFICANT CHANGE UP (ref 1.01–1.02)
TROPONIN I SERPL-MCNC: <0.015 NG/ML — SIGNIFICANT CHANGE UP (ref 0.01–0.04)
TROPONIN I SERPL-MCNC: <0.015 NG/ML — SIGNIFICANT CHANGE UP (ref 0.01–0.04)
UROBILINOGEN FLD QL: NEGATIVE MG/DL — SIGNIFICANT CHANGE UP
WBC # BLD: 5.76 K/UL — SIGNIFICANT CHANGE UP (ref 3.8–10.5)
WBC # BLD: 6.35 K/UL — SIGNIFICANT CHANGE UP (ref 3.8–10.5)
WBC # FLD AUTO: 5.76 K/UL — SIGNIFICANT CHANGE UP (ref 3.8–10.5)
WBC # FLD AUTO: 6.35 K/UL — SIGNIFICANT CHANGE UP (ref 3.8–10.5)
WBC UR QL: >50

## 2019-01-09 PROCEDURE — 99285 EMERGENCY DEPT VISIT HI MDM: CPT

## 2019-01-09 PROCEDURE — 93010 ELECTROCARDIOGRAM REPORT: CPT | Mod: 76

## 2019-01-09 PROCEDURE — 70450 CT HEAD/BRAIN W/O DYE: CPT | Mod: 26

## 2019-01-09 PROCEDURE — 71045 X-RAY EXAM CHEST 1 VIEW: CPT | Mod: 26

## 2019-01-09 PROCEDURE — 74176 CT ABD & PELVIS W/O CONTRAST: CPT | Mod: 26

## 2019-01-09 RX ORDER — METOPROLOL TARTRATE 50 MG
100 TABLET ORAL DAILY
Qty: 0 | Refills: 0 | Status: DISCONTINUED | OUTPATIENT
Start: 2019-01-09 | End: 2019-01-12

## 2019-01-09 RX ORDER — DOCUSATE SODIUM 100 MG
100 CAPSULE ORAL THREE TIMES A DAY
Qty: 0 | Refills: 0 | Status: DISCONTINUED | OUTPATIENT
Start: 2019-01-09 | End: 2019-01-12

## 2019-01-09 RX ORDER — SODIUM CHLORIDE 9 MG/ML
1000 INJECTION INTRAMUSCULAR; INTRAVENOUS; SUBCUTANEOUS ONCE
Qty: 0 | Refills: 0 | Status: COMPLETED | OUTPATIENT
Start: 2019-01-09 | End: 2019-01-09

## 2019-01-09 RX ORDER — CEFTRIAXONE 500 MG/1
INJECTION, POWDER, FOR SOLUTION INTRAMUSCULAR; INTRAVENOUS
Qty: 0 | Refills: 0 | Status: DISCONTINUED | OUTPATIENT
Start: 2019-01-09 | End: 2019-01-09

## 2019-01-09 RX ORDER — CEFTRIAXONE 500 MG/1
1000 INJECTION, POWDER, FOR SOLUTION INTRAMUSCULAR; INTRAVENOUS ONCE
Qty: 0 | Refills: 0 | Status: COMPLETED | OUTPATIENT
Start: 2019-01-09 | End: 2019-01-09

## 2019-01-09 RX ORDER — TAMSULOSIN HYDROCHLORIDE 0.4 MG/1
0.4 CAPSULE ORAL AT BEDTIME
Qty: 0 | Refills: 0 | Status: DISCONTINUED | OUTPATIENT
Start: 2019-01-09 | End: 2019-01-12

## 2019-01-09 RX ORDER — SENNA PLUS 8.6 MG/1
2 TABLET ORAL AT BEDTIME
Qty: 0 | Refills: 0 | Status: DISCONTINUED | OUTPATIENT
Start: 2019-01-09 | End: 2019-01-12

## 2019-01-09 RX ORDER — CEFTRIAXONE 500 MG/1
1000 INJECTION, POWDER, FOR SOLUTION INTRAMUSCULAR; INTRAVENOUS EVERY 24 HOURS
Qty: 0 | Refills: 0 | Status: DISCONTINUED | OUTPATIENT
Start: 2019-01-10 | End: 2019-01-12

## 2019-01-09 RX ORDER — ACETAMINOPHEN 500 MG
650 TABLET ORAL EVERY 6 HOURS
Qty: 0 | Refills: 0 | Status: DISCONTINUED | OUTPATIENT
Start: 2019-01-09 | End: 2019-01-12

## 2019-01-09 RX ORDER — TAMSULOSIN HYDROCHLORIDE 0.4 MG/1
1 CAPSULE ORAL
Qty: 0 | Refills: 0 | COMMUNITY

## 2019-01-09 RX ORDER — HYDRALAZINE HCL 50 MG
5 TABLET ORAL EVERY 6 HOURS
Qty: 0 | Refills: 0 | Status: DISCONTINUED | OUTPATIENT
Start: 2019-01-09 | End: 2019-01-10

## 2019-01-09 RX ORDER — IPRATROPIUM/ALBUTEROL SULFATE 18-103MCG
3 AEROSOL WITH ADAPTER (GRAM) INHALATION EVERY 6 HOURS
Qty: 0 | Refills: 0 | Status: DISCONTINUED | OUTPATIENT
Start: 2019-01-09 | End: 2019-01-12

## 2019-01-09 RX ORDER — ONDANSETRON 8 MG/1
4 TABLET, FILM COATED ORAL EVERY 6 HOURS
Qty: 0 | Refills: 0 | Status: DISCONTINUED | OUTPATIENT
Start: 2019-01-09 | End: 2019-01-12

## 2019-01-09 RX ORDER — CEFTRIAXONE 500 MG/1
INJECTION, POWDER, FOR SOLUTION INTRAMUSCULAR; INTRAVENOUS
Qty: 0 | Refills: 0 | Status: DISCONTINUED | OUTPATIENT
Start: 2019-01-09 | End: 2019-01-12

## 2019-01-09 RX ORDER — FINASTERIDE 5 MG/1
5 TABLET, FILM COATED ORAL DAILY
Qty: 0 | Refills: 0 | Status: DISCONTINUED | OUTPATIENT
Start: 2019-01-09 | End: 2019-01-12

## 2019-01-09 RX ORDER — LOPERAMIDE HCL 2 MG
2 TABLET ORAL EVERY 6 HOURS
Qty: 0 | Refills: 0 | Status: DISCONTINUED | OUTPATIENT
Start: 2019-01-09 | End: 2019-01-12

## 2019-01-09 RX ORDER — SODIUM CHLORIDE 9 MG/ML
1000 INJECTION INTRAMUSCULAR; INTRAVENOUS; SUBCUTANEOUS
Qty: 0 | Refills: 0 | Status: DISCONTINUED | OUTPATIENT
Start: 2019-01-09 | End: 2019-01-10

## 2019-01-09 RX ADMIN — SODIUM CHLORIDE 2000 MILLILITER(S): 9 INJECTION INTRAMUSCULAR; INTRAVENOUS; SUBCUTANEOUS at 10:29

## 2019-01-09 RX ADMIN — Medication 225 MILLIGRAM(S): at 20:47

## 2019-01-09 RX ADMIN — SODIUM CHLORIDE 1000 MILLILITER(S): 9 INJECTION INTRAMUSCULAR; INTRAVENOUS; SUBCUTANEOUS at 11:12

## 2019-01-09 RX ADMIN — TAMSULOSIN HYDROCHLORIDE 0.4 MILLIGRAM(S): 0.4 CAPSULE ORAL at 22:28

## 2019-01-09 RX ADMIN — SODIUM CHLORIDE 1000 MILLILITER(S): 9 INJECTION INTRAMUSCULAR; INTRAVENOUS; SUBCUTANEOUS at 12:12

## 2019-01-09 RX ADMIN — SODIUM CHLORIDE 1000 MILLILITER(S): 9 INJECTION INTRAMUSCULAR; INTRAVENOUS; SUBCUTANEOUS at 11:36

## 2019-01-09 RX ADMIN — CEFTRIAXONE 1000 MILLIGRAM(S): 500 INJECTION, POWDER, FOR SOLUTION INTRAMUSCULAR; INTRAVENOUS at 15:15

## 2019-01-09 RX ADMIN — SODIUM CHLORIDE 75 MILLILITER(S): 9 INJECTION INTRAMUSCULAR; INTRAVENOUS; SUBCUTANEOUS at 15:14

## 2019-01-09 RX ADMIN — SODIUM CHLORIDE 1000 MILLILITER(S): 9 INJECTION INTRAMUSCULAR; INTRAVENOUS; SUBCUTANEOUS at 13:36

## 2019-01-09 RX ADMIN — SODIUM CHLORIDE 1000 MILLILITER(S): 9 INJECTION INTRAMUSCULAR; INTRAVENOUS; SUBCUTANEOUS at 14:45

## 2019-01-09 NOTE — H&P ADULT - ASSESSMENT
Pt is 82 yo male with PMH as per HPI here with acute on chronic renal failure    # ARF - baseline cr ~2, likely prerenal due to decreased po intake given diarrhea  - renal consult appreciated  - s/p 3 liter fluid bolus and romero catheter changed  - strict I/os  - check BMp in AM  - renal dose meds and hold ARB    # weakness/metabolic encephalopathy 2/2 ARF/diarrhea   - supportive care  - IVF  - PT eval      # tob use, chronic cough  - urged pt o quit  - nebs prn  - mucinex  - smoking cessation    # hematuria  - has indwelling romero cath which was changed and traumatic change as per ED physician  - urology eval with dr escamilla who is his outpt doc   - hold DVT proph for now and use IPCs  - monitor CBC    # DVT proph  - IPC.  start heparin once hematuria resolves    Advanced directives  - spend 15 min  - FULL code Pt is 82 yo male with PMH as per HPI here with acute on chronic renal failure    # ARF/weakness/metabolic encephalopathy 2/2 ARF/diarrhea  - baseline cr ~2, likely prerenal due to decreased po intake given diarrhea  - ct abd - with perinephric stranding no hydro ? pyelo  - f/u ucx  - continue ceftriaxone  - renal consult appreciated  - s/p 3 liter fluid bolus and romero catheter changed  - strict I/os  - check BMP in AM  - renal dose meds and hold ARB  - supportive care  - IVF  - PT eval    # tob use, chronic cough  - urged pt o quit  - nebs prn  - mucinex  - smoking cessation    # hematuria  - has indwelling romero cath which was changed and traumatic change as per ED physician  - urology eval with dr escamilla who is his outpt doc   - hold DVT proph for now and use IPCs  - monitor CBC    # BPH - continue flomax    # HTN - hold losartan  - continue toprol Xl with parameters  - hydaralzine prn     # DVT proph  - IPC.  start heparin once hematuria resolves    Advanced directives  - spend 15 min  - FULL code

## 2019-01-09 NOTE — ED ADULT NURSE REASSESSMENT NOTE - NS ED NURSE REASSESS COMMENT FT1
Dr Jose made aware that no blood cultures were order and antibiotics were. MD stated they were not needed and to give antibiotics.

## 2019-01-09 NOTE — ED PROVIDER NOTE - MEDICAL DECISION MAKING DETAILS
Plan for CT head r/o CVA, pt is well outside window for intervention as sx lasting for 3 days, will workup for infectious vs. metabolic vs. ischemia origin.

## 2019-01-09 NOTE — H&P ADULT - HISTORY OF PRESENT ILLNESS
Pt is an 84 y/o gentleman with a PMHx of HTN, BPH, anxiety, tob use and likely early dementia as per family although not formally diagnoseduntil last year sent in for worsening weakness, tremors and confusion with diarrhea for past 2 days.  As per family who is at bedside similar confusion and tremors when he had admission for flu and renal failure but no fever or chills this admission.  pt has indwelling suprapubic romero whichw as changed by ED physician today.  As per wife the diarrhea watery and about 4 episodes per day for last couple of days with decreased po intake.  AMS with decreased urine output with hematuria in romero bag only after romero catheter change today.             PMed/Surg Hx   HTN,    BPH,   Tobacco Dependance 60 pack years  Bronchitis/Likely COPD  anxiety   Neuropathy  Skin cancer lesions removed from face and back - 5 procedures   mild dementia    Fam Hx:  Non-contrib to current adm

## 2019-01-09 NOTE — H&P ADULT - NSHPPHYSICALEXAM_GEN_ALL_CORE
Vital Signs Last 24 Hrs  T(C): 36.5 (09 Jan 2019 15:11), Max: 36.5 (09 Jan 2019 15:11)  T(F): 97.7 (09 Jan 2019 15:11), Max: 97.7 (09 Jan 2019 15:11)  HR: 53 (09 Jan 2019 15:11) (53 - 58)  BP: 124/100 (09 Jan 2019 15:11) (121/62 - 148/119)  BP(mean): --  RR: 18 (09 Jan 2019 15:11) (18 - 22)  SpO2: 98% (09 Jan 2019 15:11) (97% - 98%)    GEN: lying in bed, NAD  HEENT:   NC/AT,EOMI  CV:  +S1, +S2, RRR  RESP:   few scattered rhonchi   GI:  abdomen soft, non-tender, non-distended, normoactive BS  :  suprapubic romero draining bloody urine with a few clots   MSK:   normal muscle tone  EXT:  + edema   NEURO:  AAOX1, no focal neurological deficits  SKIN:  no rashes

## 2019-01-09 NOTE — PROVIDER CONTACT NOTE (OTHER) - SITUATION
s/w Kim from [facility] service s/w Kim from answering service    @21:00 s/w Dr. Georges he said that he has information on the patient and is aware of what is going on.

## 2019-01-09 NOTE — CONSULT NOTE ADULT - ASSESSMENT
84 yo male with HT, Chronic FORRESTER w SUprapubic catheter, w hx of poor po intake and now diarrhea presenting wieth weakness and falls     ZELDA on CKD 3 Cr base 1.5    Prerennal azotemia due  to vollume depletion   - check urine lytes  - Hold ARB  - avoid NSAID  - recieved 3 Liters of IVF in ED - give gentle IVF   - monitor Renal trend  - strict i and o's   - no acute indication for HD   - Ct abd - no hdyro , + renal atrophy mild , + perinephric stranding - r/o Pyelo ?? - may need abx - await UA  - for admission per Medicine     d.w Dr Jose, wife and daughter at bedside    Thank you for the courtesy of this consult. We will follow this patient with you.   Management is subject to change if new information becomes available or patient condition changes.

## 2019-01-09 NOTE — CONSULT NOTE ADULT - SUBJECTIVE AND OBJECTIVE BOX
t: 82 y/o male with a PMHx of BPH, HTN, neuropathy presents to the ED c/o left sided weakness x3 days. Pt uses walker at baseline. Denies fevers, chills, body aches, CP, abd pain. Pt also reports associated cough. As per family, pt has been c/o generalized pain with difficulty ambulating and speaking x3 days. Family reports pt leaning to left side. +fatigue. Suprapubic catheter placed 1/29/18 after being dx with the flu and PNA. Last flush 2 weeks ago. Urologist Dr. Harrison. Current smoker. PCP Dr. Miller 	  · Presenting Symptoms: +weakness +difficulty ambulating and speaking +fatigue	      Renal eval called for ZELDA w Cr 5 - prev Cr 1.5 in past - seen earlier today ~ 11;30 am    now per family pt has not been eating or drinking for 3 days during [place] - when he returned he had diarrhea past 3 days as well    takes losartan for BP    no NSAID or diuretics use   pt poor historian - c/o weakness    no sob or cp    recieved 3 Liters of fluid in ED     PAST MEDICAL & SURGICAL HISTORY:  Leg pain, left: &quot;ankle&quot;.  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 1000 milliLiter(s) (75 mL/Hr) IV Continuous <Continuous>      Allergies    No Known Allergies    Intolerances        SOCIAL HISTORY:  Denies ETOh,Smoking,     FAMILY HISTORY:  Not contributory     REVIEW OF SYSTEMS:    CONSTITUTIONAL: No fevers or chills  EYES/ENT: No visual changes;  No vertigo or throat pain   NECK: No pain or stiffness  RESPIRATORY: No cough, wheezing, hemoptysis; No shortness of breath  CARDIOVASCULAR: No chest pain or palpitations  GASTROINTESTINAL: No abdominal or epigastric pain. No nausea, vomiting, or hematemesis; No diarrhea or constipation. No melena or hematochezia.  GENITOURINARY: No dysuria, frequency or hematuria  NEUROLOGICAL: No numbness or weakness  SKIN: No itching, burning, rashes, or lesions   All other review of systems is negative unless indicated above.    Vital Signs Last 24 Hrs  T(C): 36.2 (09 Jan 2019 09:58), Max: 36.2 (09 Jan 2019 09:58)  T(F): 97.1 (09 Jan 2019 09:58), Max: 97.1 (09 Jan 2019 09:58)  HR: 57 (09 Jan 2019 13:27) (57 - 58)  BP: 148/119 (09 Jan 2019 13:27) (121/62 - 148/119)  BP(mean): --  RR: 20 (09 Jan 2019 13:27) (20 - 22)  SpO2: 97% (09 Jan 2019 13:27) (97% - 97%)      PHYSICAL EXAM:    Constitutional: NAD  HEENT: PERRLA, EOMI,  MM = Dry   Neck: No LAD, No JVD  Respiratory: CTAB  Cardiovascular: S1 and S2  Gastrointestinal: BS+, soft, NT/ND  Extremities: No peripheral edema  Neurological: A/O x 3,  Psychiatric: Normal mood, normal affect  : Romero + SPT w dark urine   Skin: No rashes  Access: Not applicable    LABS:                        10.0   5.76  )-----------( 99       ( 09 Jan 2019 10:13 )             31.0     01-09    139  |  110<H>  |  82<H>  ----------------------------<  117<H>  4.6   |  20<L>  |  5.02<H>    Ca    8.5      09 Jan 2019 10:13  Phos  5.1     01-09  Mg     2.2     01-09    TPro  6.3  /  Alb  2.4<L>  /  TBili  0.7  /  DBili  x   /  AST  23  /  ALT  31  /  AlkPhos  78  01-09      Urine Studies:          RADIOLOGY & ADDITIONAL STUDIES:      LOWER LUNGS AND PLEURA: Coronary vascular calcification. Patchy opacities   and nodularity in the left lower lobe may represent atelectasis and or   pneumonia. Trace left pleural effusion and/or pleural thickening.    LIVER: within normal limits.  BILE DUCTS: normal caliber.  GALLBLADDER: Small gallstones and/or gallbladder sludge.    PANCREAS: within normal limits.  SPLEEN: within normal limits.  ADRENALS: within normal limits.    KIDNEYS, URETERS AND BLADDER: Mild bilateral renal atrophy. No   hydronephrosis. Bilateral perinephric stranding and trace bowel   perinephric fluid. Left lateral midpole renal cyst. No intrarenal   calculus. The ureters are unremarkable. A suprapubic catheter is   identified with catheter tip and catheter balloon is seen within the mid   canal urethra and needs to be repositioned. The bladder is collapsed.    PELVIS: no pelvic masses.No pelvic lymphadenopathy.    BOWEL: The unopacified bowel is of normal course and caliber without   evidence of obstruction or bowel wall thickening. A normal appendix is   visualized. Colonic diverticulosis without diverticulitis.    PERITONEUM: no ascites or free air, no fluid collection.  RETROPERITONEUM: within normal limits.      VESSELS: atherosclerotic changes.      ABDOMINAL WALL: Minimal anasarca.  BONES: Degenerative changes.     IMPRESSION:     A suprapubic catheter is identified with catheter tip and catheter   balloon is seen within the mid canal urethra and needs to be repositioned    No hydronephrosis. Nonspecific bilateral perinephric stranding.

## 2019-01-09 NOTE — ED ADULT NURSE REASSESSMENT NOTE - NS ED NURSE REASSESS COMMENT FT1
Dr Jose made aware no urine output since catheter has been changed.  Additional NS bolus infusing. Will continue to monitor.

## 2019-01-09 NOTE — ED ADULT NURSE NOTE - NSIMPLEMENTINTERV_GEN_ALL_ED
Implemented All Fall Risk Interventions:  [place] to call system. Call bell, personal items and telephone within reach. Instruct patient to call for assistance. Room bathroom lighting operational. Non-slip footwear when patient is off stretcher. Physically safe environment: no spills, clutter or unnecessary equipment. Stretcher in lowest position, wheels locked, appropriate side rails in place. Provide visual cue, wrist band, yellow gown, etc. Monitor gait and stability. Monitor for mental status changes and reorient to person, place, and time. Review medications for side effects contributing to fall risk. Reinforce activity limits and safety measures with patient and family.

## 2019-01-09 NOTE — ED PROVIDER NOTE - OBJECTIVE STATEMENT
84 y/o male with a PMHx of BPH, HTN, neuropathy presents to the ED c/o left sided weakness x3 days. Pt uses walker at baseline. Denies fevers, chills, body aches, CP, abd pain. Pt also reports associated cough. As per family, pt has been c/o generalized pain with difficulty ambulating and speaking x3 days. Family reports pt leaning to left side. +fatigue. Suprapubic catheter placed 1/29/18 after being dx with the flu and PNA. Last flush 2 weeks ago. Urologist Dr. Harrison. Current smoker. PCP Dr. Miller

## 2019-01-09 NOTE — H&P ADULT - NSHPLABSRESULTS_GEN_ALL_CORE
10.0   5.76  )-----------( 99       ( 2019 10:13 )             31.0         144  |  114<H>  |  84<H>  ----------------------------<  113<H>  4.5   |  18<L>  |  4.45<H>    Ca    8.1<L>      2019 15:46  Phos  4.6       Mg     2.2         TPro  x   /  Alb  2.2<L>  /  TBili  x   /  DBili  x   /  AST  x   /  ALT  x   /  AlkPhos  x       CARDIAC MARKERS ( 2019 13:01 )  <0.015 ng/mL / x     / x     / x     / x      CARDIAC MARKERS ( 2019 10:13 )  <0.015 ng/mL / x     / x     / x     / x          LIVER FUNCTIONS - ( 2019 15:46 )  Alb: 2.2 g/dL / Pro: x     / ALK PHOS: x     / ALT: x     / AST: x     / GGT: x             Urinalysis Basic - ( 2019 10:20 )    Color: Red / Appearance: bloody / S.015 / pH: x  Gluc: x / Ketone: Trace  / Bili: Negative / Urobili: Negative mg/dL   Blood: x / Protein: 100 mg/dL / Nitrite: Positive   Leuk Esterase: Moderate / RBC: >50 /HPF / WBC >50   Sq Epi: x / Non Sq Epi: Occasional / Bacteria: Occasional        Lactate, Blood: 0.9 mmol/L ( @ 13:01)

## 2019-01-09 NOTE — ED ADULT NURSE REASSESSMENT NOTE - NS ED NURSE REASSESS COMMENT FT1
Dr Jose made aware that pt's hr drop to high 40's and repeat ekg done, pt is asymptomatic. Will continue to monitor.

## 2019-01-09 NOTE — ED PROVIDER NOTE - PROGRESS NOTE DETAILS
Fabiana Jose: Pt with creatinine of 5.02, significant increase from baseline. Will give another liter of fluid.

## 2019-01-09 NOTE — ED ADULT NURSE REASSESSMENT NOTE - NS ED NURSE REASSESS COMMENT FT1
Pt has super-pubic tube that is red and crusting at site. Dr. Jose made aware and will change catheter.

## 2019-01-09 NOTE — H&P ADULT - NSHPSOCIALHISTORY_GEN_ALL_CORE
Pt is retired from working with [employer].  He lives with his wife. Currently smokes 4-5 cig/day.  60 pack year smoking.  Pt and family report 1 glass of wine/week.  No drug use.

## 2019-01-09 NOTE — ED PROVIDER NOTE - GASTROINTESTINAL, MLM
Abdomen soft, non-tender, no guarding. +suprapubic catheter in place, no erythema or induration, draining dark yellow urine.

## 2019-01-10 LAB
% ALBUMIN: 49 % — SIGNIFICANT CHANGE UP
% ALPHA 1: 8 % — SIGNIFICANT CHANGE UP
% ALPHA 2: 15.9 % — SIGNIFICANT CHANGE UP
% BETA: 12.6 % — SIGNIFICANT CHANGE UP
% GAMMA: 14.5 % — SIGNIFICANT CHANGE UP
ALBUMIN SERPL ELPH-MCNC: 2.1 G/DL — LOW (ref 3.3–5)
ALBUMIN SERPL ELPH-MCNC: 2.9 G/DL — LOW (ref 3.6–5.5)
ALBUMIN/GLOB SERPL ELPH: 1 RATIO — SIGNIFICANT CHANGE UP
ALP SERPL-CCNC: 72 U/L — SIGNIFICANT CHANGE UP (ref 40–120)
ALPHA1 GLOB SERPL ELPH-MCNC: 0.5 G/DL — HIGH (ref 0.1–0.4)
ALPHA2 GLOB SERPL ELPH-MCNC: 0.9 G/DL — SIGNIFICANT CHANGE UP (ref 0.5–1)
ALT FLD-CCNC: 24 U/L — SIGNIFICANT CHANGE UP (ref 12–78)
ANION GAP SERPL CALC-SCNC: 10 MMOL/L — SIGNIFICANT CHANGE UP (ref 5–17)
AST SERPL-CCNC: 16 U/L — SIGNIFICANT CHANGE UP (ref 15–37)
B-GLOBULIN SERPL ELPH-MCNC: 0.7 G/DL — SIGNIFICANT CHANGE UP (ref 0.5–1)
BILIRUB SERPL-MCNC: 0.6 MG/DL — SIGNIFICANT CHANGE UP (ref 0.2–1.2)
BUN SERPL-MCNC: 73 MG/DL — HIGH (ref 7–23)
CALCIUM SERPL-MCNC: 8.3 MG/DL — LOW (ref 8.5–10.1)
CHLORIDE SERPL-SCNC: 118 MMOL/L — HIGH (ref 96–108)
CO2 SERPL-SCNC: 18 MMOL/L — LOW (ref 22–31)
CREAT ?TM UR-MCNC: 58 MG/DL — SIGNIFICANT CHANGE UP
CREAT SERPL-MCNC: 4.22 MG/DL — HIGH (ref 0.5–1.3)
CULTURE RESULTS: SIGNIFICANT CHANGE UP
FERRITIN SERPL-MCNC: 456 NG/ML — HIGH (ref 30–400)
GAMMA GLOBULIN: 0.9 G/DL — SIGNIFICANT CHANGE UP (ref 0.6–1.6)
GLUCOSE SERPL-MCNC: 94 MG/DL — SIGNIFICANT CHANGE UP (ref 70–99)
HCT VFR BLD CALC: 28.9 % — LOW (ref 39–50)
HGB BLD-MCNC: 9.3 G/DL — LOW (ref 13–17)
INTERPRETATION SERPL IFE-IMP: SIGNIFICANT CHANGE UP
IRON SATN MFR SERPL: 21 UG/DL — LOW (ref 45–165)
MCHC RBC-ENTMCNC: 30.6 PG — SIGNIFICANT CHANGE UP (ref 27–34)
MCHC RBC-ENTMCNC: 32.2 GM/DL — SIGNIFICANT CHANGE UP (ref 32–36)
MCV RBC AUTO: 95.1 FL — SIGNIFICANT CHANGE UP (ref 80–100)
NRBC # BLD: 0 /100 WBCS — SIGNIFICANT CHANGE UP (ref 0–0)
PHOSPHATE SERPL-MCNC: 4.8 MG/DL — HIGH (ref 2.5–4.5)
PLATELET # BLD AUTO: 126 K/UL — LOW (ref 150–400)
POTASSIUM SERPL-MCNC: 4.7 MMOL/L — SIGNIFICANT CHANGE UP (ref 3.5–5.3)
POTASSIUM SERPL-SCNC: 4.7 MMOL/L — SIGNIFICANT CHANGE UP (ref 3.5–5.3)
PROT ?TM UR-MCNC: 105 MG/DL — HIGH (ref 0–12)
PROT PATTERN SERPL ELPH-IMP: SIGNIFICANT CHANGE UP
PROT SERPL-MCNC: 5.9 G/DL — LOW (ref 6–8.3)
PROT SERPL-MCNC: 5.9 GM/DL — LOW (ref 6–8.3)
RBC # BLD: 3.04 M/UL — LOW (ref 4.2–5.8)
RBC # BLD: 3.04 M/UL — LOW (ref 4.2–5.8)
RBC # FLD: 14.4 % — SIGNIFICANT CHANGE UP (ref 10.3–14.5)
RETICS #: 24.6 K/UL — LOW (ref 25–125)
RETICS/RBC NFR: 0.8 % — SIGNIFICANT CHANGE UP (ref 0.5–2.5)
SODIUM SERPL-SCNC: 146 MMOL/L — HIGH (ref 135–145)
SODIUM UR-SCNC: 82 MMOL/L — SIGNIFICANT CHANGE UP
SPECIMEN SOURCE: SIGNIFICANT CHANGE UP
WBC # BLD: 6.63 K/UL — SIGNIFICANT CHANGE UP (ref 3.8–10.5)
WBC # FLD AUTO: 6.63 K/UL — SIGNIFICANT CHANGE UP (ref 3.8–10.5)

## 2019-01-10 RX ORDER — SODIUM CHLORIDE 9 MG/ML
1000 INJECTION INTRAMUSCULAR; INTRAVENOUS; SUBCUTANEOUS
Qty: 0 | Refills: 0 | Status: DISCONTINUED | OUTPATIENT
Start: 2019-01-10 | End: 2019-01-10

## 2019-01-10 RX ORDER — SODIUM CHLORIDE 9 MG/ML
1000 INJECTION, SOLUTION INTRAVENOUS
Qty: 0 | Refills: 0 | Status: DISCONTINUED | OUTPATIENT
Start: 2019-01-10 | End: 2019-01-12

## 2019-01-10 RX ORDER — HEPARIN SODIUM 5000 [USP'U]/ML
5000 INJECTION INTRAVENOUS; SUBCUTANEOUS EVERY 8 HOURS
Qty: 0 | Refills: 0 | Status: DISCONTINUED | OUTPATIENT
Start: 2019-01-10 | End: 2019-01-12

## 2019-01-10 RX ADMIN — CEFTRIAXONE 1000 MILLIGRAM(S): 500 INJECTION, POWDER, FOR SOLUTION INTRAMUSCULAR; INTRAVENOUS at 17:30

## 2019-01-10 RX ADMIN — Medication 3 MILLILITER(S): at 21:00

## 2019-01-10 RX ADMIN — Medication 600 MILLIGRAM(S): at 06:18

## 2019-01-10 RX ADMIN — Medication 3 MILLILITER(S): at 01:46

## 2019-01-10 RX ADMIN — FINASTERIDE 5 MILLIGRAM(S): 5 TABLET, FILM COATED ORAL at 11:01

## 2019-01-10 RX ADMIN — SODIUM CHLORIDE 75 MILLILITER(S): 9 INJECTION INTRAMUSCULAR; INTRAVENOUS; SUBCUTANEOUS at 11:01

## 2019-01-10 RX ADMIN — TAMSULOSIN HYDROCHLORIDE 0.4 MILLIGRAM(S): 0.4 CAPSULE ORAL at 22:53

## 2019-01-10 RX ADMIN — HEPARIN SODIUM 5000 UNIT(S): 5000 INJECTION INTRAVENOUS; SUBCUTANEOUS at 13:53

## 2019-01-10 RX ADMIN — Medication 225 MILLIGRAM(S): at 06:23

## 2019-01-10 RX ADMIN — HEPARIN SODIUM 5000 UNIT(S): 5000 INJECTION INTRAVENOUS; SUBCUTANEOUS at 22:53

## 2019-01-10 RX ADMIN — Medication 100 MILLIGRAM(S): at 06:18

## 2019-01-10 RX ADMIN — SODIUM CHLORIDE 75 MILLILITER(S): 9 INJECTION, SOLUTION INTRAVENOUS at 23:02

## 2019-01-10 RX ADMIN — Medication 100 MILLIGRAM(S): at 22:53

## 2019-01-10 RX ADMIN — Medication 3 MILLILITER(S): at 09:30

## 2019-01-10 RX ADMIN — Medication 3 MILLILITER(S): at 15:24

## 2019-01-10 NOTE — DIETITIAN INITIAL EVALUATION ADULT. - PERTINENT MEDS FT
MEDICATIONS  (STANDING):  ALBUTerol/ipratropium for Nebulization 3 milliLiter(s) Nebulizer every 6 hours  cefTRIAXone Injectable. 1000 milliGRAM(s) IV Push every 24 hours  cefTRIAXone Injectable.      docusate sodium 100 milliGRAM(s) Oral three times a day  finasteride 5 milliGRAM(s) Oral daily  guaiFENesin  milliGRAM(s) Oral every 12 hours  heparin  Injectable 5000 Unit(s) SubCutaneous every 8 hours  metoprolol succinate  milliGRAM(s) Oral daily  sodium chloride 0.9%. 1000 milliLiter(s) (75 mL/Hr) IV Continuous <Continuous>  tamsulosin 0.4 milliGRAM(s) Oral at bedtime    MEDICATIONS  (PRN):  acetaminophen   Tablet .. 650 milliGRAM(s) Oral every 6 hours PRN Temp greater or equal to 38C (100.4F), Mild Pain (1 - 3)  bisacodyl Suppository 10 milliGRAM(s) Rectal once PRN Constipation  guaiFENesin    Syrup 100 milliGRAM(s) Oral every 6 hours PRN Cough  loperamide 2 milliGRAM(s) Oral every 6 hours PRN Diarrhea  ondansetron Injectable 4 milliGRAM(s) IV Push every 6 hours PRN Nausea  senna 2 Tablet(s) Oral at bedtime PRN Constipation

## 2019-01-10 NOTE — PHYSICAL THERAPY INITIAL EVALUATION ADULT - PERTINENT HX OF CURRENT PROBLEM, REHAB EVAL
Pt is an 82 y/o gentleman with a PMHx of HTN, BPH, anxiety, tob use and likely early dementia as per family although not formally diagnoseduntil last year sent in for worsening weakness, tremors and confusion with diarrhea for past 2 days.  As per family who is at bedside similar confusion and tremors when he had admission for flu and renal failure but no fever or chills this admission

## 2019-01-10 NOTE — DIETITIAN INITIAL EVALUATION ADULT. - PERTINENT LABORATORY DATA
01-10 Na146 mmol/L<H> Glu 94 mg/dL K+ 4.7 mmol/L Cr  4.22 mg/dL<H> BUN 73 mg/dL<H> Phos 4.8 mg/dL<H> Alb 2.1 g/dL<L> PAB n/a

## 2019-01-10 NOTE — PHYSICAL THERAPY INITIAL EVALUATION ADULT - ADDITIONAL COMMENTS
(history obtained from wife at bedside)been using rw for ambulation at times recently (recently at rehab/recovering from flu)  [place]. few VIANEY.  Steps throughout home, flight to bedroom.

## 2019-01-10 NOTE — PROGRESS NOTE ADULT - SUBJECTIVE AND OBJECTIVE BOX
History of Present Illness: 	  Pt is an 82 y/o gentleman with a PMHx of HTN, BPH, anxiety, tob use and likely early dementia as per family although not formally diagnoseduntil last year sent in for worsening weakness, tremors and confusion with diarrhea for past 2 days.  As per family who is at bedside similar confusion and tremors when he had admission for flu and renal failure but no fever or chills this admission.  pt has indwelling suprapubic romero whichw as changed by ED physician today.  As per wife the diarrhea watery and about 4 episodes per day for last couple of days with decreased po intake.  AMS with decreased urine output with hematuria in romero bag only after romero catheter change today.      1/10: Pt lethargic, unresponsive at bedside.  Spoke to wife to update her on plan.      REVIEW OF SYSTEMS: Unable to provide due to lethargy.      Physical Exam:  Vital Signs Last 24 Hrs T(C): 36.8 (10 Benjamin 2019 12:12), Max: 37.2 (10 Benjamin 2019 04:33) T(F): 98.2 (10 Benjamin 2019 12:12), Max: 98.9 (10 Benjamin 2019 04:33) HR: 50 (10 Benjamin 2019 12:12) (50 - 78) BP: 120/47 (10 Benjamin 2019 12:12) (102/51 - 140/59) BP(mean): -- RR: 18 (10 Benjamin 2019 12:12) (18 - 18) SpO2: 100% (10 Benjamin 2019 12:12) (97% - 100%)  GEN: lying in bed, NAD HEENT:   NC/AT,EOMI CV:  +S1, +S2, RRR RESP:   few scattered rhonchi  GI:  abdomen soft, non-tender, non-distended, normoactive BS :  suprapubic romero in place MSK:   normal muscle tone EXT:  + edema  NEURO:  AAOX1, no focal neurological deficits SKIN:  no rashes	    MEDICATIONS  (STANDING):  ALBUTerol/ipratropium for Nebulization 3 milliLiter(s) Nebulizer every 6 hours  cefTRIAXone Injectable. 1000 milliGRAM(s) IV Push every 24 hours  cefTRIAXone Injectable.      docusate sodium 100 milliGRAM(s) Oral three times a day  finasteride 5 milliGRAM(s) Oral daily  guaiFENesin  milliGRAM(s) Oral every 12 hours  heparin  Injectable 5000 Unit(s) SubCutaneous every 8 hours  metoprolol succinate  milliGRAM(s) Oral daily  sodium chloride 0.9%. 1000 milliLiter(s) (100 mL/Hr) IV Continuous <Continuous>  tamsulosin 0.4 milliGRAM(s) Oral at bedtime    MEDICATIONS  (PRN):  acetaminophen   Tablet .. 650 milliGRAM(s) Oral every 6 hours PRN Temp greater or equal to 38C (100.4F), Mild Pain (1 - 3)  bisacodyl Suppository 10 milliGRAM(s) Rectal once PRN Constipation  guaiFENesin    Syrup 100 milliGRAM(s) Oral every 6 hours PRN Cough  loperamide 2 milliGRAM(s) Oral every 6 hours PRN Diarrhea  ondansetron Injectable 4 milliGRAM(s) IV Push every 6 hours PRN Nausea  senna 2 Tablet(s) Oral at bedtime PRN Constipation    CARDIAC MARKERS ( 2019 13:01 )  <0.015 ng/mL / x     / x     / x     / x      CARDIAC MARKERS ( 2019 10:13 )  <0.015 ng/mL / x     / x     / x     / x                                9.3    6.63  )-----------( 126      ( 10 Benjamin 2019 07:56 )             28.9     01-10    146<H>  |  118<H>  |  73<H>  ----------------------------<  94  4.7   |  18<L>  |  4.22<H>    Ca    8.3<L>      10 Benjamin 2019 07:56  Phos  4.8     01-10  Mg     2.2         TPro  5.9<L>  /  Alb  2.1<L>  /  TBili  0.6  /  DBili  x   /  AST  16  /  ALT  24  /  AlkPhos  72  01-10    CAPILLARY BLOOD GLUCOSE        LIVER FUNCTIONS - ( 10 Benjamin 2019 07:56 )  Alb: 2.1 g/dL / Pro: 5.9 gm/dL / ALK PHOS: 72 U/L / ALT: 24 U/L / AST: 16 U/L / GGT: x             Urinalysis Basic - ( 2019 10:20 )    Color: Red / Appearance: bloody / S.015 / pH: x  Gluc: x / Ketone: Trace  / Bili: Negative / Urobili: Negative mg/dL   Blood: x / Protein: 100 mg/dL / Nitrite: Positive   Leuk Esterase: Moderate / RBC: >50 /HPF / WBC >50   Sq Epi: x / Non Sq Epi: Occasional / Bacteria: Occasional      Assessment and Plan:  84 yo male with PMH as per HPI here with acute on chronic renal failure.    Weakness/metabolic encephalopathy secondary to UTI from chronic indwelling romero and ZELDA:  -CT noted  -IV abx  -f/u cultures   -suprapubic catheter re-placed in ED  -decrease lyrica dose from 450mg to 75mg   -PT eval    Acute on chronic renal failure III:   -IVFs  -nephro following  -renal consult appreciated  -renal dose meds and hold ARB  -supportive care    Anemia/thrombocytopenia:  -monitor     tob use, chronic cough  -urged pt to quit  -nebs prn  -mucinex  -smoking cessation    Hematuria: RESOLVED  -has indwelling romero cath which was changed and traumatic change as per ED physician  -urology eval with dr escamilla who is his outpt doc     BPH   -continue flomax    HTN:   -hold losartan  -continue toprol Xl with parameters    DVT proph  -heparin     Advanced directives  -FULL code

## 2019-01-10 NOTE — PROGRESS NOTE ADULT - ASSESSMENT
82 yo male with HT, Chronic FORRESTER w SUprapubic catheter, w hx of poor po intake and now diarrhea presenting wieth weakness and falls     ZELDA on CKD 3 Cr base 1.5  while on ARB    Prerenal Azotemia leading to ATN ( FeNa > 1%)    Cr slighlty improved   - Hold ARB/ACE  - avoid NSAID  - continue  gentle IVF   - monitor Renal trend  - strict i and o's   - no acute indication for HD at this time   - Ct abd - no hdyro , + renal atrophy mild , + perinephric stranding - IV abx - await cutlures    Acidmeia - sec to ZELDA and diarrhea = monitor with IVF   Hypernatremia    - change to hypotonic half saline    d.w  wife and daughter at bedside    Thank you for the courtesy of this consult. We will follow this patient with you.   Management is subject to change if new information becomes available or patient condition changes. 84 yo male with HT, Chronic BPH w SUprapubic catheter, w hx of poor po intake and now diarrhea presenting wieth weakness and falls     ZELDA on CKD 3 Cr base 1.5  while on ARB    Prerenal Azotemia leading to ATN ( FeNa > 1%)    Cr slighlty improved   - Hold ARB/ACE  proteinuria 1.8 grams   heamturia sec to romero - repeat UA - if postive and Cr not improved then serologies    - avoid NSAID  - continue gentle IVF - reduce rate due to CHF hx   - monitor Renal trend  - strict i and o's   - no acute indication for HD at this time   - Ct abd - no hdyro , + renal atrophy mild , + perinephric stranding - IV abx - await cutlures    Acidmeia - sec to ZELDA and diarrhea = monitor with IVF   Hypernatremia    - change to hypotonic half saline    d.w  wife and daughter at bedside    Thank you for the courtesy of this consult. We will follow this patient with you.   Management is subject to change if new information becomes available or patient condition changes.

## 2019-01-10 NOTE — DIETITIAN INITIAL EVALUATION ADULT. - ORAL INTAKE PTA
fair/wife reports usual intake has decreased significantly over the past year and pt is eating ~50% or normal intake.

## 2019-01-10 NOTE — DIETITIAN INITIAL EVALUATION ADULT. - PHYSICAL APPEARANCE
overweight/BMI 27.1; NFPE significant for severe temporal wasting and moderate muscle wasting in clavicle and deltoids and moderate occipital fat wasting

## 2019-01-10 NOTE — DIETITIAN INITIAL EVALUATION ADULT. - OTHER INFO
82 yo M seen as consult for pressure ulcer stage 2 or greater. Pt p/w worsening weakness, tremors, confusion, and diarrhea x2 days. Admit dx ZELDA and dehydration . PMH HTN, BPH, anxiety, tobacco use, and likely early demential (formally dx last year). During assessment pt is asleep and difficult to rouse. Pts wife and daughter at bedside to assist with hx. Pt appears well nourished however NFPE significant for severe temporal wasting and moderate muscle wasting in clavicle and deltoids and moderate occipital fat wasting. Wife reports that pt was hospitalized ~1 year ago and since previous hospitalization pts wife reports that intake has reduced to half of normal. Based on dietary recall pt has been meeting less than 75% of needs for at least 1 month. Wife reports that UBW was ~210# 1 year ago pt is currently 197#. Wt loss is not clinically signifiant. Pt noted for trace edema b/l ankles. Based on above pt meets criteria for severe malnutrition in the context of chronic illness. Pt noted for diarrhea x2 days PTA. Pt noted with decreased UO and currently w/ strict I/o. Pt currently on renal diet; K WNL, Phos 4.5 (H). Pt with hypernatremia currently on IVf. Pt with stage 2 pressure ulcer on sacrum increasing protein needs. Saulo 14 = risk of breakdown. Recommendations: 1. Provide nepo BIdD. 2. Monitor electrolytes. 3. Monitor wt daily. 4. Encourage intake at each meal . 5. Provide 220 mg zinc BID x 10 days and  500mg vit c daily to promote wound healing. 6. Monitor I/o.

## 2019-01-10 NOTE — PROGRESS NOTE ADULT - SUBJECTIVE AND OBJECTIVE BOX
t: 84 y/o male with a PMHx of BPH, HTN, neuropathy presents to the ED c/o left sided weakness x3 days. Pt uses walker at baseline. Denies fevers, chills, body aches, CP, abd pain. Pt also reports associated cough. As per family, pt has been c/o generalized pain with difficulty ambulating and speaking x3 days. Family reports pt leaning to left side. +fatigue. Suprapubic catheter placed 1/29/18 after being dx with the flu and PNA. Last flush 2 weeks ago. Urologist Dr. Harrison. Current smoker. PCP Dr. Miller 	  · Presenting Symptoms: +weakness +difficulty ambulating and speaking +fatigue	  now per family pt has not been eating or drinking [place] - when he returned he had diarrhea past 3 days as well . He   takes losartan for BP  but  no NSAID or diuretics use .   recieved 3 Liters of fluid in ED     today feeliing ok   letahrgic but eating p[er family at bedside    PAST MEDICAL & SURGICAL HISTORY:  Leg pain, left: &quot;ankle&quot;.  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 1000 milliLiter(s) (75 mL/Hr) IV Continuous <Continuous>      Allergies    No Known Allergies    Intolerances        SOCIAL HISTORY:  Denies ETOh,Smoking,     FAMILY HISTORY:  Not contributory     REVIEW OF SYSTEMS:  UTO due to lethargy     Vital Signs Last 24 Hrs  T(C): 36.6 (10 Benjamin 2019 16:46), Max: 37.2 (10 Benjamin 2019 04:33)  T(F): 97.9 (10 Benjamin 2019 16:46), Max: 98.9 (10 Benjamin 2019 04:33)  HR: 60 (10 Benjamin 2019 17:05) (49 - 78)  BP: 125/42 (10 Benjamin 2019 16:46) (120/47 - 140/59)  BP(mean): --  RR: 18 (10 Benjamin 2019 16:46) (18 - 18)  SpO2: 100% (10 Benjamin 2019 16:46) (96% - 100%)    I&O's Summary        PHYSICAL EXAM:    Constitutional: NAD  HEENT: , EOMI,  MM = Dry   Neck: No LAD, No JVD  Respiratory: CTAB  Cardiovascular: S1 and S2  Gastrointestinal: BS+, soft, NT/ND  Extremities: No peripheral edema  Neurological: lethargic   : Romero + SPT w lighter  urine   Skin: No rashes  Access: Not applicable    LABS:                  146    |  118    |  73     ----------------------------<  94        10 Benjamin 2019 07:56  4.7     |  18     |  4.22     144    |  114    |  84     ----------------------------<  113       09 Jan 2019 15:46  4.5     |  18     |  4.45     139    |  110    |  82     ----------------------------<  117       09 Jan 2019 10:13  4.6     |  20     |  5.02     Ca    8.3        10 Benjamin 2019 07:56  Ca    8.1        09 Jan 2019 15:46    Phos  4.8       10 Benjamin 2019 07:56  Phos  4.6       09 Jan 2019 15:46                        9.3    6.63  )-----------( 126      ( 10 Benjamin 2019 07:56 )             28.9                         9.5    6.35  )-----------( 107      ( 09 Jan 2019 22:25 )             29.9       Urine Studies:    Urine Microscopic-Add On (NC) (01.09.19 @ 10:20)    Red Blood Cell - Urine: >50 /HPF    White Blood Cell - Urine: >50    Bacteria: Occasional    Epithelial Cells: Occasional    Urinalysis (01.09.19 @ 10:20)    Glucose Qualitative, Urine: Negative mg/dL    Blood, Urine: Large    pH Urine: 5.0    Color: Red    Urine Appearance: bloody    Bilirubin: Negative    Ketone - Urine: Trace    Specific Gravity: 1.015    Protein, Urine: 100 mg/dL    Urobilinogen: Negative mg/dL    Nitrite: Positive    Leukocyte Esterase Concentration: Moderate    RADIOLOGY & ADDITIONAL STUDIES:      LOWER LUNGS AND PLEURA: Coronary vascular calcification. Patchy opacities   and nodularity in the left lower lobe may represent atelectasis and or   pneumonia. Trace left pleural effusion and/or pleural thickening.    LIVER: within normal limits.  BILE DUCTS: normal caliber.  GALLBLADDER: Small gallstones and/or gallbladder sludge.    PANCREAS: within normal limits.  SPLEEN: within normal limits.  ADRENALS: within normal limits.    KIDNEYS, URETERS AND BLADDER: Mild bilateral renal atrophy. No   hydronephrosis. Bilateral perinephric stranding and trace bowel   perinephric fluid. Left lateral midpole renal cyst. No intrarenal   calculus. The ureters are unremarkable. A suprapubic catheter is   identified with catheter tip and catheter balloon is seen within the mid   canal urethra and needs to be repositioned. The bladder is collapsed.    PELVIS: no pelvic masses.No pelvic lymphadenopathy.    BOWEL: The unopacified bowel is of normal course and caliber without   evidence of obstruction or bowel wall thickening. A normal appendix is   visualized. Colonic diverticulosis without diverticulitis.    PERITONEUM: no ascites or free air, no fluid collection.  RETROPERITONEUM: within normal limits.      VESSELS: atherosclerotic changes.      ABDOMINAL WALL: Minimal anasarca.  BONES: Degenerative changes.     IMPRESSION:     A suprapubic catheter is identified with catheter tip and catheter   balloon is seen within the mid canal urethra and needs to be repositioned    No hydronephrosis. Nonspecific bilateral perinephric stranding.

## 2019-01-10 NOTE — CHART NOTE - NSCHARTNOTEFT_GEN_A_CORE
Upon Nutritional Assessment by the Registered Dietitian your patient was determined to meet criteria / has evidence of the following diagnosis/diagnoses:          [ ]  Mild Protein Calorie Malnutrition        [ ]  Moderate Protein Calorie Malnutrition        [x ] Severe Protein Calorie Malnutrition        [ ] Unspecified Protein Calorie Malnutrition        [ ] Underweight / BMI <19        [ ] Morbid Obesity / BMI > 40      Findings:    NFPE significant for severe temporal wasting and moderate muscle wasting in clavicle and deltoids and moderate occipital fat wasting. Wife reports that pt was hospitalized ~1 year ago and since previous hospitalization pts wife reports that intake has reduced to half of normal. Based on dietary recall pt has been meeting less than 75% of needs for at least 1 month. Wife reports that UBW was ~210# 1 year ago pt is currently 197#. Wt loss is not clinically significant. Pt noted for trace edema b/l ankles.    Findings as based on:  •  Comprehensive nutrition assessment and consultation  •  Calorie counts (nutrient intake analysis)  •  Food acceptance and intake status from observations by staff  •  Follow up  •  Patient education  •  Intervention secondary to interdisciplinary rounds  •   concerns      Treatment:    1. Provide nepo BIdD.   2. Monitor electrolytes.   3. Monitor wt daily.   4. Encourage intake at each meal .   5. Provide 220 mg zinc BID x 10 days and  500mg vit c daily to promote wound healing.   6. Monitor I/o.       The following diet has been recommended:      PROVIDER Section:     By signing this assessment you are acknowledging and agree with the diagnosis/diagnoses assigned by the Registered Dietitian    Comments: Upon Nutritional Assessment by the Registered Dietitian your patient was determined to meet criteria / has evidence of the following diagnosis/diagnoses:          [ ]  Mild Protein Calorie Malnutrition        [ ]  Moderate Protein Calorie Malnutrition        [x ] Severe Protein Calorie Malnutrition        [ ] Unspecified Protein Calorie Malnutrition        [ ] Underweight / BMI <19        [ ] Morbid Obesity / BMI > 40      Findings:    NFPE significant for severe temporal wasting and moderate muscle wasting in clavicle and deltoids and moderate occipital fat wasting. Wife reports that pt was hospitalized ~1 year ago and since previous hospitalization pts wife reports that intake has reduced to half of normal. Based on dietary recall pt has been meeting less than 75% of needs for at least 1 month. Wife reports that UBW was ~210# 1 year ago pt is currently 197#. Wt loss is not clinically significant. Pt noted for trace edema b/l ankles.    Findings as based on:  •  Comprehensive nutrition assessment and consultation  •  Calorie counts (nutrient intake analysis)  •  Food acceptance and intake status from observations by staff  •  Follow up  •  Patient education  •  Intervention secondary to interdisciplinary rounds  •   concerns      Treatment:    1. Provide nepo BID.   2. Monitor electrolytes.   3. Monitor wt daily.   4. Encourage intake at each meal .   5. Provide 220 mg zinc BID x 10 days and  500mg vit c daily to promote wound healing.   6. Monitor I/o.       The following diet has been recommended:      PROVIDER Section:     By signing this assessment you are acknowledging and agree with the diagnosis/diagnoses assigned by the Registered Dietitian    Comments:

## 2019-01-11 LAB
ANION GAP SERPL CALC-SCNC: 6 MMOL/L — SIGNIFICANT CHANGE UP (ref 5–17)
BUN SERPL-MCNC: 69 MG/DL — HIGH (ref 7–23)
CALCIUM SERPL-MCNC: 8.8 MG/DL — SIGNIFICANT CHANGE UP (ref 8.5–10.1)
CHLORIDE SERPL-SCNC: 119 MMOL/L — HIGH (ref 96–108)
CO2 SERPL-SCNC: 20 MMOL/L — LOW (ref 22–31)
CREAT SERPL-MCNC: 3.81 MG/DL — HIGH (ref 0.5–1.3)
GLUCOSE SERPL-MCNC: 103 MG/DL — HIGH (ref 70–99)
HCT VFR BLD CALC: 30.6 % — LOW (ref 39–50)
HGB BLD-MCNC: 9.5 G/DL — LOW (ref 13–17)
M PROTEIN 24H UR ELPH-MRATE: SIGNIFICANT CHANGE UP
MCHC RBC-ENTMCNC: 29.7 PG — SIGNIFICANT CHANGE UP (ref 27–34)
MCHC RBC-ENTMCNC: 31 GM/DL — LOW (ref 32–36)
MCV RBC AUTO: 95.6 FL — SIGNIFICANT CHANGE UP (ref 80–100)
NRBC # BLD: 0 /100 WBCS — SIGNIFICANT CHANGE UP (ref 0–0)
PLATELET # BLD AUTO: 142 K/UL — LOW (ref 150–400)
POTASSIUM SERPL-MCNC: 4.9 MMOL/L — SIGNIFICANT CHANGE UP (ref 3.5–5.3)
POTASSIUM SERPL-SCNC: 4.9 MMOL/L — SIGNIFICANT CHANGE UP (ref 3.5–5.3)
RBC # BLD: 3.2 M/UL — LOW (ref 4.2–5.8)
RBC # FLD: 14.3 % — SIGNIFICANT CHANGE UP (ref 10.3–14.5)
SODIUM SERPL-SCNC: 145 MMOL/L — SIGNIFICANT CHANGE UP (ref 135–145)
WBC # BLD: 7.05 K/UL — SIGNIFICANT CHANGE UP (ref 3.8–10.5)
WBC # FLD AUTO: 7.05 K/UL — SIGNIFICANT CHANGE UP (ref 3.8–10.5)

## 2019-01-11 RX ADMIN — Medication 100 MILLIGRAM(S): at 05:33

## 2019-01-11 RX ADMIN — Medication 600 MILLIGRAM(S): at 17:39

## 2019-01-11 RX ADMIN — Medication 75 MILLIGRAM(S): at 11:24

## 2019-01-11 RX ADMIN — Medication 100 MILLIGRAM(S): at 22:07

## 2019-01-11 RX ADMIN — FINASTERIDE 5 MILLIGRAM(S): 5 TABLET, FILM COATED ORAL at 11:24

## 2019-01-11 RX ADMIN — HEPARIN SODIUM 5000 UNIT(S): 5000 INJECTION INTRAVENOUS; SUBCUTANEOUS at 22:07

## 2019-01-11 RX ADMIN — Medication 100 MILLIGRAM(S): at 14:45

## 2019-01-11 RX ADMIN — Medication 3 MILLILITER(S): at 07:58

## 2019-01-11 RX ADMIN — HEPARIN SODIUM 5000 UNIT(S): 5000 INJECTION INTRAVENOUS; SUBCUTANEOUS at 14:45

## 2019-01-11 RX ADMIN — HEPARIN SODIUM 5000 UNIT(S): 5000 INJECTION INTRAVENOUS; SUBCUTANEOUS at 05:33

## 2019-01-11 RX ADMIN — SODIUM CHLORIDE 75 MILLILITER(S): 9 INJECTION, SOLUTION INTRAVENOUS at 11:28

## 2019-01-11 RX ADMIN — TAMSULOSIN HYDROCHLORIDE 0.4 MILLIGRAM(S): 0.4 CAPSULE ORAL at 22:07

## 2019-01-11 RX ADMIN — CEFTRIAXONE 1000 MILLIGRAM(S): 500 INJECTION, POWDER, FOR SOLUTION INTRAMUSCULAR; INTRAVENOUS at 17:38

## 2019-01-11 RX ADMIN — Medication 600 MILLIGRAM(S): at 05:33

## 2019-01-11 NOTE — PROGRESS NOTE ADULT - ASSESSMENT
82 yo male with HT, Chronic BPH w SUprapubic catheter, w hx of poor po intake and now diarrhea presenting wieth weakness and falls     ZELDA on CKD 3 Cr base 1.5  while on ARB    Prerenal Azotemia leading to ATN ( FeNa > 1%)    Cr improving  - continue IVF   - continue Hold ARB/ACE  - proteinuria 1.8 grams     Hematuria sec to romero - repeat UA - if postive and Cr not improved then serologies    - avoid NSAID  - continue gentle IVF - reduce rate due to CHF hx   - monitor Renal trend  - strict i and o's   - no acute indication for HD at this time   - CT abd - no hdyro , + renal atrophy mild , + perinephric stranding - IV abx - await cutlures    Acidmeia - sec to ZELDA and diarrhea = monitor with IVF   Hypernatremia    - hypotonic half saline    d.w  wife and daughter at bedside    Thank you for the courtesy of this consult. We will follow this patient with you.   Management is subject to change if new information becomes available or patient condition changes.

## 2019-01-11 NOTE — PROGRESS NOTE ADULT - SUBJECTIVE AND OBJECTIVE BOX
History of Present Illness: 	  Pt is an 82 y/o gentleman with a PMHx of HTN, BPH, anxiety, tob use and likely early dementia as per family although not formally diagnoseduntil last year sent in for worsening weakness, tremors and confusion with diarrhea for past 2 days.  As per family who is at bedside similar confusion and tremors when he had admission for flu and renal failure but no fever or chills this admission.  pt has indwelling suprapubic romero whichw as changed by ED physician today.  As per wife the diarrhea watery and about 4 episodes per day for last couple of days with decreased po intake.  AMS with decreased urine output with hematuria in romero bag only after romero catheter change today.      1/10: Pt lethargic, unresponsive at bedside.  Spoke to wife to update her on plan.  1/11: Pt alert, sitting in chair.  He feels much better, and offers no complaints.  Kidney function improving.  No fever, chills, n, v.    REVIEW OF SYSTEMS: Unable to provide due to lethargy.      Physical Exam:  Vital Signs Last 24 Hrs T(C): 36.6 (11 Jan 2019 12:03), Max: 36.9 (11 Jan 2019 05:23) T(F): 97.8 (11 Jan 2019 12:03), Max: 98.5 (11 Jan 2019 05:23) HR: 59 (11 Jan 2019 12:03) (49 - 60) BP: 123/54 (11 Jan 2019 12:03) (123/54 - 133/64) RR: 18 (11 Jan 2019 12:03) (18 - 18) SpO2: 95% (11 Jan 2019 12:03) (95% - 100%)  GEN: lying in bed, NAD HEENT:   NC/AT,EOMI CV:  +S1, +S2, RRR RESP:   few scattered rhonchi  GI:  abdomen soft, non-tender, non-distended, normoactive BS :  suprapubic romero in place MSK:   normal muscle tone EXT:  + edema  NEURO:  AAOX1, no focal neurological deficits SKIN:  no rashes	    MEDICATIONS  (STANDING):  ALBUTerol/ipratropium for Nebulization 3 milliLiter(s) Nebulizer every 6 hours  cefTRIAXone Injectable. 1000 milliGRAM(s) IV Push every 24 hours  cefTRIAXone Injectable.      docusate sodium 100 milliGRAM(s) Oral three times a day  finasteride 5 milliGRAM(s) Oral daily  guaiFENesin  milliGRAM(s) Oral every 12 hours  heparin  Injectable 5000 Unit(s) SubCutaneous every 8 hours  metoprolol succinate  milliGRAM(s) Oral daily  pregabalin 75 milliGRAM(s) Oral daily  sodium chloride 0.45%. 1000 milliLiter(s) (75 mL/Hr) IV Continuous <Continuous>  tamsulosin 0.4 milliGRAM(s) Oral at bedtime    MEDICATIONS  (PRN):  acetaminophen   Tablet .. 650 milliGRAM(s) Oral every 6 hours PRN Temp greater or equal to 38C (100.4F), Mild Pain (1 - 3)  bisacodyl Suppository 10 milliGRAM(s) Rectal once PRN Constipation  guaiFENesin    Syrup 100 milliGRAM(s) Oral every 6 hours PRN Cough  loperamide 2 milliGRAM(s) Oral every 6 hours PRN Diarrhea  ondansetron Injectable 4 milliGRAM(s) IV Push every 6 hours PRN Nausea  senna 2 Tablet(s) Oral at bedtime PRN Constipation                              9.5    7.05  )-----------( 142      ( 11 Jan 2019 08:35 )             30.6     01-11    145  |  119<H>  |  69<H>  ----------------------------<  103<H>  4.9   |  20<L>  |  3.81<H>    Ca    8.8      11 Jan 2019 08:35  Phos  4.8     01-10    TPro  5.9<L>  /  Alb  2.1<L>  /  TBili  0.6  /  DBili  x   /  AST  16  /  ALT  24  /  AlkPhos  72  01-10    CAPILLARY BLOOD GLUCOSE    LIVER FUNCTIONS - ( 10 Benjamin 2019 07:56 )  Alb: 2.1 g/dL / Pro: 5.9 gm/dL / ALK PHOS: 72 U/L / ALT: 24 U/L / AST: 16 U/L / GGT: x             Assessment and Plan:  82 yo male with PMH as per HPI here with acute on chronic renal failure.    Weakness/toxic-metabolic encephalopathy secondary to UTI from chronic indwelling romero, ZELDA, and medication overdose:  -CT noted  -IV abx day 3 of 3  -cultures show contamination   -suprapubic catheter re-placed in ED  -decreased lyrica dose from 450mg to 75mg --> continue at this dose.  -PT eval --> recommends some PT.  Family would like to take pt home on discharge.     Acute on chronic renal failure III w/ ATN:   -IVFs per nephro  -follow Scr  -renal consult appreciated  -renal dose meds and hold ARB  -supportive care    Anemia/thrombocytopenia:  -monitor     tob use, chronic cough  -urged pt to quit  -nebs prn  -mucinex  -smoking cessation    Hematuria: RESOLVED  -has indwelling romero cath which was changed and traumatic change as per ED physician  -urology eval with dr escamilla who is his outpt doc     BPH   -continue flomax    HTN:   -hold losartan  -continue toprol Xl with parameters    DVT proph  -heparin     Advanced directives  -FULL code    dispo: Home tomorrow w/ home care.

## 2019-01-11 NOTE — PROGRESS NOTE ADULT - SUBJECTIVE AND OBJECTIVE BOX
t: 82 y/o male with a PMHx of BPH, HTN, neuropathy presents to the ED c/o left sided weakness x3 days. Pt uses walker at baseline. Denies fevers, chills, body aches, CP, abd pain. Pt also reports associated cough. As per family, pt has been c/o generalized pain with difficulty ambulating and speaking x3 days. Family reports pt leaning to left side. +fatigue. Suprapubic catheter placed 1/29/18 after being dx with the flu and PNA. Last flush 2 weeks ago. Urologist Dr. Harrison. Current smoker. PCP Dr. Miller 	  · Presenting Symptoms: +weakness +difficulty ambulating and speaking +fatigue	  now per family pt has not been eating or drinking [place] - when he returned he had diarrhea past 3 days as well . He   takes losartan for BP  but  no NSAID or diuretics use .   recieved 3 Liters of fluid in ED     awake today   confused      PAST MEDICAL & SURGICAL HISTORY:  Leg pain, left: &quot;ankle&quot;.  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 1000 milliGRAM(s) IV Push every 24 hours  cefTRIAXone Injectable.      docusate sodium 100 milliGRAM(s) Oral three times a day  finasteride 5 milliGRAM(s) Oral daily  guaiFENesin  milliGRAM(s) Oral every 12 hours  heparin  Injectable 5000 Unit(s) SubCutaneous every 8 hours  metoprolol succinate  milliGRAM(s) Oral daily  pregabalin 75 milliGRAM(s) Oral daily  sodium chloride 0.45%. 1000 milliLiter(s) (75 mL/Hr) IV Continuous <Continuous>  tamsulosin 0.4 milliGRAM(s) Oral at bedtime      Allergies    No Known Allergies    Intolerances        SOCIAL HISTORY:  Denies ETOh,Smoking,     FAMILY HISTORY:  Not contributory     REVIEW OF SYSTEMS:  UTO due to lethargy     Vital Signs Last 24 Hrs  T(C): 36.6 (11 Jan 2019 12:03), Max: 36.9 (11 Jan 2019 05:23)  T(F): 97.8 (11 Jan 2019 12:03), Max: 98.5 (11 Jan 2019 05:23)  HR: 59 (11 Jan 2019 12:03) (56 - 60)  BP: 123/54 (11 Jan 2019 12:03) (123/54 - 133/64)  BP(mean): --  RR: 18 (11 Jan 2019 12:03) (18 - 18)  SpO2: 95% (11 Jan 2019 12:03) (95% - 97%))    I&O's Summary  I&O's Summary    10 Benjamin 2019 07:01  -  11 Jan 2019 07:00  --------------------------------------------------------  IN: 0 mL / OUT: 625 mL / NET: -625 mL            PHYSICAL EXAM:    Constitutional: NAD  HEENT: , EOMI,  MM = Dry   Neck: No LAD, No JVD  Respiratory: CTAB  Cardiovascular: S1 and S2  Gastrointestinal: BS+, soft, NT/ND  Extremities: No peripheral edema  Neurological: lawake confused   : Romero + SPT w lighter  urine   Skin: No rashes  Access: Not applicable    LABS:         145    |  119    |  69     ----------------------------<  103       11 Jan 2019 08:35  4.9     |  20     |  3.81     146    |  118    |  73     ----------------------------<  94        10 Benjamin 2019 07:56  4.7     |  18     |  4.22     144    |  114    |  84     ----------------------------<  113       09 Jan 2019 15:46  4.5     |  18     |  4.45     Ca    8.8        11 Jan 2019 08:35  Ca    8.3        10 Benjamin 2019 07:56    Phos  4.8       10 Benjamin 2019 07:56  Phos  4.6       09 Jan 2019 15:46      Urine Studies:    Urine Microscopic-Add On (NC) (01.09.19 @ 10:20)    Red Blood Cell - Urine: >50 /HPF    White Blood Cell - Urine: >50    Bacteria: Occasional    Epithelial Cells: Occasional    Urinalysis (01.09.19 @ 10:20)    Glucose Qualitative, Urine: Negative mg/dL    Blood, Urine: Large    pH Urine: 5.0    Color: Red    Urine Appearance: bloody    Bilirubin: Negative    Ketone - Urine: Trace    Specific Gravity: 1.015    Protein, Urine: 100 mg/dL    Urobilinogen: Negative mg/dL    Nitrite: Positive    Leukocyte Esterase Concentration: Moderate    RADIOLOGY & ADDITIONAL STUDIES:      LOWER LUNGS AND PLEURA: Coronary vascular calcification. Patchy opacities   and nodularity in the left lower lobe may represent atelectasis and or   pneumonia. Trace left pleural effusion and/or pleural thickening.    LIVER: within normal limits.  BILE DUCTS: normal caliber.  GALLBLADDER: Small gallstones and/or gallbladder sludge.    PANCREAS: within normal limits.  SPLEEN: within normal limits.  ADRENALS: within normal limits.    KIDNEYS, URETERS AND BLADDER: Mild bilateral renal atrophy. No   hydronephrosis. Bilateral perinephric stranding and trace bowel   perinephric fluid. Left lateral midpole renal cyst. No intrarenal   calculus. The ureters are unremarkable. A suprapubic catheter is   identified with catheter tip and catheter balloon is seen within the mid   canal urethra and needs to be repositioned. The bladder is collapsed.    PELVIS: no pelvic masses.No pelvic lymphadenopathy.    BOWEL: The unopacified bowel is of normal course and caliber without   evidence of obstruction or bowel wall thickening. A normal appendix is   visualized. Colonic diverticulosis without diverticulitis.    PERITONEUM: no ascites or free air, no fluid collection.  RETROPERITONEUM: within normal limits.      VESSELS: atherosclerotic changes.      ABDOMINAL WALL: Minimal anasarca.  BONES: Degenerative changes.     IMPRESSION:     A suprapubic catheter is identified with catheter tip and catheter   balloon is seen within the mid canal urethra and needs to be repositioned    No hydronephrosis. Nonspecific bilateral perinephric stranding.

## 2019-01-12 ENCOUNTER — TRANSCRIPTION ENCOUNTER (OUTPATIENT)
Age: 84
End: 2019-01-12

## 2019-01-12 VITALS — HEART RATE: 58 BPM

## 2019-01-12 LAB
ANION GAP SERPL CALC-SCNC: 12 MMOL/L — SIGNIFICANT CHANGE UP (ref 5–17)
BUN SERPL-MCNC: 64 MG/DL — HIGH (ref 7–23)
CALCIUM SERPL-MCNC: 8.8 MG/DL — SIGNIFICANT CHANGE UP (ref 8.5–10.1)
CHLORIDE SERPL-SCNC: 115 MMOL/L — HIGH (ref 96–108)
CO2 SERPL-SCNC: 17 MMOL/L — LOW (ref 22–31)
CREAT SERPL-MCNC: 3.2 MG/DL — HIGH (ref 0.5–1.3)
GLUCOSE SERPL-MCNC: 91 MG/DL — SIGNIFICANT CHANGE UP (ref 70–99)
HCT VFR BLD CALC: 28.1 % — LOW (ref 39–50)
HGB BLD-MCNC: 9.1 G/DL — LOW (ref 13–17)
MCHC RBC-ENTMCNC: 29.8 PG — SIGNIFICANT CHANGE UP (ref 27–34)
MCHC RBC-ENTMCNC: 32.4 GM/DL — SIGNIFICANT CHANGE UP (ref 32–36)
MCV RBC AUTO: 92.1 FL — SIGNIFICANT CHANGE UP (ref 80–100)
NRBC # BLD: 0 /100 WBCS — SIGNIFICANT CHANGE UP (ref 0–0)
PLATELET # BLD AUTO: 150 K/UL — SIGNIFICANT CHANGE UP (ref 150–400)
POTASSIUM SERPL-MCNC: 4.7 MMOL/L — SIGNIFICANT CHANGE UP (ref 3.5–5.3)
POTASSIUM SERPL-SCNC: 4.7 MMOL/L — SIGNIFICANT CHANGE UP (ref 3.5–5.3)
RBC # BLD: 3.05 M/UL — LOW (ref 4.2–5.8)
RBC # FLD: 14.1 % — SIGNIFICANT CHANGE UP (ref 10.3–14.5)
SODIUM SERPL-SCNC: 144 MMOL/L — SIGNIFICANT CHANGE UP (ref 135–145)
WBC # BLD: 5.25 K/UL — SIGNIFICANT CHANGE UP (ref 3.8–10.5)
WBC # FLD AUTO: 5.25 K/UL — SIGNIFICANT CHANGE UP (ref 3.8–10.5)

## 2019-01-12 RX ORDER — FINASTERIDE 5 MG/1
1 TABLET, FILM COATED ORAL
Qty: 0 | Refills: 0 | DISCHARGE
Start: 2019-01-12

## 2019-01-12 RX ORDER — LOSARTAN POTASSIUM 100 MG/1
1 TABLET, FILM COATED ORAL
Qty: 0 | Refills: 0 | COMMUNITY

## 2019-01-12 RX ORDER — ACETAMINOPHEN 500 MG
2 TABLET ORAL
Qty: 0 | Refills: 0 | DISCHARGE
Start: 2019-01-12

## 2019-01-12 RX ADMIN — FINASTERIDE 5 MILLIGRAM(S): 5 TABLET, FILM COATED ORAL at 11:31

## 2019-01-12 RX ADMIN — Medication 600 MILLIGRAM(S): at 05:51

## 2019-01-12 RX ADMIN — Medication 75 MILLIGRAM(S): at 11:31

## 2019-01-12 RX ADMIN — HEPARIN SODIUM 5000 UNIT(S): 5000 INJECTION INTRAVENOUS; SUBCUTANEOUS at 05:51

## 2019-01-12 RX ADMIN — Medication 100 MILLIGRAM(S): at 05:51

## 2019-01-12 RX ADMIN — Medication 3 MILLILITER(S): at 07:48

## 2019-01-12 NOTE — DISCHARGE NOTE ADULT - PLAN OF CARE
improvement of kidney function. Encourage oral hydration.  Stop losartan.  Reduce dose of lyrica to no more than 75mg daily.  Follow up closely with your pcp and or nephrologist next week. med management. Resume your home meds for BPH. You received 3 days of antibiotics.

## 2019-01-12 NOTE — DISCHARGE NOTE ADULT - PATIENT PORTAL LINK FT
You can access the [facility] Patient Portal, offered by [facility], by registering with the following website: [email or web address]

## 2019-01-12 NOTE — DISCHARGE NOTE ADULT - NSTOBACCOHOTLINE_GEN_A_CS
[facility] Smokers Quitline ([phone]-AW-[phone]) [facility] Smokers Quitline ([phone]-SY-RRWEP)

## 2019-01-12 NOTE — DISCHARGE NOTE ADULT - PROVIDER TOKENS
TOKEN:'[id number]',FREE:[LAST:[your nephrologist next week],PHONE:[(   )    -],FAX:[(   )    -]]

## 2019-01-12 NOTE — DISCHARGE NOTE ADULT - MEDICATION SUMMARY - MEDICATIONS TO TAKE
I will START or STAY ON the medications listed below when I get home from the hospital:    finasteride 5 mg oral tablet  -- 1 tab(s) by mouth once a day  -- Indication: For resume home med    acetaminophen 325 mg oral tablet  -- 2 tab(s) by mouth every 6 hours, As needed, Temp greater or equal to 38C (100.4F), Mild Pain (1 - 3)  -- Indication: For pain    tamsulosin 0.4 mg oral capsule  -- 1 cap(s) by mouth 2 times a day  -- Indication: For resume home med    pregabalin 75 mg oral capsule  -- 1 cap(s) by mouth once a day MDD:75mg  -- Indication: For reduce lyrica dose    metoprolol succinate 100 mg oral tablet, extended release  -- 1 tab(s) by mouth once a day  -- Indication: For resume home med I will START or STAY ON the medications listed below when I get home from the hospital:    finasteride 5 mg oral tablet  -- 1 tab(s) by mouth once a day  -- Indication: For resume home med    acetaminophen 325 mg oral tablet  -- 2 tab(s) by mouth every 6 hours, As needed, Temp greater or equal to 38C (100.4F), Mild Pain (1 - 3)  -- Indication: For pain    tamsulosin 0.4 mg oral capsule  -- 1 cap(s) by mouth 2 times a day  -- Indication: For resume home med    pregabalin 75 mg oral capsule  -- 1 cap(s) by mouth once a day MDD:75mg  -- Indication: For change lyrica dosing    metoprolol succinate 100 mg oral tablet, extended release  -- 1 tab(s) by mouth once a day  -- Indication: For resume home med

## 2019-01-12 NOTE — DISCHARGE NOTE ADULT - HOSPITAL COURSE
· Reason for Admission	weakness and confusion	  History of Present Illness: 	  Pt is an 82 y/o gentleman with a PMHx of HTN, BPH, anxiety, tob use and likely early dementia as per family although not formally diagnoseduntil last year sent in for worsening weakness, tremors and confusion with diarrhea for past 2 days.  As per family who is at bedside similar confusion and tremors when he had admission for flu and renal failure but no fever or chills this admission.  pt has indwelling suprapubic romero whichw as changed by ED physician today.  As per wife the diarrhea watery and about 4 episodes per day for last couple of days with decreased po intake.  AMS with decreased urine output with hematuria in romero bag only after romero catheter change today.   Pt lethargic with acute renal failure, UTI, lyrica overdose due to ZELDA.  Pt was treated with antibiotics, and lyrica dose re-adjusted.  Further pt was provided IVFs and losartan was stopped.  Pt improved with mentation to baseline.  Pt also with improving Scr.  He feels well is HD stable and ready for discharge home with home care and close outpatient follow up next week for repeat blood work.    REVIEW OF SYSTEMS: Unable to provide due to lethargy.    Physical Exam:  Vital Signs Last 24 Hrs T(C): 36.8 (12 Jan 2019 05:12), Max: 36.8 (12 Jan 2019 05:12) T(F): 98.3 (12 Jan 2019 05:12), Max: 98.3 (12 Jan 2019 05:12) HR: 59 (12 Jan 2019 07:48) (53 - 60) BP: 148/51 (12 Jan 2019 05:12) (123/54 - 148/51) RR: 18 (12 Jan 2019 05:12) (18 - 18) SpO2: 95% (12 Jan 2019 05:12) (95% - 95%)  GEN: lying in bed, NAD HEENT:   NC/AT,EOMI CV:  +S1, +S2, RRR RESP:   CTA no r/w/r GI:  abdomen soft, non-tender, non-distended, normoactive BS :  suprapubic romero in place MSK:   normal muscle tone EXT:  no c/c/e NEURO:  AAOX1, no focal neurological deficits SKIN:  no rashes	    meds/labs: Reviewed     Assessment and Plan:  82 yo male with PMH as per HPI here with acute on chronic renal failure.    Weakness/toxic-metabolic encephalopathy secondary to UTI from chronic indwelling romero, ZELDA, and medication overdose due to ZELDA:  -CT noted  -IV abx day 3 of 3 completed.  -cultures show contamination   -suprapubic catheter re-placed in ED  -decreased lyrica dose from 450mg to 75mg --> continue at this dose.  -PT eval --> recommends some PT.  Family would like to take pt home on discharge.     Acute on chronic renal failure III w/ ATN:   -IVFs per nephro  -Scr trending down  -renal consult appreciated  -renal dose meds and hold ARB  -supportive care    Anemia/thrombocytopenia: Anemia stable likely AOCD, and thrombocytopenia resollved.    tob use, chronic cough  -urged pt to quit  -nebs prn  -mucinex  -smoking cessation    Hematuria: RESOLVED  -has indwelling romero cath which was changed and traumatic change as per ED physician  -urology eval with dr escamilla who is his outpt doc     BPH: Continue outpatient meds.    HTN: hold losartan and continue toprol Xl    dispo: Home w/ home care.    Attending Statement: 40 minutes spent on total encounter and discharge planning.

## 2019-01-12 NOTE — DISCHARGE NOTE ADULT - MEDICATION SUMMARY - MEDICATIONS TO CHANGE
I will SWITCH the dose or number of times a day I take the medications listed below when I get home from the hospital:    Lyrica 225 mg oral capsule  -- 1 cap(s) by mouth 2 times a day

## 2019-01-12 NOTE — DISCHARGE NOTE ADULT - CARE PLAN
Principal Discharge DX:	ZELDA (acute kidney injury)  Goal:	improvement of kidney function.  Assessment and plan of treatment:	Encourage oral hydration.  Stop losartan.  Reduce dose of lyrica to no more than 75mg daily.  Follow up closely with your pcp and or nephrologist next week.  Secondary Diagnosis:	BPH (benign prostatic hyperplasia)  Goal:	med management.  Assessment and plan of treatment:	Resume your home meds for BPH.  Secondary Diagnosis:	UTI (urinary tract infection)  Goal:	med management.  Assessment and plan of treatment:	You received 3 days of antibiotics.

## 2019-01-12 NOTE — DISCHARGE NOTE ADULT - RECOGNIZE DANGER SITUATIONS. FOR EXAMPLE, STRESS, DRINKING ALCOHOL, URGES TO SMOKE, SMOKING CUES, CIGARETTE AVAILABILITY
[facility] Emergency Department    [address]    [facility] [address]    Phone:  [phone]    Fax:  [phone]                                       Mariusz Griffiths   MRN: [id number]    Department:  [facility] Emergency Department   Date of Visit:  10/15/2017           Patient Information     Date Of Birth          1988        Your diagnoses for this visit were:     Viral upper respiratory tract infection     Viral syndrome        You were seen by Manuela Coyne MD.      Follow-up Information     Follow up with [facility] In 3 days.    Specialty:  Family Medicine    Contact information:    [address] [facility] [address] [phone]        Follow up with [facility] Emergency Department.    Specialty:  EMERGENCY MEDICINE    Why:  If symptoms worsen    Contact information:    [address] [facility] [id number] [phone]        Discharge Instructions       Take Motrin or Tylenol as needed for fever.  Return with new or worsening symptoms.  Practice good hand hygiene and cover your mouth when you cough.  Follow up with primary care provider.    Discharge References/Attachments     VIRAL SYNDROME (ADULT) (ENGLISH)    URI, VIRAL, NO ABX (ADULT) (ENGLISH)      24 Hour Appointment Hotline       To make an appointment at any [facility], call [phone] ([phone]). If you don't have a family doctor or clinic, we will help you find one. [facility] are conveniently located to serve the needs of you and your family.             Review of your medicines      Notice     You have not been prescribed any medications.            Orders Needing Specimen Collection     None      Pending Results     No orders found for last 3 day(s).            Pending Culture Results     No orders found for last 3 day(s).            Pending Results Instructions     If you had any lab results that were not  finalized at the time of your Discharge, you can call the ED Lab Result RN at [phone]. You will be contacted by this team for any positive Lab results or changes in treatment. The nurses are available 7 days a week from 10A to 6:30P.  You can leave a message 24 hours per day and they will return your call.        Test Results From Your Hospital Stay               Clinical Quality Measure: Blood Pressure Screening     Your blood pressure was checked while you were in the emergency department today. The last reading we obtained was  BP: (!) 159/98 . Please read the guidelines below about what these numbers mean and what you should do about them.  If your systolic blood pressure (the top number) is less than 120 and your diastolic blood pressure (the bottom number) is less than 80, then your blood pressure is normal. There is nothing more that you need to do about it.  If your systolic blood pressure (the top number) is 120-139 or your diastolic blood pressure (the bottom number) is 80-89, your blood pressure may be higher than it should be. You should have your blood pressure rechecked within a year by a primary care provider.  If your systolic blood pressure (the top number) is 140 or greater or your diastolic blood pressure (the bottom number) is 90 or greater, you may have high blood pressure. High blood pressure is treatable, but if left untreated over time it can put you at risk for heart attack, stroke, or kidney failure. You should have your blood pressure rechecked by a primary care provider within the next 4 weeks.  If your provider in the emergency department today gave you specific instructions to follow-up with your doctor or provider even sooner than that, you should follow that instruction and not wait for up to 4 weeks for your follow-up visit.        Thank you for choosing [facility]       Thank you for choosing [facility] for your care. Our goal is always to provide you with excellent care. Hearing  "back from our patients is one way we can continue to improve our services. Please take a few minutes to complete the written survey that you may receive in the mail after you visit with us. Thank you!        [facility] [employer] Information     [employer] lets you send messages to your doctor, view your test results, renew your prescriptions, schedule appointments and more. To sign up, go to [email or web address] . Click on \"Log in\" on the left side of the screen, which will take you to the Welcome page. Then click on \"Sign up Now\" on the right side of the page.     You will be asked to enter the access code listed below, as well as some personal information. Please follow the directions to create your username and password.     Your access code is: [email or web address]  Expires: 2018  1:26 AM     Your access code will  in 90 days. If you need help or a new code, please call your [facility] clinic or [phone].        Care EveryWhere ID     This is your Care EveryWhere ID. This could be used by other organizations to access your [facility] medical records  [id number]        Equal Access to Services     PAO HOPE : Hadlon Peters, karen abdi, silvino rucker, mario alcocer . So [facility] [phone].    ATENCIÓN: Si habla español, tiene a mcqueen disposición servicios gratuitos de asistencia lingüística. Rochelle al [phone].    We comply with applicable federal civil rights laws and [place] laws. We do not discriminate on the basis of race, color, national origin, age, disability, sex, sexual orientation, or gender identity.            After Visit Summary       This is your record. Keep this with you and show to your community pharmacist(s) and doctor(s) at your next visit.                  " Statement Selected

## 2019-01-17 DIAGNOSIS — F41.9 ANXIETY DISORDER, UNSPECIFIED: ICD-10-CM
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
## 2019-01-17 DIAGNOSIS — E86.0 DEHYDRATION: ICD-10-CM

## 2019-01-17 DIAGNOSIS — N39.0 URINARY TRACT INFECTION, SITE NOT SPECIFIED: ICD-10-CM

## 2019-01-17 DIAGNOSIS — I13.0 HYPERTENSIVE HEART AND CHRONIC KIDNEY DISEASE WITH HEART FAILURE AND STAGE 1 THROUGH STAGE 4 CHRONIC KIDNEY DISEASE, OR UNSPECIFIED CHRONIC KIDNEY DISEASE: ICD-10-CM

## 2019-01-17 DIAGNOSIS — E87.2 ACIDOSIS: ICD-10-CM

## 2019-01-17 DIAGNOSIS — D69.6 THROMBOCYTOPENIA, UNSPECIFIED: ICD-10-CM

## 2019-01-17 DIAGNOSIS — G92 TOXIC ENCEPHALOPATHY: ICD-10-CM

## 2019-01-17 DIAGNOSIS — R53.1 WEAKNESS: ICD-10-CM

## 2019-01-17 DIAGNOSIS — R33.9 RETENTION OF URINE, UNSPECIFIED: ICD-10-CM

## 2019-01-17 DIAGNOSIS — R31.9 HEMATURIA, UNSPECIFIED: ICD-10-CM

## 2019-01-17 DIAGNOSIS — E87.0 HYPEROSMOLALITY AND HYPERNATREMIA: ICD-10-CM

## 2019-01-17 DIAGNOSIS — T83.598A INFECTION AND INFLAMMATORY REACTION DUE TO OTHER PROSTHETIC DEVICE, IMPLANT AND GRAFT IN URINARY SYSTEM, INITIAL ENCOUNTER: ICD-10-CM

## 2019-01-17 DIAGNOSIS — Z85.828 PERSONAL HISTORY OF OTHER MALIGNANT NEOPLASM OF SKIN: ICD-10-CM

## 2019-01-17 DIAGNOSIS — E43 UNSPECIFIED SEVERE PROTEIN-CALORIE MALNUTRITION: ICD-10-CM

## 2019-01-17 DIAGNOSIS — N18.3 CHRONIC KIDNEY DISEASE, STAGE 3 (MODERATE): ICD-10-CM

## 2019-01-17 DIAGNOSIS — N36.8 OTHER SPECIFIED DISORDERS OF URETHRA: ICD-10-CM

## 2019-01-17 DIAGNOSIS — T42.6X1A POISONING BY OTHER ANTIEPILEPTIC AND SEDATIVE-HYPNOTIC DRUGS, ACCIDENTAL (UNINTENTIONAL), INITIAL ENCOUNTER: ICD-10-CM
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
## 2019-01-17 DIAGNOSIS — G62.9 POLYNEUROPATHY, UNSPECIFIED: ICD-10-CM

## 2019-01-17 DIAGNOSIS — N17.0 ACUTE KIDNEY FAILURE WITH TUBULAR NECROSIS: ICD-10-CM

## 2019-01-17 DIAGNOSIS — I50.9 HEART FAILURE, UNSPECIFIED: ICD-10-CM

## 2019-01-17 DIAGNOSIS — N40.0 BENIGN PROSTATIC HYPERPLASIA WITHOUT LOWER URINARY TRACT SYMPTOMS: ICD-10-CM

## 2019-01-17 DIAGNOSIS — F03.90 UNSPECIFIED DEMENTIA WITHOUT BEHAVIORAL DISTURBANCE: ICD-10-CM

## 2019-01-17 DIAGNOSIS — D63.8 ANEMIA IN OTHER CHRONIC DISEASES CLASSIFIED ELSEWHERE: ICD-10-CM

## 2019-01-17 DIAGNOSIS — R80.9 PROTEINURIA, UNSPECIFIED: ICD-10-CM
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
## 2019-01-17 DIAGNOSIS — F17.210 NICOTINE DEPENDENCE, CIGARETTES, UNCOMPLICATED: ICD-10-CM

## 2019-01-17 DIAGNOSIS — N26.1 ATROPHY OF KIDNEY (TERMINAL): ICD-10-CM

## 2020-01-15 NOTE — DISCHARGE NOTE ADULT - SECONDARY DIAGNOSIS.
Quality 431: Preventive Care And Screening: Unhealthy Alcohol Use - Screening: Patient screened for unhealthy alcohol use using a single question and scores 2 or greater episodes per year and brief intervention occurred Quality 226: Preventive Care And Screening: Tobacco Use: Screening And Cessation Intervention: Patient screened for tobacco use and is an ex/non-smoker Quality 111:Pneumonia Vaccination Status For Older Adults: Pneumococcal Vaccination Previously Received Quality 130: Documentation Of Current Medications In The Medical Record: Current Medications Documented Quality 47: Advance Care Plan: Advance Care Planning discussed and documented; advance care plan or surrogate decision maker documented in the medical record. Detail Level: Detailed Urinary retention Thrush, oral

## 2020-01-19 NOTE — PATIENT PROFILE ADULT. - AS SC BRADEN MOISTURE
impaired ability to control trunk for mobility/decreased ability to use arms for pushing/pulling/decreased ability to use legs for bridging/pushing
(3) occasionally moist

## 2020-11-05 NOTE — DISCHARGE NOTE ADULT - MEDICATION SUMMARY - MEDICATIONS TO CHANGE
I will SWITCH the dose or number of times a day I take the medications listed below when I get home from the hospital:  None Finasteride Counseling:  I discussed with the patient the risks of use of finasteride including but not limited to decreased libido, decreased ejaculate volume, gynecomastia, and depression. Women should not handle medication.  All of the patient's questions and concerns were addressed. Finasteride Male Counseling: Finasteride Counseling:  I discussed with the patient the risks of use of finasteride including but not limited to decreased libido, decreased ejaculate volume, gynecomastia, and depression. Women should not handle medication.  All of the patient's questions and concerns were addressed.

## 2020-12-11 NOTE — ED ADULT NURSE NOTE - DOES PATIENT HAVE ADVANCE DIRECTIVE
COVID-19 Screening:    • Does the patient OR patient’s household members have any of the following symptoms?  o Temperature: Fever ?100.0°F or ?37.8°C?  No  o Respiratory symptoms: New or worsening cough, shortness of breath, difficulty breathing, or sore throat? No  o GI symptoms: New onset of nausea, vomiting or diarrhea?  No  o Miscellaneous: New onset of loss of taste or smell, chills, repeated shaking with chills, muscle pain, headache, congestion or runny nose?  No  • Has the patient or a household member tested positive for COVID-19 in the last 14 days?  No  • Has the patient or a household member been tested for COVID-19 and are waiting for the results?  No     No

## 2021-01-25 NOTE — PROGRESS NOTE ADULT - SUBJECTIVE AND OBJECTIVE BOX
Subjective   Patient ID: Juan is a 11 year old male who is accompanied by:mother       Chief Complaint   Patient presents with   • Other     concerns regarding testicular pain    • Office Visit       HPI   Complains of testicles are high in the groin area, he felt uncomfortable when he touches the area.Noticed a \" while back\".-  Mom specified : about 1-2  weeks ago. On/off discomfort,no pain. Intermittent.When he gets up to stand he feels that something is there and is worried..No redness on the skin.  Some abdominal pain yesterday. Epigastric, no nausea or vomiting.  Appetite is  normal.BMs NL daily.  Sleeps well.  No trauma to the area.  In  thd grade, in person classes, started again 1 week ago.  Prefers to be at home and do e- learning. Today he missed school because he did not feel well ( groin and abdominal discomfort)  No recent illness, no fevers, no sick contacts.  Review of Systems   Constitutional: Negative.  Negative for activity change, fever and irritability.   HENT: Negative for congestion and rhinorrhea.    Eyes: Negative.    Respiratory: Negative for cough.    Gastrointestinal: Positive for abdominal pain (minor few days ago, epigastric, short lasting). Negative for abdominal distention, constipation, diarrhea, nausea and vomiting.   Genitourinary: Negative for decreased urine volume, enuresis and flank pain.   Musculoskeletal: Negative for gait problem.   Skin: Negative for pallor and rash.   Allergic/Immunologic: Negative for environmental allergies and food allergies.   Psychiatric/Behavioral: Negative for sleep disturbance.   All other systems reviewed and are negative.      Juan has a past medical history of Excessive anger, Laceration of face, Tonsillar enlargement, and Undescended testes.  Juan does not have a problem list on file.  Juan has no past surgical history on file.  His family history includes Allergic Rhinitis in his mother; Asthma in an other family member; Diabetes in  Chief Complaint/Reason for Admission: weakness, low blood pressure and hypoxia	    History of Present Illness: 	  Pt is an 81 y/o gentleman with a PMHx of HTN, BPH, anxiety and current smoking who was sent to the ER from Dr. Miller's office.  He was having weakness/loss of balance and tremors for 4 days with decreased oral intake.  The office reports pt was suddenly found to be  hypoxic to 67% on RA, with HR 31 and BP 80/60, Temp 99F.   EMS reported  pt had normal vital signs, but then en route pt's blood pressure dropped to 80/40, his O2 sat was fluctuating, and pt was cyanotic with +cough at baseline.    Pt has not been eating much for the last two days, due to poor appetite.  No abd complaints. He reports intermittent burning with urination.  His wife reports he is barely able to walk and has been stumbling.    Pt denies palpitations , chest pain, SOB, fever, chills, n/v, edema or calf pain.  He has a chronic wet cough and runny nose which family and pt reports is unchanged.       Pt received a PNA shot 2 weeks ago.  He does not like to see the doctor.    1/31: Patient is comfortably lying down, Venti - mask on sating high 80s. Denies any HA, CP, SOB. No overnight fevers, chills or shakes. vitals reviewed. pending V/Q scan today given patient being hypoxic.     2/1: seen and eval. hemodynamically stable. Clinically improving. Episodes of confusion secondary to sun-downing. Family says he is becoming more forgetful, still drives. Care extensively discussed with patient's family at the bedside.     REVIEW OF SYSTEMS:  as per HPI    Physical Exam:   GENERAL APPEARANCE:  elderly, deconditioned, frail  T(C): 36.6 (01 Feb 2018 10:00), Max: 36.7 (31 Jan 2018 20:32)  T(F): 97.9 (01 Feb 2018 10:00), Max: 98.1 (31 Jan 2018 20:32)  HR: 72 (01 Feb 2018 10:00) (62 - 100)  BP: 132/54 (01 Feb 2018 10:00) (108/43 - 143/55)  RR: 18 (01 Feb 2018 10:00) (17 - 18)  SpO2: 94% (01 Feb 2018 10:00) (92% - 95%)  HEENT:  Head is normocephalic    Skin:  dry  NECK:  no JVD  HEART:  Regular rate and rhythm. normal S1 and S2, No M/R/G  LUNGS:  decreased lung sounds /  ronchi  ABDOMEN:  Soft, nontender, nondistended with good bowel sounds heard  EXTREMITIES:  no swelling  NEUROLOGICAL:  limited neuro exam    Labs:       CBC Full  -  ( 01 Feb 2018 05:35 )  WBC Count : 7.2 K/uL  Hemoglobin : 9.8 g/dL  Hematocrit : 30.0 %  Platelet Count - Automated : 89 K/uL    142  |  108  |  60<H>  ----------------------------<  142<H>  4.3   |  23  |  3.19<H>    Ca    8.1<L>      31 Jan 2018 07:33    CT of the chest:   Scattered tree-in-bud opacities along the lungs may represent small   airways disease/infection. No dense consolidation is seen    No evidence of bowel obstruction.    Foci of high density along the urinary bladder posteriorly may represent   multiple layering bladder calculi versus wall calcifications. Correlate   with urologic examination    # Sepsis secondary to viral illness / CAP  - started on Tamiflu 30 mg po qdaily in a setting of renal failure  - ceftriaxone 1 gm IV qd / doxycycline 100 mg IV q 12h / oseltamivir 30 mg PO qd # 2  - Dupplex LE neg for DVT  - stop heparin drip.  given patient remained hypoxic overnight, we will do a V/Q scan today  - hold metoprolol and flomax for now  - care discussed with Dr. Santacruz, concern was raised in regards of the thrombocytopenia and patient being on heparin drip. If persistant hypoxia will do VQ scan in the am.     # acute on chronic hypoxic respiratory failure secondary to acute COPD excerebration / CAP / Acute Influenza causing BRONCHIOLITIS / Mucus plugging cont factor to hypoxemia  - Solumedrol 125mg given in the ER, will cont 40mg BID  - IV Rocephin / doxy start date 1/31  - cont nebs  - smoking cessation    # Elevated troponin secondary to demand ischemia secondary to flue  - normalized  - started on ASA/STATIN    # ARF (tremulousness likely due to Uremia) / r/o MM / obstruction  - cont fluids  - hold losartan  - US and CT bladder  - SPEP and UPEP, Bence Salgado proteins    # Thrombocytopenia, anemia, elevated ESR  - will closely trend labs  - plt 70s - 89s, continue trending    # DVT prophylaxis  - venodynes an other family member; Hypertension in his father; Other in his mother.  Juan reports that he has never smoked. He has never used smokeless tobacco.  Juan currently has no medications in their medication list.  Juan   No current outpatient medications on file.     No current facility-administered medications for this visit.      Juan has No Known Allergies.    Objective   Vitals:Temp 97.4 °F (36.3 °C) (Temporal)   Wt 35.9 kg (79 lb 0.6 oz)   Physical Exam  Vitals signs and nursing note reviewed.   Constitutional:       General: He is active.      Appearance: Normal appearance. He is well-developed and normal weight.      Comments: Pleasant and calm, NAD   HENT:      Head: Normocephalic and atraumatic.      Right Ear: Tympanic membrane, ear canal and external ear normal.      Left Ear: Tympanic membrane, ear canal and external ear normal.      Nose: Nose normal.      Mouth/Throat:      Mouth: Mucous membranes are moist.   Eyes:      Extraocular Movements: Extraocular movements intact.      Conjunctiva/sclera: Conjunctivae normal.      Pupils: Pupils are equal, round, and reactive to light.   Neck:      Musculoskeletal: Neck supple.      Comments: No neck masses  Cardiovascular:      Rate and Rhythm: Normal rate and regular rhythm.      Heart sounds: Normal heart sounds. No murmur.   Abdominal:      General: Abdomen is flat. Bowel sounds are normal. There is no distension.      Palpations: Abdomen is soft. There is no mass.      Tenderness: There is no abdominal tenderness.      Hernia: No hernia is present.      Comments: No HSM   Genitourinary:     Penis: Normal.       Comments: Circumcised, Lt testicle, in the scrotum T1, Rt- not able to bring down or palpate, G1,PH1  Musculoskeletal: Normal range of motion.   Lymphadenopathy:      Cervical: No cervical adenopathy.   Skin:     General: Skin is warm.      Coloration: Skin is not pale.      Findings: No erythema or rash.   Neurological:      Mental  Status: He is alert.      Sensory: No sensory deficit.      Coordination: Coordination normal.      Gait: Gait normal.   Psychiatric:         Mood and Affect: Mood normal.         Behavior: Behavior normal.         Assessment   Problem List Items Addressed This Visit     None      Visit Diagnoses     Unilateral undescended testicle, unspecified location    -  Primary    Relevant Orders    SERVICE TO PEDIATRIC UROLOGY        Child, pre pubertal complains of discomfort in the RT and Lt lower abdominal area, he feels that his testicles may be hurting but he points out to the area above the inguinal area.  No abdominal masses, tenderness in the area.  His Lt testicle is palpable in the scrotum, not able to palpate the right testes in the scrotum or inguinal canal.  The remaining of the physical exam is normal.  The child was referred to urology, names of the physicians and tel # to register were provided.   Other recommendations- in AVS.  Instructed to call if problem worsens  otherwise follow-up prn

## 2021-04-06 NOTE — PATIENT PROFILE ADULT. - NS SC CAGE ALCOHOL CUT DOWN
33w1d  Received BS log for date range 3/18-4/4     Low  High Out of Range   Fasting Blood Sugar 95 112 17 out of 18   Post Breakfast 110 132 13 out of 18   Post Lunch 97 140 10 out of 18   Post Dinner 114 131 14 out of 18     Patient is currently taking Jovita Ates no

## 2022-02-10 NOTE — DISCHARGE NOTE ADULT - IF YOU ARE A SMOKER, IT IS IMPORTANT FOR YOUR HEALTH TO STOP SMOKING. PLEASE BE AWARE THAT SECOND HAND SMOKE IS ALSO HARMFUL.
pt's niece Navya made decision to sign MOLTS form   pt is with demnetia delirium   known advanced age comorbidities she made her DNR Patient has advanced Dementia admitted after falling at her Asst Living Facility in their " Memory UNit"    She barely recognizes the few family she still has.  [place] was waiting at Patient's bedside to speak with Palliative/  to explore possible discharge plan, once medically discharged. Today after sleeping well all night fir the first time, Patient much calmer, was fed and ate better.  Saying a few words, making moie sense.    Until now, we had very little hope Sandra would emerge from this agitated delerious  state-needing 1:1 supv.  Today was her best day. Sleeping after getting up with PT sitting on side of bed-immediately needed to lie down overcome with fatigue or possible pain.  She has not been a reliable self  of her pain  Nursing staff are using a specific Pain assessment scale for Cognitively impaired Patients.  Last given Seroquel yesterday. Doing better on Risperdone > daily dose to 0.5mg  Gabe Albert joined us on the phone. She works as a Dietician at [employer] and is trying to get a bed for her in their rehab unit,  and will try to get permission to have a  Nsg asst with her for safety.  Patient must less agitated and restless- Agitation is lifting    Also discussed MOLST Directive in particular, adding a DNI to the Direcitve. Explained you can opt for a trial of Non invasive  BIPAP too, knowing that would be last resusitative technique offered  MOL reconfirmed added DNI Statement Selected

## 2022-03-07 NOTE — PHYSICAL THERAPY INITIAL EVALUATION ADULT - WORK/LEISURE ACTIVITY, REHAB EVAL
OB Vaginal Delivery Note    Danette Miranda MRN# [id number]   Age: 31 year old YOB: 1990       GA: 39w3d  GP:   Labor Complications: None   EBL:   mL  Delivery QBL:    Delivery Type: Vaginal, Spontaneous   ROM to Delivery Time: (Delivered) Hours: 8 Minutes: 43  New Site Weight: 3.549 kg (7 lb 13.2 oz)    1 Minute 5 Minute 10 Minute   Apgar Totals: 9   9        AUSTIN DEAL     Delivery Details:  Description of Delivery:    Patient is a 31 year old yo G4 now  presenting with induction at 39w3d.  She was induced electively with pitocin and progressed in labor with AROM after initiating GBS prophylaxis.  Epidural analgesia was successfully placed and Pitocin augmentation given for dysfunctional labor.  She ultimately progressed to complete and pushed a total of approx 15 min delivering a liveborn male. Nuccal cord x 2 was noted and reduced.  Anterior shoulder delivered easily with  No maneuvers followed by the remainder of the baby and it was placed on the maternal abdomen. Placenta was delivered 5 minutes later intact with a 3 vessel cord.  There was a brief episode of postpartum uterine atony managed with IV pitocin and massage.  A 1st degree midline perineal laceration and a eva-urethral lac bilaterally was noted but did not require repair.  The remainder of the birth canal was intact and rectal was negative with normal sphincter tone following the repair. All counts were correct following the repair. No complications occurred. Mom and baby are currently stable.  She is A POS and rubella immune. EBL was less than 500 ml.         Madhavi Miranda [[id number]]    Labor Event Times    Labor onset date: 3/7/22 Onset time:  8:50 AM   Dilation complete date: 3/7/22 Complete time:  5:20 PM   Start pushing date/time: 3/7/2022 1720      Labor Length    1st Stage (hrs): 8 (min): 30   2nd Stage (hrs): 0 (min): 13   3rd Stage (hrs): 0 (min): 3      Labor Events     labor?:  No   steroids: None  Labor Type: Induction/Cervical ripening  Predominate monitoring during 1st stage: continuous electronic fetal monitoring     Antibiotics received during labor?: Yes  Reason for Antibiotics: GBS  Antibiotics received for GBS: Penicillin  Antibiotics Given (GBS): Greater than 4 hours prior to delivery     Rupture identifier: Sac 1  Rupture date/time: 3/7/22 0850   Rupture type: Artificial Rupture of Membranes  Fluid color: Clear  Fluid odor: Normal     Induction: Oxytocin, AROM  Induction date/time: 3/7/22 0630   Cervical ripening date/time:     Indications for induction: Elective     Augmentation: Oxytocin  Indications for augmentation: Ineffective Contraction Pattern  1:1 continuous labor support provided by?: RN Labor partogram used?: no      Delivery/Placenta Date and Time    Delivery Date: 3/7/22 Delivery Time:  5:33 PM   Placenta Date/Time: 3/7/2022  5:36 PM  Oxytocin given at the time of delivery: after delivery of baby  Delivering clinician: Kayode Kearney MD   Other personnel present at delivery:  Provider Role   Liz Peña Student         Vaginal Counts     Initial count performed by 2 team members:  Two Team Members   cosmo delacruz nst       Needles Suture Needles Sponges (RETIRED) Instruments   Initial counts 1  5    Added to count       Relief counts       Final counts 1  5          Placed during labor Accounted for at the end of labor   FSE No NA   IUPC Yes Yes   Cervadil No NA              Final count performed by 2 team members:  Two Team Members   cosmo delacruz rn      Final count correct?: Yes     Apgars    Living status: Living   1 Minute 5 Minute 10 Minute 15 Minute 20 Minute   Skin color: 1  1       Heart rate: 2  2       Reflex irritability: 2  2       Muscle tone: 2  2       Respiratory effort: 2  2       Total: 9  9       Apgars assigned by: COSMO [email or web address]     Cord    Vessels: 3 Vessels    Cord Complications: Nuchal   Nuchal Intervention: reduced     "     Nuchal cord description: loose nuchal cord         Cord Blood Disposition: Lab    Gases Sent?: No    Delayed cord clamping?: Yes    Cord Clamping Delay (seconds): 31-60 seconds    Stem cell collection?: No       Bath Resuscitation    Methods: None      Measurements    Weight: 7 lb 13.2 oz Length: 1' 8.25\"   Head circumference: 33 cm Chest circumference: 33.7 cm      Skin to Skin and Feeding Plan    Skin to skin initiation date/time: 1/3/1841    Skin to skin with: Mother  Skin to skin end date/time:     Breastfeeding initiated date/time: 3/7/2022 175     Labor Events and Shoulder Dystocia    Fetal Tracing Prior to Delivery: Category 1  Shoulder dystocia present?: Neg     Delivery (Maternal) (Provider to Complete) ([id number])    Episiotomy: None  Perineal lacerations: 1st Repaired?: No   Periurethral laceration: bilateral Repaired?: No   Repair suture: None  Genital tract inspection done: Pos     Blood Loss  Mother: Chano Miranda #[id number]   Start of Mother's Information    Delivery Blood Loss  22 0850 - 22 0505    Postpartum QBL (mL) Hospital Encounter 135 mL    Total  135 mL         End of Mother's Information  Mother: Chano Miranda #[id number]          Delivery - Provider to Complete ([id number])    Delivering clinician: Kayode Kearney MD  Attempted Delivery Types (Choose all that apply): Spontaneous Vaginal Delivery  Delivery Type (Choose the 1 that will go to the Birth History): Vaginal, Spontaneous                   Other personnel:  Provider Role   Liz Peña Student                Placenta    Date/Time: 3/7/2022  5:36 PM  Removal: Spontaneous  Disposition: Hospital disposal           Anesthesia    Method: Epidural  Cervical dilation at placement: 0-3                Presentation and Position    Presentation: Vertex    Position: Right Occiput Posterior                 Kaydoe Kearney MD  " independent

## 2022-04-25 NOTE — ASU PATIENT PROFILE, ADULT - NS SC CAGE ALCOHOL EYE OPENER
I will place an order for her prior to her appt.  Is she going to the appt or are they coming to her?   no

## 2022-04-30 ENCOUNTER — INPATIENT (INPATIENT)
Facility: HOSPITAL | Age: 87
LOS: 3 days | Discharge: HOME | DRG: 698 | End: 2022-05-04
Attending: FAMILY MEDICINE | Admitting: FAMILY MEDICINE
Payer: MEDICARE

## 2022-04-30 VITALS
SYSTOLIC BLOOD PRESSURE: 82 MMHG | HEIGHT: 72 IN | OXYGEN SATURATION: 94 % | TEMPERATURE: 98 F | HEART RATE: 59 BPM | RESPIRATION RATE: 21 BRPM | DIASTOLIC BLOOD PRESSURE: 63 MMHG

## 2022-04-30 DIAGNOSIS — Z93.59 OTHER CYSTOSTOMY STATUS: Chronic | ICD-10-CM
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
## 2022-04-30 DIAGNOSIS — R00.1 BRADYCARDIA, UNSPECIFIED: ICD-10-CM

## 2022-04-30 DIAGNOSIS — C4A.31 MERKEL CELL CARCINOMA OF NOSE: Chronic | ICD-10-CM

## 2022-04-30 LAB
ALBUMIN SERPL ELPH-MCNC: 2.7 G/DL — LOW (ref 3.3–5)
ALP SERPL-CCNC: 86 U/L — SIGNIFICANT CHANGE UP (ref 40–120)
ALT FLD-CCNC: 12 U/L — SIGNIFICANT CHANGE UP (ref 12–78)
ANION GAP SERPL CALC-SCNC: 8 MMOL/L — SIGNIFICANT CHANGE UP (ref 5–17)
APPEARANCE UR: ABNORMAL
AST SERPL-CCNC: 14 U/L — LOW (ref 15–37)
BILIRUB SERPL-MCNC: 1 MG/DL — SIGNIFICANT CHANGE UP (ref 0.2–1.2)
BILIRUB UR-MCNC: NEGATIVE — SIGNIFICANT CHANGE UP
BUN SERPL-MCNC: 55 MG/DL — HIGH (ref 7–23)
CALCIUM SERPL-MCNC: 9.1 MG/DL — SIGNIFICANT CHANGE UP (ref 8.5–10.1)
CHLORIDE SERPL-SCNC: 103 MMOL/L — SIGNIFICANT CHANGE UP (ref 96–108)
CO2 SERPL-SCNC: 23 MMOL/L — SIGNIFICANT CHANGE UP (ref 22–31)
COLOR SPEC: YELLOW — SIGNIFICANT CHANGE UP
CREAT SERPL-MCNC: 3.94 MG/DL — HIGH (ref 0.5–1.3)
DIFF PNL FLD: ABNORMAL
EGFR: 14 ML/MIN/1.73M2 — LOW
GLUCOSE SERPL-MCNC: 104 MG/DL — HIGH (ref 70–99)
GLUCOSE UR QL: NEGATIVE — SIGNIFICANT CHANGE UP
HCT VFR BLD CALC: 29.8 % — LOW (ref 39–50)
HGB BLD-MCNC: 9.3 G/DL — LOW (ref 13–17)
KETONES UR-MCNC: NEGATIVE — SIGNIFICANT CHANGE UP
LACTATE SERPL-SCNC: 1.4 MMOL/L — SIGNIFICANT CHANGE UP (ref 0.7–2)
LEUKOCYTE ESTERASE UR-ACNC: ABNORMAL
MCHC RBC-ENTMCNC: 29 PG — SIGNIFICANT CHANGE UP (ref 27–34)
MCHC RBC-ENTMCNC: 31.2 GM/DL — LOW (ref 32–36)
MCV RBC AUTO: 92.8 FL — SIGNIFICANT CHANGE UP (ref 80–100)
NITRITE UR-MCNC: POSITIVE
PH UR: 5 — SIGNIFICANT CHANGE UP (ref 5–8)
PLATELET # BLD AUTO: 122 K/UL — LOW (ref 150–400)
POTASSIUM SERPL-MCNC: 4.9 MMOL/L — SIGNIFICANT CHANGE UP (ref 3.5–5.3)
POTASSIUM SERPL-SCNC: 4.9 MMOL/L — SIGNIFICANT CHANGE UP (ref 3.5–5.3)
PROT SERPL-MCNC: 7.5 GM/DL — SIGNIFICANT CHANGE UP (ref 6–8.3)
PROT UR-MCNC: 30 MG/DL
RBC # BLD: 3.21 M/UL — LOW (ref 4.2–5.8)
RBC # FLD: 14.4 % — SIGNIFICANT CHANGE UP (ref 10.3–14.5)
SODIUM SERPL-SCNC: 134 MMOL/L — LOW (ref 135–145)
SP GR SPEC: 1.01 — SIGNIFICANT CHANGE UP (ref 1.01–1.02)
UROBILINOGEN FLD QL: NEGATIVE — SIGNIFICANT CHANGE UP
WBC # BLD: 19.1 K/UL — HIGH (ref 3.8–10.5)
WBC # FLD AUTO: 19.1 K/UL — HIGH (ref 3.8–10.5)

## 2022-04-30 PROCEDURE — 92610 EVALUATE SWALLOWING FUNCTION: CPT | Mod: GN

## 2022-04-30 PROCEDURE — 81001 URINALYSIS AUTO W/SCOPE: CPT

## 2022-04-30 PROCEDURE — 76770 US EXAM ABDO BACK WALL COMP: CPT

## 2022-04-30 PROCEDURE — 80048 BASIC METABOLIC PNL TOTAL CA: CPT

## 2022-04-30 PROCEDURE — 93306 TTE W/DOPPLER COMPLETE: CPT

## 2022-04-30 PROCEDURE — 97116 GAIT TRAINING THERAPY: CPT | Mod: GP

## 2022-04-30 PROCEDURE — 99223 1ST HOSP IP/OBS HIGH 75: CPT

## 2022-04-30 PROCEDURE — 82746 ASSAY OF FOLIC ACID SERUM: CPT

## 2022-04-30 PROCEDURE — 97530 THERAPEUTIC ACTIVITIES: CPT | Mod: GP

## 2022-04-30 PROCEDURE — 99285 EMERGENCY DEPT VISIT HI MDM: CPT | Mod: CS

## 2022-04-30 PROCEDURE — 93005 ELECTROCARDIOGRAM TRACING: CPT

## 2022-04-30 PROCEDURE — 70450 CT HEAD/BRAIN W/O DYE: CPT

## 2022-04-30 PROCEDURE — 84443 ASSAY THYROID STIM HORMONE: CPT

## 2022-04-30 PROCEDURE — 76770 US EXAM ABDO BACK WALL COMP: CPT | Mod: 26

## 2022-04-30 PROCEDURE — 82607 VITAMIN B-12: CPT

## 2022-04-30 PROCEDURE — 36415 COLL VENOUS BLD VENIPUNCTURE: CPT

## 2022-04-30 PROCEDURE — 71045 X-RAY EXAM CHEST 1 VIEW: CPT | Mod: 26

## 2022-04-30 PROCEDURE — 97162 PT EVAL MOD COMPLEX 30 MIN: CPT | Mod: GP

## 2022-04-30 PROCEDURE — 85027 COMPLETE CBC AUTOMATED: CPT

## 2022-04-30 RX ORDER — TAMSULOSIN HYDROCHLORIDE 0.4 MG/1
0.4 CAPSULE ORAL AT BEDTIME
Refills: 0 | Status: DISCONTINUED | OUTPATIENT
Start: 2022-04-30 | End: 2022-05-04

## 2022-04-30 RX ORDER — SODIUM CHLORIDE 9 MG/ML
1000 INJECTION INTRAMUSCULAR; INTRAVENOUS; SUBCUTANEOUS ONCE
Refills: 0 | Status: COMPLETED | OUTPATIENT
Start: 2022-04-30 | End: 2022-04-30

## 2022-04-30 RX ORDER — ONDANSETRON 8 MG/1
4 TABLET, FILM COATED ORAL EVERY 8 HOURS
Refills: 0 | Status: DISCONTINUED | OUTPATIENT
Start: 2022-04-30 | End: 2022-05-04

## 2022-04-30 RX ORDER — CEFEPIME 1 G/1
1000 INJECTION, POWDER, FOR SOLUTION INTRAMUSCULAR; INTRAVENOUS EVERY 12 HOURS
Refills: 0 | Status: DISCONTINUED | OUTPATIENT
Start: 2022-04-30 | End: 2022-04-30

## 2022-04-30 RX ORDER — CEFTRIAXONE 500 MG/1
1000 INJECTION, POWDER, FOR SOLUTION INTRAMUSCULAR; INTRAVENOUS EVERY 24 HOURS
Refills: 0 | Status: DISCONTINUED | OUTPATIENT
Start: 2022-05-01 | End: 2022-05-04

## 2022-04-30 RX ORDER — LOSARTAN POTASSIUM 100 MG/1
100 TABLET, FILM COATED ORAL DAILY
Refills: 0 | Status: DISCONTINUED | OUTPATIENT
Start: 2022-04-30 | End: 2022-04-30

## 2022-04-30 RX ORDER — VANCOMYCIN HCL 1 G
1000 VIAL (EA) INTRAVENOUS ONCE
Refills: 0 | Status: COMPLETED | OUTPATIENT
Start: 2022-04-30 | End: 2022-04-30

## 2022-04-30 RX ORDER — METOPROLOL TARTRATE 50 MG
75 TABLET ORAL DAILY
Refills: 0 | Status: DISCONTINUED | OUTPATIENT
Start: 2022-04-30 | End: 2022-05-04

## 2022-04-30 RX ORDER — ACETAMINOPHEN 500 MG
650 TABLET ORAL EVERY 6 HOURS
Refills: 0 | Status: DISCONTINUED | OUTPATIENT
Start: 2022-04-30 | End: 2022-05-04

## 2022-04-30 RX ORDER — HEPARIN SODIUM 5000 [USP'U]/ML
5000 INJECTION INTRAVENOUS; SUBCUTANEOUS EVERY 8 HOURS
Refills: 0 | Status: DISCONTINUED | OUTPATIENT
Start: 2022-04-30 | End: 2022-05-04

## 2022-04-30 RX ORDER — TAMSULOSIN HYDROCHLORIDE 0.4 MG/1
1 CAPSULE ORAL
Qty: 0 | Refills: 0 | DISCHARGE

## 2022-04-30 RX ORDER — CEFTRIAXONE 500 MG/1
1000 INJECTION, POWDER, FOR SOLUTION INTRAMUSCULAR; INTRAVENOUS ONCE
Refills: 0 | Status: COMPLETED | OUTPATIENT
Start: 2022-04-30 | End: 2022-04-30

## 2022-04-30 RX ORDER — LANOLIN ALCOHOL/MO/W.PET/CERES
3 CREAM (GRAM) TOPICAL AT BEDTIME
Refills: 0 | Status: DISCONTINUED | OUTPATIENT
Start: 2022-04-30 | End: 2022-05-04

## 2022-04-30 RX ORDER — SODIUM CHLORIDE 9 MG/ML
1000 INJECTION INTRAMUSCULAR; INTRAVENOUS; SUBCUTANEOUS
Refills: 0 | Status: DISCONTINUED | OUTPATIENT
Start: 2022-04-30 | End: 2022-05-04

## 2022-04-30 RX ADMIN — TAMSULOSIN HYDROCHLORIDE 0.4 MILLIGRAM(S): 0.4 CAPSULE ORAL at 22:10

## 2022-04-30 RX ADMIN — SODIUM CHLORIDE 50 MILLILITER(S): 9 INJECTION INTRAMUSCULAR; INTRAVENOUS; SUBCUTANEOUS at 18:04

## 2022-04-30 RX ADMIN — HEPARIN SODIUM 5000 UNIT(S): 5000 INJECTION INTRAVENOUS; SUBCUTANEOUS at 22:10

## 2022-04-30 RX ADMIN — Medication 75 MILLIGRAM(S): at 22:10

## 2022-04-30 RX ADMIN — Medication 3 MILLIGRAM(S): at 22:10

## 2022-04-30 RX ADMIN — Medication 250 MILLIGRAM(S): at 18:04

## 2022-04-30 RX ADMIN — SODIUM CHLORIDE 1000 MILLILITER(S): 9 INJECTION INTRAMUSCULAR; INTRAVENOUS; SUBCUTANEOUS at 13:00

## 2022-04-30 RX ADMIN — CEFTRIAXONE 100 MILLIGRAM(S): 500 INJECTION, POWDER, FOR SOLUTION INTRAMUSCULAR; INTRAVENOUS at 13:00

## 2022-04-30 NOTE — PATIENT PROFILE ADULT - FALL HARM RISK - HARM RISK INTERVENTIONS
Assistance with ambulation/Assistance OOB with selected safe patient handling equipment/Communicate Risk of Fall with Harm to all staff/Discuss with provider need for PT consult/Monitor for mental status changes/Monitor gait and stability/Move patient closer to nurses' station/Provide patient with walking aids - walker, cane, crutches/Reinforce activity limits and safety measures with patient and family/Reorient to person, place and time as needed/Tailored Fall Risk Interventions/Toileting schedule using arm’s reach rule for commode and bathroom/Use of alarms - bed, chair and/or voice tab/Visual Cue: Yellow wristband and red socks/Bed in lowest position, wheels locked, appropriate side rails in place/Call bell, personal items and telephone in reach/Instruct patient to call for assistance before getting out of bed or chair/Non-slip footwear when patient is out of bed/[place] to call system/Physically safe environment - no spills, clutter or unnecessary equipment/Purposeful Proactive Rounding/Room/bathroom lighting operational, light cord in reach

## 2022-04-30 NOTE — H&P ADULT - NSHPPHYSICALEXAM_GEN_ALL_CORE
ICU Vital Signs Last 24 Hrs  T(C): 36.4 (30 Apr 2022 15:55), Max: 37.3 (30 Apr 2022 10:39)  T(F): 97.5 (30 Apr 2022 15:55), Max: 99.2 (30 Apr 2022 10:39)  HR: 54 (30 Apr 2022 15:55) (54 - 59)  BP: 144/79 (30 Apr 2022 15:55) (82/63 - 144/79)  BP(mean): --  ABP: --  ABP(mean): --  RR: 14 (30 Apr 2022 15:55) (14 - 21)  SpO2: 98% (30 Apr 2022 15:55) (94% - 98%)      PHYSICAL EXAM:    Constitutional: NAD, awake and alert  HEENT: PERR, EOMI, Normal Hearing, MMM  Neck: Soft and supple, No LAD, No JVD  Respiratory: Breath sounds are clear bilaterally, No wheezing, rales or rhonchi  Cardiovascular: S1 and S2, regular rate and rhythm, no Murmurs, gallops or rubs  Gastrointestinal: Bowel Sounds present, soft, nontender, nondistended, no guarding, no rebound  Extremities: No peripheral edema  Vascular: 2+ peripheral pulses  Neurological: A/O x 3, no focal deficits  Musculoskeletal: 5/5 strength b/l upper and lower extremities  Skin: No rashes ICU Vital Signs Last 24 Hrs  T(C): 36.4 (30 Apr 2022 15:55), Max: 37.3 (30 Apr 2022 10:39)  T(F): 97.5 (30 Apr 2022 15:55), Max: 99.2 (30 Apr 2022 10:39)  HR: 54 (30 Apr 2022 15:55) (54 - 59)  BP: 144/79 (30 Apr 2022 15:55) (82/63 - 144/79)  BP(mean): --  ABP: --  ABP(mean): --  RR: 14 (30 Apr 2022 15:55) (14 - 21)  SpO2: 98% (30 Apr 2022 15:55) (94% - 98%)      PHYSICAL EXAM:    Constitutional: NAD, awake and alert  HEENT: PERR, EOMI, Normal Hearing, MMM  Neck: Soft and supple, No LAD, No JVD  Respiratory: Breath sounds are clear bilaterally, No wheezing, rales or rhonchi  Cardiovascular: S1 and S2, regular rate and rhythm, no Murmurs, gallops or rubs  Gastrointestinal: Bowel Sounds present, soft, nontender, nondistended, no guarding, no rebound  Extremities: No peripheral edema  Vascular: 2+ peripheral pulses  Neurological: A/O x 3, no focal deficits  Musculoskeletal: 5/5 strength b/l upper and lower extremities  Skin: small < 1cm stage I to coccyx

## 2022-04-30 NOTE — ED PROVIDER NOTE - ENMT, MLM
Airway patent, Nasal mucosa clear. dry mucus membranes. Throat has no vesicles, no oropharyngeal exudates and uvula is midline.

## 2022-04-30 NOTE — ED ADULT NURSE NOTE - OBJECTIVE STATEMENT
Patient brought in by ambulance for lethargy this AM as per wife , patient also normally confused as per wife. SP tube draining berto urine.

## 2022-04-30 NOTE — ED PROVIDER NOTE - NSICDXPASTSURGICALHX_GEN_ALL_CORE_FT
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

## 2022-04-30 NOTE — PATIENT PROFILE ADULT - NSPROPTRIGHTBILLOFRIGHTS_GEN_A_NUR
Patient instructed to keep leg elevated and use compression hose or ace wrap to help with swelling . Offered to order compression stocking but patient declined . Instructed patient that if elevation and compression does not improve the swelling to contact the office . Patient verbalized understanding . patient representative

## 2022-04-30 NOTE — H&P ADULT - NSHPLABSRESULTS_GEN_ALL_CORE
CBC:            9.3    19.10 )-----------( 122      ( 04-30-22 @ 10:51 )             29.8         Chem:         ( 04-30-22 @ 10:51 )    134<L>  |  103  |  55<H>  ----------------------------<  104<H>  4.9   |  23  |  3.94<H>        Liver Functions: ( 04-30-22 @ 10:51 )  Alb: 2.7 g/dL / Pro: 7.5 gm/dL / ALK PHOS: 86 U/L / ALT: 12 U/L / AST: 14 U/L / GGT: x              Type & Screen:    CXr: L sided infiltrate awaiting final read

## 2022-04-30 NOTE — ED PROVIDER NOTE - CLINICAL SUMMARY MEDICAL DECISION MAKING FREE TEXT BOX
pt with a hx of HTN, suprapubic tube here with weakness and increased difficulty speaking for 2 days. rule out sepsis and pt also has some pauses on his telemetry will do labs, give antibiotics, 2 troponin and place on telemetry

## 2022-04-30 NOTE — H&P ADULT - ASSESSMENT
#Metabolic encephalopathy/ Lethargy 2/2 to sepsis POA 2/2 to PNA. Rule out complicated UTI  #Bradycardia  #ZELDA on CKD III  - monitor on telemetry for 24 hours  - per ED, noteable pauses however no tele strips to verify  - Will lower dose of metoprolol to 75 qd with holding parameters  - 2D echo. Check tsh  - blood cultures  - urology consult for SP change out  - s/p cefepime in ED  - will continue cefepime for now to cover gn rods and other atyicals  - Scr 3.9 baseline 1.5 in 2019   - gentle hydration  - kidney sono      BPH   -continue flomax    HTN:   -hold losartan  -continue toprol Xl with parameters    DVT proph  -heparin               - #Metabolic encephalopathy/ Lethargy 2/2 to sepsis POA 2/2 to PNA. Rule out complicated UTI  #Bradycardia  #ZELDA on CKD III  - monitor on telemetry for 24 hours  - per ED, noteable pauses however no tele strips to verify  - Will lower dose of metoprolol to 75 qd with holding parameters  - 2D echo. Check tsh  - blood cultures  - urology consult for SP change out - will ask wife when was last exchange  - s/p cefepime in ED  - will continue cefepime for now to cover gn rods and other atyicals  - Scr 3.9 baseline 3.2 in 2020  - gentle hydration  - kidney sono      BPH   -continue flomax    HTN:   -hold losartan for now  -continue toprol Xl with parameters    DVT proph  -heparin               - #Metabolic encephalopathy/ Lethargy 2/2 to sepsis POA 2/2 to PNA. Rule out complicated UTI  #Bradycardia  #ZELDA on CKD III  - monitor on telemetry for 24 hours  - per ED, noteable pauses however no tele strips to verify  - Will lower dose of metoprolol to 75 qd with holding parameters  - 2D echo. Check tsh  - blood cultures  - urology consult forpossible SP change out - will ask wife when was last exchange  - s/p ceftriaxone in ED  - No clear source for leukocytosis. CXR clear. Covid negative. No diarrhea. Possible viral etiology?......   - will hold off on abx for now   - UA will have poor yield due to indwelling catheter / colonization  - check procalcitonin  - Scr 3.9 baseline 3.2 in 2020  - gentle hydration  - kidney sono      BPH   -continue flomax    HTN:   -hold losartan for now  -continue toprol Xl with parameters    DVT proph  -heparin               - #Metabolic encephalopathy/ Lethargy. Meeting SIRS criteria. Rule out complicated UTI. Viral etiology?... Pyuria?...  #Bradycardia/Sinus pauses  #ZELDA on CKD III  - monitor on telemetry for 24 hours  - per ED, noteable pauses however no tele strips to verify  - Will lower dose of metoprolol to 75 qd with holding parameters  - 2D echo. Check tsh  - blood cultures  - urology consult forpossible SP change out - will ask wife when was last exchange  - s/p ceftriaxone in ED  - No clear source for leukocytosis. CXR clear. Covid negative. No diarrhea. Possible viral etiology?......   - will hold off on abx  for now   - UA will have poor yield due to indwelling catheter / colonization  - check procalcitonin  - Scr 3.9 baseline 3.2 in 2020  - gentle hydration  - kidney sono      BPH   -continue flomax    HTN:   -hold losartan for now  -continue toprol Xl with parameters    DVT proph  -heparin               - #Metabolic encephalopathy/ Lethargy. Meeting SIRS criteria. Rule out complicated UTI.  Pyuria?...  #Bradycardia/Sinus pauses  #ZELDA on CKD III  - monitor on telemetry for 24 hours  - per ED, noteable pauses however no tele strips to verify  - Will lower dose of metoprolol to 75 qd with holding parameters  - 2D echo. Check tsh  - blood cultures  - urology consult : pt sees matinis  - s/p ceftriaxone in ED. Will continue with this for now  - No clear source for leukocytosis. CXR clear. Covid negative. No diarrhea. Possible viral etiology?......   - UA will have poor yield due to indwelling catheter / colonization  - check procalcitonin  - Scr 3.9 baseline 3.2 in 2020  - gentle hydration  - kidney sono      BPH   -continue flomax    HTN:   -hold losartan for now  -continue toprol Xl with parameters    DVT proph  -heparin               -

## 2022-04-30 NOTE — ED PROVIDER NOTE - NSICDXPASTMEDICALHX_GEN_ALL_CORE_FT
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    Memory deficit     Pedal edema b/l    Poor historian     Urinary retention

## 2022-04-30 NOTE — H&P ADULT - HISTORY OF PRESENT ILLNESS
87 M hx HTN on Metoprolol, BPH, early dementia anxiety, SP catheter brought in by wife as pt's wife states that pt has been weak for the last 2 days and she has been unable to get him up.  per wife also states that pt has had more trouble talking, but states that this is acute on chronic. no reported fever, vomiting, diarrhea. wife also reports pt has suprapubic catheter. no hx of heart attack, stroke. no hx of parkinson's. smoker.    ED course: CXR wet read-> LLL infiltrate. UA pending. Lactate normal .WBC 19. Scr 3.9. 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    Memory deficit     Pedal edema b/l    Poor historian     Urinary retention.     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     FAMILY HISTORY:  No pertinent family history in first degree relatives.    · Social Concerns: None     Tobacco Usage:  · Tobacco Usage	Current some day smoker     87 M hx HTN on Metoprolol, BPH, early dementia anxiety, SP catheter brought in by wife as pt's wife states that pt has been weak for the last 2 days and she has been unable to get him up.  per wife also states that pt has had more trouble talking, but states that this is acute on chronic. no reported fever, vomiting, diarrhea. wife also reports pt has suprapubic catheter. no hx of heart attack, stroke. no hx of parkinson's. smoker.    ED course: CXR wet read-> clear. UA pending however has SP catheter. Lactate normal .WBC 19. Scr 3.9. s/p ceftriaxone. LFTs nl. COVID negative. No e/o hypoxia and no e/o hypotension.           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    Memory deficit     Pedal edema b/l    Poor historian     Urinary retention.     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     FAMILY HISTORY:  No pertinent family history in first degree relatives.    · Social Concerns: None     Tobacco Usage:  · Tobacco Usage	Current some day smoker     87 M hx HTN on Metoprolol, BPH, early dementia anxiety, SP catheter brought in by wife as pt's wife states that pt has been weak for the last 2 days and she has been unable to get him up.  per wife also states that pt has had more trouble talking, but states that this is acute on chronic. no reported fever, vomiting, diarrhea. wife also reports pt has suprapubic catheter. no hx of heart attack, stroke. no hx of parkinson's. smoker. Wife at bedside, state change out was 3 weeks ago. Since then urine dark with sediment and strong smelling.     ED course: CXR wet read-> clear. UA pending however has SP catheter. Lactate normal .WBC 19. Scr 3.9. s/p ceftriaxone. LFTs nl. COVID negative. No e/o hypoxia and no e/o hypotension.           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    Memory deficit     Pedal edema b/l    Poor historian     Urinary retention.     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     FAMILY HISTORY:  No pertinent family history in first degree relatives.    · Social Concerns: None     Tobacco Usage:  · Tobacco Usage	Current some day smoker

## 2022-04-30 NOTE — ED PROVIDER NOTE - OBJECTIVE STATEMENT
87 M hx HTN on Metoprolol, BPH, anxiety brought in by wife as pt's wife states that pt has been weak for the last 2 days and she has been unable to get him up.  per wife also states that pt has had more trouble talking, but states that this is acute on chronic. no reported fever, vomiting, diarrhea. wife also reports pt has suprapubic catheter. no hx of heart attack, stroke. no hx of parkinson's. smoker. PMD Dr. Miller.

## 2022-04-30 NOTE — ED ADULT TRIAGE NOTE - CHIEF COMPLAINT QUOTE
pt presents from home with increased lethargy as per family. pt has romero that has been draining darker than usual. pt with no complaints.

## 2022-04-30 NOTE — ED PROVIDER NOTE - CONSTITUTIONAL, MLM
normal... chronically ill appearing, lying with eyes open, awake, alert, oriented to person, place, time/situation and in no apparent distress.

## 2022-05-01 LAB
ANION GAP SERPL CALC-SCNC: 7 MMOL/L — SIGNIFICANT CHANGE UP (ref 5–17)
BUN SERPL-MCNC: 52 MG/DL — HIGH (ref 7–23)
CALCIUM SERPL-MCNC: 8.8 MG/DL — SIGNIFICANT CHANGE UP (ref 8.5–10.1)
CHLORIDE SERPL-SCNC: 109 MMOL/L — HIGH (ref 96–108)
CO2 SERPL-SCNC: 21 MMOL/L — LOW (ref 22–31)
CREAT SERPL-MCNC: 3.27 MG/DL — HIGH (ref 0.5–1.3)
EGFR: 18 ML/MIN/1.73M2 — LOW
GLUCOSE SERPL-MCNC: 126 MG/DL — HIGH (ref 70–99)
HCT VFR BLD CALC: 29.3 % — LOW (ref 39–50)
HGB BLD-MCNC: 8.8 G/DL — LOW (ref 13–17)
MCHC RBC-ENTMCNC: 28.4 PG — SIGNIFICANT CHANGE UP (ref 27–34)
MCHC RBC-ENTMCNC: 30 GM/DL — LOW (ref 32–36)
MCV RBC AUTO: 94.5 FL — SIGNIFICANT CHANGE UP (ref 80–100)
PLATELET # BLD AUTO: 134 K/UL — LOW (ref 150–400)
POTASSIUM SERPL-MCNC: 4.3 MMOL/L — SIGNIFICANT CHANGE UP (ref 3.5–5.3)
POTASSIUM SERPL-SCNC: 4.3 MMOL/L — SIGNIFICANT CHANGE UP (ref 3.5–5.3)
PROCALCITONIN SERPL-MCNC: 0.37 NG/ML — HIGH (ref 0.02–0.1)
RBC # BLD: 3.1 M/UL — LOW (ref 4.2–5.8)
RBC # FLD: 14.4 % — SIGNIFICANT CHANGE UP (ref 10.3–14.5)
SODIUM SERPL-SCNC: 137 MMOL/L — SIGNIFICANT CHANGE UP (ref 135–145)
TSH SERPL-MCNC: 1.46 UU/ML — SIGNIFICANT CHANGE UP (ref 0.34–4.82)
WBC # BLD: 10.62 K/UL — HIGH (ref 3.8–10.5)
WBC # FLD AUTO: 10.62 K/UL — HIGH (ref 3.8–10.5)

## 2022-05-01 PROCEDURE — 70450 CT HEAD/BRAIN W/O DYE: CPT | Mod: 26

## 2022-05-01 PROCEDURE — 99233 SBSQ HOSP IP/OBS HIGH 50: CPT

## 2022-05-01 RX ADMIN — Medication 650 MILLIGRAM(S): at 21:03

## 2022-05-01 RX ADMIN — Medication 75 MILLIGRAM(S): at 09:22

## 2022-05-01 RX ADMIN — TAMSULOSIN HYDROCHLORIDE 0.4 MILLIGRAM(S): 0.4 CAPSULE ORAL at 20:30

## 2022-05-01 RX ADMIN — HEPARIN SODIUM 5000 UNIT(S): 5000 INJECTION INTRAVENOUS; SUBCUTANEOUS at 06:01

## 2022-05-01 RX ADMIN — Medication 650 MILLIGRAM(S): at 20:33

## 2022-05-01 RX ADMIN — CEFTRIAXONE 100 MILLIGRAM(S): 500 INJECTION, POWDER, FOR SOLUTION INTRAMUSCULAR; INTRAVENOUS at 14:54

## 2022-05-01 RX ADMIN — Medication 75 MILLIGRAM(S): at 20:30

## 2022-05-01 RX ADMIN — Medication 3 MILLIGRAM(S): at 20:30

## 2022-05-01 RX ADMIN — SODIUM CHLORIDE 50 MILLILITER(S): 9 INJECTION INTRAMUSCULAR; INTRAVENOUS; SUBCUTANEOUS at 20:00

## 2022-05-01 RX ADMIN — HEPARIN SODIUM 5000 UNIT(S): 5000 INJECTION INTRAVENOUS; SUBCUTANEOUS at 14:58

## 2022-05-01 NOTE — DIETITIAN INITIAL EVALUATION ADULT - OTHER INFO
87 M hx HTN on Metoprolol, BPH, early dementia anxiety, s/p catheter brought in by wife as pt's wife states that pt has been weak for the last 2 days and she has been unable to get him up.  per wife also states that pt has had more trouble talking, but states that this is acute on chronic. no reported fever, vomiting, diarrhea. wife also reports pt has suprapubic catheter. Wife at bedside, states change out was 3 weeks ago. Since then urine dark with sediment and strong smelling, UA pending r/o UTI.

## 2022-05-01 NOTE — PROVIDER CONTACT NOTE (OTHER) - SITUATION
Consult called in Spoke with Sandi from answering service
Consult called in by Jada FERRELL Spoke with Donnell from answering service
Consult called in Spoke with Donnell from answering service

## 2022-05-01 NOTE — DIETITIAN INITIAL EVALUATION ADULT - ADD RECOMMEND
Consider downgrade diet for ease of mastication; Add Ensure Enlive TID; Rx MVI daily; Encourage HBV protein sources; Provide encouragement/assistance as needed during mealtimes to inc PO

## 2022-05-01 NOTE — DIETITIAN NUTRITION RISK NOTIFICATION - ADDITIONAL COMMENTS/DIETITIAN RECOMMENDATIONS
1) Consider downgrade diet for ease of mastication  2) Add Ensure Enlive TID  3) Rx MVI daily  4) Encourage HBV protein sources  5) Provide encouragement/assistance as needed during mealtimes to inc PO

## 2022-05-01 NOTE — DIETITIAN INITIAL EVALUATION ADULT - PERTINENT LABORATORY DATA
05-01    137  |  109<H>  |  52<H>  ----------------------------<  126<H>  4.3   |  21<L>  |  3.27<H>    Ca    8.8      01 May 2022 07:57    TPro  7.5  /  Alb  2.7<L>  /  TBili  1.0  /  DBili  x   /  AST  14<L>  /  ALT  12  /  AlkPhos  86  04-30

## 2022-05-01 NOTE — PROGRESS NOTE ADULT - SUBJECTIVE AND OBJECTIVE BOX
CC: Bradycardia    HPI: 87 M hx HTN on Metoprolol, BPH, early dementia anxiety, SP catheter brought in by wife  as Pt has been weak for the last 2 days and she has been unable to get him up.  PTs wife also states that pt has had more trouble talking, but states that this is acute on chronic, no reported fever, vomiting, diarrhea.  Wife at bedside, states cath was  changed  about  3 weeks ago. Since then urine dark with sediment and strong smelling.   ED course: CXR wet read-> clear. UA pending however has SP catheter. Lactate normal .WBC 19. Scr 3.9. s/p ceftriaxone. LFTs nl. COVID negative. No e/o hypoxia and no e/o hypotension.       INTERVAL HPI/ OVERNIGHT EVENTS: chart reviewed, Pt was seen and examined, awake, able to tell me his name and that he is in the hospital but doesn't know why, reports doesn't feel well, can not further collaborate     Vital Signs Last 24 Hrs  T(C): 36.5 (01 May 2022 16:30), Max: 36.7 (2022 23:12)  T(F): 97.7 (01 May 2022 16:30), Max: 98.1 (2022 23:12)  HR: 55 (01 May 2022 16:30) (52 - 58)  BP: 125/60 (01 May 2022 16:30) (116/66 - 153/65)  RR: 17 (01 May 2022 16:30) (17 - 17)  SpO2: 96% (01 May 2022 16:30) (94% - 96%)      REVIEW OF SYSTEMS:  unable to obtain due to confusion      PHYSICAL EXAM:  General: Well developed; malnourished, frail elderly male,  in no acute distress  Eyes: PERRL,  conjunctiva and sclera clear  Head: Normocephalic; atraumatic  ENMT: No nasal discharge; airway clear  Neck: Supple; non tender; no masses  Respiratory:  Decreased BS, No wheezes, rales or rhonchi  Cardiovascular: Regular rate and rhythm. S1 and S2 Normal   Gastrointestinal: Soft non-tender non-distended; Normal bowel sounds  Genitourinary: No  suprapubic  tenderness, suprapubic  cath in place   Extremities: No pitting  edema  Vascular: Peripheral pulses palpable 2+ bilaterally  Neurological: Alert and oriented x1-2, confused,  hypophonic voice, mild tremors   Skin: Warm and dry. No acute rash  Lymph Nodes: No acute cervical adenopathy  Musculoskeletal: No joint edema or erythema       LABS:                         8.8    10.62 )-----------( 134      ( 01 May 2022 07:57 )             29.3     01 May 2022 07:57    137    |  109    |  52     ----------------------------<  126    4.3     |  21     |  3.27     Ca    8.8        01 May 2022 07:57    TPro  7.5    /  Alb  2.7    /  TBili  1.0    /  DBili  x      /  AST  14     /  ALT  12     /  AlkPhos  86     2022 10:51      CAPILLARY BLOOD GLUCOSE        LIVER FUNCTIONS - ( 2022 10:51 )  Alb: 2.7 g/dL / Pro: 7.5 gm/dL / ALK PHOS: 86 U/L / ALT: 12 U/L / AST: 14 U/L / GGT: x           Urinalysis Basic - ( 2022 17:30 )    Color: Yellow / Appearance: very cloudy / S.010 / pH: x  Gluc: x / Ketone: Negative  / Bili: Negative / Urobili: Negative   Blood: x / Protein: 30 mg/dL / Nitrite: Positive   Leuk Esterase: Moderate / RBC: 3-5 /HPF / WBC 26-50   Sq Epi: x / Non Sq Epi: Negative / Bacteria: TNTC        MEDICATIONS  (STANDING):  cefTRIAXone   IVPB 1000 milliGRAM(s) IV Intermittent every 24 hours  heparin   Injectable 5000 Unit(s) SubCutaneous every 8 hours  metoprolol succinate ER 75 milliGRAM(s) Oral daily  pregabalin 75 milliGRAM(s) Oral two times a day  sodium chloride 0.9%. 1000 milliLiter(s) (50 mL/Hr) IV Continuous <Continuous>  tamsulosin 0.4 milliGRAM(s) Oral at bedtime    MEDICATIONS  (PRN):  acetaminophen     Tablet .. 650 milliGRAM(s) Oral every 6 hours PRN Temp greater or equal to 38C (100.4F), Mild Pain (1 - 3)  aluminum hydroxide/magnesium hydroxide/simethicone Suspension 30 milliLiter(s) Oral every 4 hours PRN Dyspepsia  melatonin 3 milliGRAM(s) Oral at bedtime PRN Insomnia  ondansetron Injectable 4 milliGRAM(s) IV Push every 8 hours PRN Nausea and/or Vomiting      RADIOLOGY & ADDITIONAL TESTS:      ACC: [id number] EXAM:  US KIDNEYS AND BLADDER                        PROCEDURE DATE:  2022      FINDINGS:  Right kidney: 7.9 cm. No hydronephrosis. Renal cortical atrophy.    Left kidney: 8.6 cm. No hydronephrosis. 2.7 cm cyst in the interpolar   region.    Urinary bladder: Not visualized.    IMPRESSION:  Atrophic kidneys. No hydronephrosis.  Small left renal cyst.  Nonvisualization of the urinary bladder.          ACC: [id number] EXAM:  XR CHEST PORTABLE URGENT 1V                          PROCEDURE DATE:  2022      INTERPRETATION:  Portable chest radiograph    CLINICAL INFORMATION: Lethargy    TECHNIQUE:  Portable  AP chest radiograph.    COMPARISON:2019 chest radiograph .    FINDINGS:  CATHETERS AND TUBES: None    PULMONARY: Focal LEFT lung base peripheral lung airspace   opacity/infiltrate. LEFT upper zones and remaining RIGHT lung parenchyma   clear..   No pneumothorax.    HEART/VASCULAR:The heart and mediastinum size and configuration are   within normal limits.      BONES: Visualized osseous structures are intact.    IMPRESSION:   No radiographic evidence of active chest disease.

## 2022-05-01 NOTE — PROGRESS NOTE ADULT - ASSESSMENT
87 M hx HTN on Metoprolol, BPH, early dementia anxiety, SP catheter admitted for:     #Metabolic encephalopathy/ Lethargy possibly 2/2 infection vs bradycardia?  check CT chest   Fall precautions  supportive care  Will ask neuro for eval as Pt also has some tremors       # Abnormal UA, meets  Sepsis  criteria. Suspected  Complicated UTI related to Suprapubic catheter   H/o Urinary obstruction   Cath was not changed in ED, last changed about 3 weeks ago  Urology eval Dr Georges, as per wife needs special order catheter, will bring from home  UA abnormal but also could be colonized  No UCX sent   F/u Blood CX  CXR neg   C/w Ceftriaxone for now   ID eval      #Bradycardia/Sinus pauses  C/w tele: 1st degree AV block, HR lowest in 30s,   Check ECG  ECHO ordered  CArdio eval       #ZELDA on CKD III  - baseline CR about 3.2 , start trending down   - C/w gentle IVF   - Losartan on hold   - Monitor UO  - Kidney sono with atrophic kidneys, no hydro   - labs in am       HTN:   - was hypotensive on admission, resolved with IVF bolus  -continue to hold  losartan for now  - on decreased dose of  toprol Xl with parameters  - monitor     DVT proph  -heparin

## 2022-05-01 NOTE — DIETITIAN INITIAL EVALUATION ADULT - PERTINENT MEDS FT
MEDICATIONS  (STANDING):  cefTRIAXone   IVPB 1000 milliGRAM(s) IV Intermittent every 24 hours  heparin   Injectable 5000 Unit(s) SubCutaneous every 8 hours  metoprolol succinate ER 75 milliGRAM(s) Oral daily  pregabalin 75 milliGRAM(s) Oral two times a day  sodium chloride 0.9%. 1000 milliLiter(s) (50 mL/Hr) IV Continuous <Continuous>  tamsulosin 0.4 milliGRAM(s) Oral at bedtime    MEDICATIONS  (PRN):  acetaminophen     Tablet .. 650 milliGRAM(s) Oral every 6 hours PRN Temp greater or equal to 38C (100.4F), Mild Pain (1 - 3)  aluminum hydroxide/magnesium hydroxide/simethicone Suspension 30 milliLiter(s) Oral every 4 hours PRN Dyspepsia  melatonin 3 milliGRAM(s) Oral at bedtime PRN Insomnia  ondansetron Injectable 4 milliGRAM(s) IV Push every 8 hours PRN Nausea and/or Vomiting

## 2022-05-02 LAB
ANION GAP SERPL CALC-SCNC: 3 MMOL/L — LOW (ref 5–17)
BUN SERPL-MCNC: 36 MG/DL — HIGH (ref 7–23)
CALCIUM SERPL-MCNC: 8.9 MG/DL — SIGNIFICANT CHANGE UP (ref 8.5–10.1)
CHLORIDE SERPL-SCNC: 112 MMOL/L — HIGH (ref 96–108)
CO2 SERPL-SCNC: 25 MMOL/L — SIGNIFICANT CHANGE UP (ref 22–31)
CREAT SERPL-MCNC: 2.64 MG/DL — HIGH (ref 0.5–1.3)
EGFR: 23 ML/MIN/1.73M2 — LOW
FOLATE SERPL-MCNC: 2.1 NG/ML — LOW
GLUCOSE SERPL-MCNC: 105 MG/DL — HIGH (ref 70–99)
HCT VFR BLD CALC: 29.2 % — LOW (ref 39–50)
HGB BLD-MCNC: 9.1 G/DL — LOW (ref 13–17)
MCHC RBC-ENTMCNC: 28.5 PG — SIGNIFICANT CHANGE UP (ref 27–34)
MCHC RBC-ENTMCNC: 31.2 GM/DL — LOW (ref 32–36)
MCV RBC AUTO: 91.5 FL — SIGNIFICANT CHANGE UP (ref 80–100)
PLATELET # BLD AUTO: 156 K/UL — SIGNIFICANT CHANGE UP (ref 150–400)
POTASSIUM SERPL-MCNC: 4.4 MMOL/L — SIGNIFICANT CHANGE UP (ref 3.5–5.3)
POTASSIUM SERPL-SCNC: 4.4 MMOL/L — SIGNIFICANT CHANGE UP (ref 3.5–5.3)
RBC # BLD: 3.19 M/UL — LOW (ref 4.2–5.8)
RBC # FLD: 14.3 % — SIGNIFICANT CHANGE UP (ref 10.3–14.5)
SODIUM SERPL-SCNC: 140 MMOL/L — SIGNIFICANT CHANGE UP (ref 135–145)
VIT B12 SERPL-MCNC: 449 PG/ML — SIGNIFICANT CHANGE UP (ref 232–1245)
WBC # BLD: 6.52 K/UL — SIGNIFICANT CHANGE UP (ref 3.8–10.5)
WBC # FLD AUTO: 6.52 K/UL — SIGNIFICANT CHANGE UP (ref 3.8–10.5)

## 2022-05-02 PROCEDURE — 93306 TTE W/DOPPLER COMPLETE: CPT | Mod: 26

## 2022-05-02 PROCEDURE — 99233 SBSQ HOSP IP/OBS HIGH 50: CPT

## 2022-05-02 PROCEDURE — 93010 ELECTROCARDIOGRAM REPORT: CPT

## 2022-05-02 PROCEDURE — 99223 1ST HOSP IP/OBS HIGH 75: CPT

## 2022-05-02 RX ORDER — CARBIDOPA AND LEVODOPA 25; 100 MG/1; MG/1
1 TABLET ORAL DAILY
Refills: 0 | Status: DISCONTINUED | OUTPATIENT
Start: 2022-05-02 | End: 2022-05-04

## 2022-05-02 RX ORDER — FOLIC ACID 0.8 MG
1 TABLET ORAL DAILY
Refills: 0 | Status: DISCONTINUED | OUTPATIENT
Start: 2022-05-02 | End: 2022-05-04

## 2022-05-02 RX ORDER — NICOTINE POLACRILEX 2 MG
1 GUM BUCCAL DAILY
Refills: 0 | Status: DISCONTINUED | OUTPATIENT
Start: 2022-05-02 | End: 2022-05-04

## 2022-05-02 RX ADMIN — HEPARIN SODIUM 5000 UNIT(S): 5000 INJECTION INTRAVENOUS; SUBCUTANEOUS at 05:20

## 2022-05-02 RX ADMIN — TAMSULOSIN HYDROCHLORIDE 0.4 MILLIGRAM(S): 0.4 CAPSULE ORAL at 22:10

## 2022-05-02 RX ADMIN — HEPARIN SODIUM 5000 UNIT(S): 5000 INJECTION INTRAVENOUS; SUBCUTANEOUS at 14:59

## 2022-05-02 RX ADMIN — CARBIDOPA AND LEVODOPA 1 TABLET(S): 25; 100 TABLET ORAL at 11:33

## 2022-05-02 RX ADMIN — Medication 75 MILLIGRAM(S): at 11:32

## 2022-05-02 RX ADMIN — HEPARIN SODIUM 5000 UNIT(S): 5000 INJECTION INTRAVENOUS; SUBCUTANEOUS at 22:10

## 2022-05-02 RX ADMIN — CEFTRIAXONE 100 MILLIGRAM(S): 500 INJECTION, POWDER, FOR SOLUTION INTRAMUSCULAR; INTRAVENOUS at 14:59

## 2022-05-02 RX ADMIN — Medication 75 MILLIGRAM(S): at 11:33

## 2022-05-02 NOTE — PHYSICAL THERAPY INITIAL EVALUATION ADULT - BALANCE DISTURBANCE, IDENTIFIED IMPAIRMENT CONTRIBUTE, REHAB EVAL
Quality 137: Melanoma: Continuity Of Care - Recall System: Patient information entered into a recall system that includes: target date for the next exam specified AND a process to follow up with patients regarding missed or unscheduled appointments
Detail Level: Detailed
movement disorder/abnormal muscle tone/impaired postural control/decreased strength

## 2022-05-02 NOTE — PHYSICAL THERAPY INITIAL EVALUATION ADULT - LEVEL OF INDEPENDENCE: SIT/STAND, REHAB EVAL
standing posture mod.stooped,mildly flexed at hips/knees T/L spine/minimum assist (75% patients effort)/moderate assist (50% patients effort)

## 2022-05-02 NOTE — CONSULT NOTE ADULT - SUBJECTIVE AND OBJECTIVE BOX
Patient is a 87y old  Male who presents with a chief complaint of weakness     HPI:  88 y/o Male with h/o HTN on Metoprolol, BPH, early dementia, anxiety disorder, urinary retention s/p suprapubic catheter was admitted on  for increased weakness. Pt's wife states that pt has been weak for the last 2 days PTA and she has been unable to get him up. wife also stated that pt has had more trouble talking, but states that this is acute on chronic. no reported fever at home. The urine in bag is reported dark with sediment and strong smelling. In ER he received ceftriaxone.    No urine culture is available.     PAST MEDICAL HISTORY:  Anxiety   BPH (benign prostatic hyperplasia)   Erosion of urethra due to catheterization of urinary tract, initial encounter   HTN (hypertension)   Leg pain, left "ankle".  Memory deficit   Pedal edema b/l  Urinary retention.     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     Meds: per reconciliation sheet, noted below  MEDICATIONS  (STANDING):  carbidopa/levodopa  25/100 1 Tablet(s) Oral daily  cefTRIAXone   IVPB 1000 milliGRAM(s) IV Intermittent every 24 hours  heparin   Injectable 5000 Unit(s) SubCutaneous every 8 hours  metoprolol succinate ER 75 milliGRAM(s) Oral daily  nicotine - 21 mG/24Hr(s) Patch 1 patch Transdermal daily  pregabalin 75 milliGRAM(s) Oral two times a day  sodium chloride 0.9%. 1000 milliLiter(s) (50 mL/Hr) IV Continuous <Continuous>  tamsulosin 0.4 milliGRAM(s) Oral at bedtime    MEDICATIONS  (PRN):  acetaminophen     Tablet .. 650 milliGRAM(s) Oral every 6 hours PRN Temp greater or equal to 38C (100.4F), Mild Pain (1 - 3)  aluminum hydroxide/magnesium hydroxide/simethicone Suspension 30 milliLiter(s) Oral every 4 hours PRN Dyspepsia  melatonin 3 milliGRAM(s) Oral at bedtime PRN Insomnia  ondansetron Injectable 4 milliGRAM(s) IV Push every 8 hours PRN Nausea and/or Vomiting    Allergies    No Known Allergies    Intolerances      Social: prior smoking, no alcohol, no illegal drugs; no recent travel, no exposure to TB  FAMILY HISTORY:  No pertinent family history in first degree relatives      no history of premature cardiovascular disease in first degree relatives    ROS: the patient limited due to confusion; has increased weakness  All other systems reviewed and are negative    Vital Signs Last 24 Hrs  T(C): 36.4 (02 May 2022 09:56), Max: 36.7 (01 May 2022 21:25)  T(F): 97.5 (02 May 2022 09:56), Max: 98.1 (01 May 2022 21:25)  HR: 68 (02 May 2022 09:56) (55 - 68)  BP: 168/72 (02 May 2022 09:56) (125/60 - 168/72)  BP(mean): --  RR: 16 (02 May 2022 09:56) (16 - 17)  SpO2: 97% (02 May 2022 09:56) (91% - 97%)  Daily     Daily     PE:    Constitutional:  No acute distress  HEENT: NC/AT, EOMI, PERRLA, conjunctivae clear; ears and nose atraumatic; pharynx benign  Neck: supple; thyroid not palpable  Back: no tenderness  Respiratory: respiratory effort normal; clear to auscultation  Cardiovascular: S1S2 regular, no murmurs  Abdomen: soft, not tender, not distended, positive BS; no liver or spleen organomegaly  Genitourinary: no suprapubic tenderness  suprapubic catheter  Lymphatic: no LN palpable  Musculoskeletal: no muscle tenderness, no joint swelling or tenderness  Extremities: no pedal edema  Neurological/ Psychiatric: AxOx3, judgement and insight impaired; moving all extremities  Skin: no rashes; no palpable lesions    Labs: all available labs reviewed                        9.1    6.52  )-----------( 156      ( 02 May 2022 08:14 )             29.2     05-02    140  |  112<H>  |  36<H>  ----------------------------<  105<H>  4.4   |  25  |  2.64<H>    Ca    8.9      02 May 2022 08:14    Urinalysis Basic - ( 2022 17:30 )    Color: Yellow / Appearance: very cloudy / S.010 / pH: x  Gluc: x / Ketone: Negative  / Bili: Negative / Urobili: Negative   Blood: x / Protein: 30 mg/dL / Nitrite: Positive   Leuk Esterase: Moderate / RBC: 3-5 /HPF / WBC 26-50   Sq Epi: x / Non Sq Epi: Negative / Bacteria: TNTC    COVID-19 PCR: NotDetec (22 @ 10:00)    Radiology: all available radiological tests reviewed    < from: US Kidney and Bladder (22 @ 17:52) >  Atrophic kidneys. No hydronephrosis.  Small left renal cyst.  Nonvisualization of the urinary bladder.  < end of copied text >      Advanced directives addressed: full resuscitation

## 2022-05-02 NOTE — PHYSICAL THERAPY INITIAL EVALUATION ADULT - PRECAUTIONS/LIMITATIONS, REHAB EVAL
fall precautions wife reports fall ~1 month ago with cut to distal R leg anteriorly but otherwise no injuries and did not seek medical attention/fall precautions

## 2022-05-02 NOTE — PHYSICAL THERAPY INITIAL EVALUATION ADULT - MANUAL MUSCLE TESTING RESULTS, REHAB EVAL
BUE/BLEs grossly >3+ to 4-/5 bilaterally thruout except L shoulder FLEXORS 3 to 3-/5 in available ROM

## 2022-05-02 NOTE — PHYSICAL THERAPY INITIAL EVALUATION ADULT - MODALITIES TREATMENT COMMENTS
upon returning to bedside pt reported need to defecate urgently ; was transferred to commode & escorted to toilet , +BM ,assist for effective post-BM hygiene care ,returned to bedside chair ,+alarm

## 2022-05-02 NOTE — PHYSICAL THERAPY INITIAL EVALUATION ADULT - GENERAL OBSERVATIONS, REHAB EVAL
sitting up in bed wearing eyeglasses, bandaid to L cheek infra-orbital region,pt is listing mod-severely to L side ,IV L forearm ,dozing off readily in bed but easily aroused,has some difficulty following directions and responses are frequently unrelated to conversation at hand ,out of context ,moderate forward head alignment ,stooped upper back posture

## 2022-05-02 NOTE — PHYSICAL THERAPY INITIAL EVALUATION ADULT - LIVES WITH, PROFILE
spouse wife reports they just sold their home in [place] 4 days ago (had open house SAT/SUN and by Monday had 5 offers ,took all cash offer overasking price ) Home has stairs everywhere per wife ; they will be moving to a single level home in [place] currently owned by her son Michael/spouse

## 2022-05-02 NOTE — CONSULT NOTE ADULT - SUBJECTIVE AND OBJECTIVE BOX
Patient is a 87y old  Male who presents with a chief complaint of weakness (02 May 2022 15:27)    ________________________________  HU MCINTOSH is a 87y year old Male with a past medical history of early onset dementia, hypertension, BPH, history of suprapubic catheter, who presented to the ER for weakness.  Patient unable to provide history due to dementia.  Per wife also states that pt has had more trouble talking, but states that this is acute on chronic. no reported fever, vomiting, diarrhea. wife also reports pt has suprapubic catheter. no hx of heart attack, stroke. no hx of parkinson's. smoker. Wife at bedside, state change out was 3 weeks ago. Since then urine dark with sediment and strong smelling.     On telemetry, had sinus rhythm blocked PAC and bradycardia.  Cardiology consulted.  Patient remains confused.  Unable to provide history.    ________________________________  Review of systems: A 10 point review of system has been performed, and is negative except for what has been mentioned in the above history of present illness.     PAST MEDICAL & SURGICAL HISTORY:  HTN (hypertension)    Anxiety    Urinary retention    Erosion of urethra due to catheterization of urinary tract, initial encounter    Memory deficit    Romero catheter in place    BPH (benign prostatic hyperplasia)    Pedal edema  b/l    Poor historian    Cigarette smoker    Leg pain, left  &quot;ankle&quot;.    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    SOCIAL HISTORY: The patient denies any history of tobacco abuse, alcohol abuse or illicit drug use.    ALLERGIES:  No Known Allergies    Home Medications:  Lisa COVID-19 Vaccine PF intramuscular suspension: 0.5 milliliter(s) intramuscular once (30 Apr 2022 15:56)  losartan 100 mg oral tablet: 1 tab(s) orally once a day  ***per wife*** (30 Apr 2022 15:56)  metoprolol succinate 100 mg oral tablet, extended release: 1 tab(s) orally once a day (30 Apr 2022 15:56)  pregabalin 75 mg oral capsule: 1 cap(s) orally 2 times a day (30 Apr 2022 15:56)  tamsulosin 0.4 mg oral capsule: 1 cap(s) orally once a day (at bedtime) (30 Apr 2022 15:56)    MEDICATIONS  (STANDING):  carbidopa/levodopa  25/100 1 Tablet(s) Oral daily  cefTRIAXone   IVPB 1000 milliGRAM(s) IV Intermittent every 24 hours  folic acid 1 milliGRAM(s) Oral daily  heparin   Injectable 5000 Unit(s) SubCutaneous every 8 hours  metoprolol succinate ER 75 milliGRAM(s) Oral daily  nicotine - 21 mG/24Hr(s) Patch 1 patch Transdermal daily  pregabalin 75 milliGRAM(s) Oral two times a day  sodium chloride 0.9%. 1000 milliLiter(s) (50 mL/Hr) IV Continuous <Continuous>  tamsulosin 0.4 milliGRAM(s) Oral at bedtime    MEDICATIONS  (PRN):  acetaminophen     Tablet .. 650 milliGRAM(s) Oral every 6 hours PRN Temp greater or equal to 38C (100.4F), Mild Pain (1 - 3)  aluminum hydroxide/magnesium hydroxide/simethicone Suspension 30 milliLiter(s) Oral every 4 hours PRN Dyspepsia  melatonin 3 milliGRAM(s) Oral at bedtime PRN Insomnia  ondansetron Injectable 4 milliGRAM(s) IV Push every 8 hours PRN Nausea and/or Vomiting    Vital Signs Last 24 Hrs  T(C): 36.4 (02 May 2022 09:56), Max: 36.7 (01 May 2022 21:25)  T(F): 97.5 (02 May 2022 09:56), Max: 98.1 (01 May 2022 21:25)  HR: 68 (02 May 2022 09:56) (56 - 68)  BP: 168/72 (02 May 2022 09:56) (155/76 - 168/72)  BP(mean): --  RR: 16 (02 May 2022 09:56) (16 - 17)  SpO2: 97% (02 May 2022 09:56) (91% - 97%)  I&O's Summary    01 May 2022 07:01  -  02 May 2022 07:00  --------------------------------------------------------  IN: 0 mL / OUT: 1280 mL / NET: -1280 mL    02 May 2022 07:01  -  02 May 2022 17:40  --------------------------------------------------------  IN: 100 mL / OUT: 0 mL / NET: 100 mL      ________________________________  GENERAL APPEARANCE:  No acute distress  HEAD: normocephalic, atraumatic  NECK: supple, no jugular venous distention, no carotid bruit    HEART: Regular rate and rhythm, S1, S2 normal, 1/6 murmur    CHEST:  No anterior chest wall tenderness    LUNGS:  Clear to auscultation, without any wheezing, rhonchi or rales    ABDOMEN: soft, nontender, nondistended, with positive bowel sounds appreciated  EXTREMITIES: no edema.   NEURO: Awake but confused  PSYC:  Normal affect  SKIN:  Dry  ________________________________   TELEMETRY: Sinus rhythm with blocked PAC.  No significant bradycardia or high degree AV block.    ECG: Sinus rhythm with first-degree AV block and PACs    LABS:                        9.1    6.52  )-----------( 156      ( 02 May 2022 08:14 )             29.2             05-02    140  |  112<H>  |  36<H>  ----------------------------<  105<H>  4.4   |  25  |  2.64<H>    Ca    8.9      02 May 2022 08:14        ________________________________    RADIOLOGY & ADDITIONAL STUDIES:  IMPRESSION:  No CT evidence of acute intracranial pathology.  Follow-up MRI may be   obtained for more sensitive evaluation.    There is a 1.8 cm superficial skin lesion in the high left parietal   scalp.  Correlate with direct visualization to exclude skin cancer.    ________________________________    ASSESSMENT:  Bradycardia, blocked PACs  Hypertension  Advanced dementia  Urinary tract infection  Acute on chronic renal insufficiency-improving  Encephalopathy      PLAN:  Telemetry reviewed.  No telemetry from ER reviewed, showing sinus rhythm with blocked PACs.    Monitor on telemetry for another 24 hours.  TSH from feud-normal.  Unable to elicit any history of dizziness and lightheadedness.  No sign of heart failure.  No need for echo.      __________________________________________________________________________  Thank you for allowing me to participate in the care of your patient. Please contact me should any questions arise.    NICHOLE Garcia DO, [facility]  Office: [phone]

## 2022-05-02 NOTE — CONSULT NOTE ADULT - NS ATTEND AMEND GEN_ALL_CORE FT
Agree with above.  Some parkinsonian features making it worth a try of carbidopa/levodopa to see if this helps with mobility.  Can start with carbidopa/levodopa 25/100 1 tab daily x 2-3 days and then 1 tab twice a day x 2-3 days and then 1 tab three times per day    Treatment of UTI.    Will f/u as needed.

## 2022-05-02 NOTE — PROGRESS NOTE ADULT - SUBJECTIVE AND OBJECTIVE BOX
CC: Bradycardia    HPI: 87 M hx HTN on Metoprolol, BPH, early dementia anxiety, SP catheter brought in by wife  as Pt has been weak for the last 2 days and she has been unable to get him up.  PTs wife also states that pt has had more trouble talking, but states that this is acute on chronic, no reported fever, vomiting, diarrhea.  Wife at bedside, states cath was  changed  about  3 weeks ago. Since then urine dark with sediment and strong smelling.   ED course: CXR wet read-> clear. UA pending however has SP catheter. Lactate normal .WBC 19. Scr 3.9. s/p ceftriaxone. LFTs nl. COVID negative. No e/o hypoxia and no e/o hypotension.       Vital Signs Last 24 Hrs  T(C): 36.4 (02 May 2022 09:56), Max: 36.7 (01 May 2022 21:25)  T(F): 97.5 (02 May 2022 09:56), Max: 98.1 (01 May 2022 21:25)  HR: 68 (02 May 2022 09:56) (55 - 68)  BP: 168/72 (02 May 2022 09:56) (125/60 - 168/72)  BP(mean): --  RR: 16 (02 May 2022 09:56) (16 - 17)  SpO2: 97% (02 May 2022 09:56) (91% - 97%)    REVIEW OF SYSTEMS:  unable to obtain due to confusion      PHYSICAL EXAM:  General: Well developed; malnourished, frail elderly male,  in no acute distress  Eyes: PERRL,  conjunctiva and sclera clear  Head: Normocephalic; atraumatic  ENMT: No nasal discharge; airway clear  Neck: Supple; non tender; no masses  Respiratory:  Decreased BS, No wheezes, rales or rhonchi  Cardiovascular: Regular rate and rhythm. S1 and S2 Normal   Gastrointestinal: Soft non-tender non-distended; Normal bowel sounds  Genitourinary: No  suprapubic  tenderness, suprapubic  cath in place   Extremities: No pitting  edema  Vascular: Peripheral pulses palpable 2+ bilaterally  Neurological: Alert and oriented x1-2, confused,  hypophonic voice, mild tremors   Skin: Warm and dry. No acute rash  Lymph Nodes: No acute cervical adenopathy  Musculoskeletal: No joint edema or erythema       LABS:                         8.8    10.62 )-----------( 134      ( 01 May 2022 07:57 )             29.3     01 May 2022 07:57    137    |  109    |  52     ----------------------------<  126    4.3     |  21     |  3.27     Ca    8.8        01 May 2022 07:57    TPro  7.5    /  Alb  2.7    /  TBili  1.0    /  DBili  x      /  AST  14     /  ALT  12     /  AlkPhos  86     2022 10:51      CAPILLARY BLOOD GLUCOSE        LIVER FUNCTIONS - ( 2022 10:51 )  Alb: 2.7 g/dL / Pro: 7.5 gm/dL / ALK PHOS: 86 U/L / ALT: 12 U/L / AST: 14 U/L / GGT: x           Urinalysis Basic - ( 2022 17:30 )    Color: Yellow / Appearance: very cloudy / S.010 / pH: x  Gluc: x / Ketone: Negative  / Bili: Negative / Urobili: Negative   Blood: x / Protein: 30 mg/dL / Nitrite: Positive   Leuk Esterase: Moderate / RBC: 3-5 /HPF / WBC 26-50   Sq Epi: x / Non Sq Epi: Negative / Bacteria: TNTC        MEDICATIONS  (STANDING):  cefTRIAXone   IVPB 1000 milliGRAM(s) IV Intermittent every 24 hours  heparin   Injectable 5000 Unit(s) SubCutaneous every 8 hours  metoprolol succinate ER 75 milliGRAM(s) Oral daily  pregabalin 75 milliGRAM(s) Oral two times a day  sodium chloride 0.9%. 1000 milliLiter(s) (50 mL/Hr) IV Continuous <Continuous>  tamsulosin 0.4 milliGRAM(s) Oral at bedtime    MEDICATIONS  (PRN):  acetaminophen     Tablet .. 650 milliGRAM(s) Oral every 6 hours PRN Temp greater or equal to 38C (100.4F), Mild Pain (1 - 3)  aluminum hydroxide/magnesium hydroxide/simethicone Suspension 30 milliLiter(s) Oral every 4 hours PRN Dyspepsia  melatonin 3 milliGRAM(s) Oral at bedtime PRN Insomnia  ondansetron Injectable 4 milliGRAM(s) IV Push every 8 hours PRN Nausea and/or Vomiting      RADIOLOGY & ADDITIONAL TESTS:      ACC: [id number] EXAM:  US KIDNEYS AND BLADDER                        PROCEDURE DATE:  2022      FINDINGS:  Right kidney: 7.9 cm. No hydronephrosis. Renal cortical atrophy.    Left kidney: 8.6 cm. No hydronephrosis. 2.7 cm cyst in the interpolar   region.    Urinary bladder: Not visualized.    IMPRESSION:  Atrophic kidneys. No hydronephrosis.  Small left renal cyst.  Nonvisualization of the urinary bladder.          ACC: [id number] EXAM:  XR CHEST PORTABLE URGENT 1V                          PROCEDURE DATE:  2022      INTERPRETATION:  Portable chest radiograph    CLINICAL INFORMATION: Lethargy    TECHNIQUE:  Portable  AP chest radiograph.    COMPARISON:2019 chest radiograph .    FINDINGS:  CATHETERS AND TUBES: None    PULMONARY: Focal LEFT lung base peripheral lung airspace   opacity/infiltrate. LEFT upper zones and remaining RIGHT lung parenchyma   clear..   No pneumothorax.    HEART/VASCULAR:The heart and mediastinum size and configuration are   within normal limits.      BONES: Visualized osseous structures are intact.    IMPRESSION:   No radiographic evidence of active chest disease.

## 2022-05-02 NOTE — CONSULT NOTE ADULT - ASSESSMENT
88 y/o Male with h/o HTN on Metoprolol, BPH, early dementia, anxiety disorder, urinary retention s/p suprapubic catheter was admitted on 4/30 for increased weakness. Pt's wife states that pt has been weak for the last 2 days PTA and she has been unable to get him up. wife also stated that pt has had more trouble talking, but states that this is acute on chronic. no reported fever at home. The urine in bag is reported dark with sediment and strong smelling. In ER he received ceftriaxone.    1. Pyuria. Probable UTI. BPH. Urinary retention s/p suprapubic tube.  -obtain urine c/s  -no clinical signs of sepsis   88 y/o Male with h/o HTN on Metoprolol, BPH, early dementia, anxiety disorder, urinary retention s/p suprapubic catheter was admitted on 4/30 for increased weakness. Pt's wife states that pt has been weak for the last 2 days PTA and she has been unable to get him up. wife also stated that pt has had more trouble talking, but states that this is acute on chronic. no reported fever at home. The urine in bag is reported dark with sediment and strong smelling. In ER he received ceftriaxone.    1. Pyuria. Probable UTI. BPH. Urinary retention s/p suprapubic tube.  -started on ceftriaxone on admission  -encephalopathy is improved - mote alert; poorly verbal  -no clinical signs of sepsis  -urine in bag is clear at this time  -continue ceftriaxone 1 gm IV qd  -reason for abx use and side effects reviewed with patient; monitor BMP   -old chart reviewed to assess prior cultures  -urology evaluation  -monitor temps  -f/u CBC  -supportive care  2. Other issues:   -care per medicine

## 2022-05-02 NOTE — PROGRESS NOTE ADULT - ASSESSMENT
87 M hx HTN on Metoprolol, BPH, early dementia anxiety, SP catheter admitted for:     # Pyuria. Probable UTI. BPH. Urinary retention s/p suprapubic tube.  -started on ceftriaxone on admission  -encephalopathy is improved - mote alert; poorly verbal  -no clinical signs of sepsis  -urine in bag is clear at this time  -continue ceftriaxone 1 gm IV qd  -  consult - Dr. Georges to change Suprapubic catheter - pt/ wife brought special catheter as pt. is allergic to standard catheters   -monitor temps  -f/u CBC  ID consult appreciated     #Dementia, possibly superimposed with acute encephalopathy d/t complicated UTI  #Early Parkinson's Dz,  pt has several cardinal features, and wife's story corroborates clinical picture  -Continue ceftriaxone for UTI  -Correct any underlying metabolic abnormalities  -Start low dose Sinemet 25/100 QD and titrate  -Speech and swallow eval  -PT eval  - Neuro consult appreciated       #Bradycardia/Sinus pauses  C/w tele: 1st degree AV block, HR lowest in 30s,   resolved, no bradycardia on tele  Cardiac consult - Dr. Garcia - appreciated     #ZELDA on CKD III  - baseline CR about 3.2 , trending down with IV hydration   - C/w gentle IVF   - Losartan on hold   - Monitor UO  - Kidney sono with atrophic kidneys, no hydro   - outpatient follow up wit nephrology - pt. moving to [place] - d/w wife     # HTN   - was hypotensive on admission, resolved with IVF bolus  -continue to hold  losartan for now  - on decreased dose of  toprol Xl with parameters will readjust based on daily BP   - monitor     DVT proph  -heparin

## 2022-05-02 NOTE — PHYSICAL THERAPY INITIAL EVALUATION ADULT - ACTIVE RANGE OF MOTION EXAMINATION, REHAB EVAL
mod decreased overhead shoulder elevation LUE ; mild decrease shoulder elevation R dominant  UE ; adaptive shortening B heelcord /hamstring tendons BLEs

## 2022-05-02 NOTE — PROGRESS NOTE ADULT - SUBJECTIVE AND OBJECTIVE BOX
Routine Urology consult called for this pt. Pt reports his urologist is Dr. Georges. Please call Dr. Georges' office for this consult. Unit secretary notified and states will call

## 2022-05-02 NOTE — PHYSICAL THERAPY INITIAL EVALUATION ADULT - MARITAL STATUS
wife at bedside ,very concerned & supportive ,she is 82 years old and has DJD R knee with gait antalgia; they have 3 adult children ,the most helpful is son Michael in [place] so they will be relocating to live near him/

## 2022-05-02 NOTE — CONSULT NOTE ADULT - ASSESSMENT
87 yr old M with PMHx of HTN, BPH, early dementia, anxiety, SP catheter, was brought to  ED on 4/20/22 by wife for increasing weakness for the past several days. I spoke to the pt's wife, who reported that lately he's been having difficulty getting up from a sitting position, and she's having a hard time helping him. When he does get up, he 'shuffles' and takes small steps. This has been worsening to the point where she can no longer get him up by her self. At baseline, he has dementia and difficulty walking. He does not have a neurologist.     #Dementia, possibly superimposed with acute encephalopathy d/t complicated UTI    #Early Parkinson's Dz, pt has several cardinal features, and wife's story corroborates clinical picture    -Continue ceftriaxone for UTI  -Correct any underlying metabolic abnormalities  -Start low dose Sinemet 25/100 TID  -Speech and swallow eval  -PT eval  -DVT prophylaxis  -Telemonitoring for bradycardia 87 yr old M with PMHx of HTN, BPH, early dementia, anxiety, SP catheter, was brought to  ED on 4/20/22 by wife for increasing weakness for the past several days. I spoke to the pt's wife, who reported that lately he's been having difficulty getting up from a sitting position, and she's having a hard time helping him. When he does get up, he 'shuffles' and takes small steps. This has been worsening to the point where she can no longer get him up by her self. At baseline, he has dementia and difficulty walking. He does not have a neurologist.     #Dementia, possibly superimposed with acute encephalopathy d/t complicated UTI    #Early Parkinson's Dz, pt has several cardinal features, and wife's story corroborates clinical picture    -Continue ceftriaxone for UTI  -Correct any underlying metabolic abnormalities  -Start low dose Sinemet 25/100 and titrate  -Speech and swallow eval  -PT eval  -DVT prophylaxis  -Telemonitoring for bradycardia 87 yr old M with PMHx of HTN, BPH, early dementia, anxiety, SP catheter, was brought to  ED on 4/20/22 by wife for increasing weakness for the past several days. I spoke to the pt's wife, who reported that lately he's been having difficulty getting up from a sitting position, and she's having a hard time helping him. When he does get up, he 'shuffles' and takes small steps. This has been worsening to the point where she can no longer get him up by her self. At baseline, he has dementia and difficulty walking. He does not have a neurologist.     #Dementia, possibly superimposed with acute encephalopathy d/t complicated UTI    #Early Parkinson's Dz, pt has several cardinal features, and wife's story corroborates clinical picture    -Continue ceftriaxone for UTI  -Correct any underlying metabolic abnormalities  -Start low dose Sinemet 25/100 QD and titrate  -Speech and swallow eval  -PT eval  -DVT prophylaxis  -Telemonitoring for bradycardia

## 2022-05-02 NOTE — PHYSICAL THERAPY INITIAL EVALUATION ADULT - PATIENT/FAMILY/SIGNIFICANT OTHER GOALS STATEMENT, PT EVAL
wife reports pt having more difficulty standing/walking beginning on Saturday but waited >24 hours to bring to hospital

## 2022-05-02 NOTE — PHYSICAL THERAPY INITIAL EVALUATION ADULT - IMPAIRMENTS FOUND, PT EVAL
arousal, attention, and cognition/cognitive impairment/gait, locomotion, and balance/integumentary integrity/muscle strength/poor safety awareness/posture/tone

## 2022-05-02 NOTE — PHYSICAL THERAPY INITIAL EVALUATION ADULT - GAIT DEVIATIONS NOTED, PT EVAL
Called by rapid response team as the patient had a syncopal episode  He was also noted to be bradycardic with an abnormal ECG     He was started on a dopamine infusion and transferred to the critical care unit  BP (!) 91/46   Pulse (!) 54   Temp 97 6 °F (36 4 °C)   Resp 18   Wt 50 3 kg (111 lb)   SpO2 99%   BMI 18 47 kg/m²        Currently on my exam he is hemodynamically stable  He does not have any chest pain, his main complaint is pain that continues in his leg  Upon review the EKG appears to be Mobitz type 1  On questioning, the family was there in the room when this happened, stated that he was in severe pain and then complained of dizziness and nausea and then passed out  Currently he is in sinus bradycardia  Assessment and plan:  Likely transient second-degree heart block type 1 due to vasovagal reaction from pain  Will continue to monitor on telemetry in case he has further episodes of bradycardia/heart block  Watch off of dopamine for now  Continue heparin, management of peripheral vascular disease for vascular surgery  Hold off on antihypertensives as his blood pressure has been low  Pain management for his leg 
stooped posture, head/neck flexed downward,,narrow based gait ,mild gait instability/decreased step length/decreased stride length

## 2022-05-02 NOTE — PHYSICAL THERAPY INITIAL EVALUATION ADULT - FOLLOWS COMMANDS/ANSWERS QUESTIONS, REHAB EVAL
often requires repetition or clarification, demonstration/initiation by PT to elicit appropriate follow thru; he is internally distractible/75% of the time
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
## 2022-05-02 NOTE — PHYSICAL THERAPY INITIAL EVALUATION ADULT - BALANCE DISTURBANCE, SYSTEM IMPAIRMENT CONTRIBUTE, REHAB EVAL
?Parkinsonism ,dementia, decreased strength & postural control/cognitive/neuromuscular/musculoskeletal

## 2022-05-02 NOTE — PHYSICAL THERAPY INITIAL EVALUATION ADULT - LEVEL OF INDEPENDENCE: SUPINE/SIT, REHAB EVAL
pt very pre-occupied with IV L forearm ,therefore disconnected IV temporarily to allow pt to focus on transfer/gait training/moderate assist (50% patients effort)

## 2022-05-02 NOTE — PHYSICAL THERAPY INITIAL EVALUATION ADULT - PERTINENT HX OF CURRENT PROBLEM, REHAB EVAL
per wife,patient is having much more trouble getting up from seated position and when he does get up gait is very shuffling ,+tremors ,AMS,weakness

## 2022-05-03 LAB
ANION GAP SERPL CALC-SCNC: 7 MMOL/L — SIGNIFICANT CHANGE UP (ref 5–17)
BUN SERPL-MCNC: 37 MG/DL — HIGH (ref 7–23)
CALCIUM SERPL-MCNC: 9 MG/DL — SIGNIFICANT CHANGE UP (ref 8.5–10.1)
CHLORIDE SERPL-SCNC: 113 MMOL/L — HIGH (ref 96–108)
CO2 SERPL-SCNC: 21 MMOL/L — LOW (ref 22–31)
CREAT SERPL-MCNC: 2.36 MG/DL — HIGH (ref 0.5–1.3)
EGFR: 26 ML/MIN/1.73M2 — LOW
GLUCOSE SERPL-MCNC: 98 MG/DL — SIGNIFICANT CHANGE UP (ref 70–99)
HCT VFR BLD CALC: 26.8 % — LOW (ref 39–50)
HGB BLD-MCNC: 8.6 G/DL — LOW (ref 13–17)
MCHC RBC-ENTMCNC: 29 PG — SIGNIFICANT CHANGE UP (ref 27–34)
MCHC RBC-ENTMCNC: 32.1 GM/DL — SIGNIFICANT CHANGE UP (ref 32–36)
MCV RBC AUTO: 90.2 FL — SIGNIFICANT CHANGE UP (ref 80–100)
PLATELET # BLD AUTO: 165 K/UL — SIGNIFICANT CHANGE UP (ref 150–400)
POTASSIUM SERPL-MCNC: 4.6 MMOL/L — SIGNIFICANT CHANGE UP (ref 3.5–5.3)
POTASSIUM SERPL-SCNC: 4.6 MMOL/L — SIGNIFICANT CHANGE UP (ref 3.5–5.3)
RBC # BLD: 2.97 M/UL — LOW (ref 4.2–5.8)
RBC # FLD: 14.3 % — SIGNIFICANT CHANGE UP (ref 10.3–14.5)
SODIUM SERPL-SCNC: 141 MMOL/L — SIGNIFICANT CHANGE UP (ref 135–145)
WBC # BLD: 7.05 K/UL — SIGNIFICANT CHANGE UP (ref 3.8–10.5)
WBC # FLD AUTO: 7.05 K/UL — SIGNIFICANT CHANGE UP (ref 3.8–10.5)

## 2022-05-03 PROCEDURE — 99232 SBSQ HOSP IP/OBS MODERATE 35: CPT

## 2022-05-03 PROCEDURE — 51705 CHANGE OF BLADDER TUBE: CPT

## 2022-05-03 RX ORDER — QUETIAPINE FUMARATE 200 MG/1
25 TABLET, FILM COATED ORAL ONCE
Refills: 0 | Status: COMPLETED | OUTPATIENT
Start: 2022-05-03 | End: 2022-05-03

## 2022-05-03 RX ADMIN — CARBIDOPA AND LEVODOPA 1 TABLET(S): 25; 100 TABLET ORAL at 11:41

## 2022-05-03 RX ADMIN — Medication 75 MILLIGRAM(S): at 11:41

## 2022-05-03 RX ADMIN — HEPARIN SODIUM 5000 UNIT(S): 5000 INJECTION INTRAVENOUS; SUBCUTANEOUS at 06:30

## 2022-05-03 RX ADMIN — Medication 1 PATCH: at 11:41

## 2022-05-03 RX ADMIN — HEPARIN SODIUM 5000 UNIT(S): 5000 INJECTION INTRAVENOUS; SUBCUTANEOUS at 14:12

## 2022-05-03 RX ADMIN — HEPARIN SODIUM 5000 UNIT(S): 5000 INJECTION INTRAVENOUS; SUBCUTANEOUS at 22:47

## 2022-05-03 RX ADMIN — Medication 75 MILLIGRAM(S): at 22:47

## 2022-05-03 RX ADMIN — QUETIAPINE FUMARATE 25 MILLIGRAM(S): 200 TABLET, FILM COATED ORAL at 22:47

## 2022-05-03 RX ADMIN — Medication 1 MILLIGRAM(S): at 11:41

## 2022-05-03 RX ADMIN — TAMSULOSIN HYDROCHLORIDE 0.4 MILLIGRAM(S): 0.4 CAPSULE ORAL at 22:47

## 2022-05-03 RX ADMIN — Medication 75 MILLIGRAM(S): at 11:40

## 2022-05-03 RX ADMIN — CEFTRIAXONE 100 MILLIGRAM(S): 500 INJECTION, POWDER, FOR SOLUTION INTRAMUSCULAR; INTRAVENOUS at 14:13

## 2022-05-03 NOTE — PROGRESS NOTE ADULT - SUBJECTIVE AND OBJECTIVE BOX
Date of service: 22 @ 09:59    Lying in bed in NAD  Alert and mild confused  Verbal    ROS: no fever or chills; denies pain    MEDICATIONS  (STANDING):  carbidopa/levodopa  25/100 1 Tablet(s) Oral daily  cefTRIAXone   IVPB 1000 milliGRAM(s) IV Intermittent every 24 hours  folic acid 1 milliGRAM(s) Oral daily  heparin   Injectable 5000 Unit(s) SubCutaneous every 8 hours  metoprolol succinate ER 75 milliGRAM(s) Oral daily  nicotine - 21 mG/24Hr(s) Patch 1 patch Transdermal daily  pregabalin 75 milliGRAM(s) Oral two times a day  sodium chloride 0.9%. 1000 milliLiter(s) (50 mL/Hr) IV Continuous <Continuous>  tamsulosin 0.4 milliGRAM(s) Oral at bedtime    Vital Signs Last 24 Hrs  T(C): 36.7 (03 May 2022 09:03), Max: 36.8 (02 May 2022 23:22)  T(F): 98.1 (03 May 2022 09:03), Max: 98.2 (02 May 2022 23:22)  HR: 60 (03 May 2022 09:03) (55 - 61)  BP: 168/65 (03 May 2022 09:03) (129/72 - 168/65)  BP(mean): --  RR: 17 (03 May 2022 09:03) (16 - 17)  SpO2: 94% (03 May 2022 09:03) (93% - 94%)     Physical exam:    Constitutional:  No acute distress  HEENT: NC/AT, EOMI, PERRLA, conjunctivae clear; ears and nose atraumatic  Neck: supple; thyroid not palpable  Back: no tenderness  Respiratory: respiratory effort normal; clear to auscultation  Cardiovascular: S1S2 regular, no murmurs  Abdomen: soft, not tender, not distended, positive BS  Genitourinary: no suprapubic tenderness  suprapubic catheter  Lymphatic: no LN palpable  Musculoskeletal: no muscle tenderness, no joint swelling or tenderness  Extremities: no pedal edema  Neurological/ Psychiatric: AxOx3, moving all extremities  Skin: no rashes; no palpable lesions    Labs: all available labs reviewed                        9.1    6.52  )-----------( 156      ( 02 May 2022 08:14 )             29.2     05-02    140  |  112<H>  |  36<H>  ----------------------------<  105<H>  4.4   |  25  |  2.64<H>    Ca    8.9      02 May 2022 08:14    Urinalysis Basic - ( 2022 17:30 )    Color: Yellow / Appearance: very cloudy / S.010 / pH: x  Gluc: x / Ketone: Negative  / Bili: Negative / Urobili: Negative   Blood: x / Protein: 30 mg/dL / Nitrite: Positive   Leuk Esterase: Moderate / RBC: 3-5 /HPF / WBC 26-50   Sq Epi: x / Non Sq Epi: Negative / Bacteria: TNTC    COVID-19 PCR: NotDetec (22 @ 10:00)    Radiology: all available radiological tests reviewed    < from: US Kidney and Bladder (22 @ 17:52) >  Atrophic kidneys. No hydronephrosis.  Small left renal cyst.  Nonvisualization of the urinary bladder.  < end of copied text >      Advanced directives addressed: full resuscitation

## 2022-05-03 NOTE — PROGRESS NOTE ADULT - SUBJECTIVE AND OBJECTIVE BOX
CC: Bradycardia    HPI: 87 M hx HTN on Metoprolol, BPH, early dementia anxiety, SP catheter brought in by wife  as Pt has been weak for the last 2 days and she has been unable to get him up.  PTs wife also states that pt has had more trouble talking, but states that this is acute on chronic, no reported fever, vomiting, diarrhea.  Wife at bedside, states cath was  changed  about  3 weeks ago. Since then urine dark with sediment and strong smelling.   ED course: CXR wet read-> clear. UA pending however has SP catheter. Lactate normal .WBC 19. Scr 3.9. s/p ceftriaxone. LFTs nl. COVID negative. No e/o hypoxia and no e/o hypotension.       Vital Signs Last 24 Hrs  T(C): 36.4 (02 May 2022 09:56), Max: 36.7 (01 May 2022 21:25)  T(F): 97.5 (02 May 2022 09:56), Max: 98.1 (01 May 2022 21:25)  HR: 68 (02 May 2022 09:56) (55 - 68)  BP: 168/72 (02 May 2022 09:56) (125/60 - 168/72)  BP(mean): --  RR: 16 (02 May 2022 09:56) (16 - 17)  SpO2: 97% (02 May 2022 09:56) (91% - 97%)    REVIEW OF SYSTEMS:  unable to obtain due to confusion      PHYSICAL EXAM:  General: Well developed; malnourished, frail elderly male,  in no acute distress  Eyes: PERRL,  conjunctiva and sclera clear  Head: Normocephalic; atraumatic  ENMT: No nasal discharge; airway clear  Neck: Supple; non tender; no masses  Respiratory:  Decreased BS, No wheezes, rales or rhonchi  Cardiovascular: Regular rate and rhythm. S1 and S2 Normal   Gastrointestinal: Soft non-tender non-distended; Normal bowel sounds  Genitourinary: No  suprapubic  tenderness, suprapubic  cath in place   Extremities: No pitting  edema  Vascular: Peripheral pulses palpable 2+ bilaterally  Neurological: Alert and oriented x1-2, confused,  hypophonic voice, mild tremors   Skin: Warm and dry. No acute rash  Lymph Nodes: No acute cervical adenopathy  Musculoskeletal: No joint edema or erythema       LABS:                         8.8    10.62 )-----------( 134      ( 01 May 2022 07:57 )             29.3     01 May 2022 07:57    137    |  109    |  52     ----------------------------<  126    4.3     |  21     |  3.27     Ca    8.8        01 May 2022 07:57    TPro  7.5    /  Alb  2.7    /  TBili  1.0    /  DBili  x      /  AST  14     /  ALT  12     /  AlkPhos  86     2022 10:51      CAPILLARY BLOOD GLUCOSE        LIVER FUNCTIONS - ( 2022 10:51 )  Alb: 2.7 g/dL / Pro: 7.5 gm/dL / ALK PHOS: 86 U/L / ALT: 12 U/L / AST: 14 U/L / GGT: x           Urinalysis Basic - ( 2022 17:30 )    Color: Yellow / Appearance: very cloudy / S.010 / pH: x  Gluc: x / Ketone: Negative  / Bili: Negative / Urobili: Negative   Blood: x / Protein: 30 mg/dL / Nitrite: Positive   Leuk Esterase: Moderate / RBC: 3-5 /HPF / WBC 26-50   Sq Epi: x / Non Sq Epi: Negative / Bacteria: TNTC        MEDICATIONS  (STANDING):  cefTRIAXone   IVPB 1000 milliGRAM(s) IV Intermittent every 24 hours  heparin   Injectable 5000 Unit(s) SubCutaneous every 8 hours  metoprolol succinate ER 75 milliGRAM(s) Oral daily  pregabalin 75 milliGRAM(s) Oral two times a day  sodium chloride 0.9%. 1000 milliLiter(s) (50 mL/Hr) IV Continuous <Continuous>  tamsulosin 0.4 milliGRAM(s) Oral at bedtime    MEDICATIONS  (PRN):  acetaminophen     Tablet .. 650 milliGRAM(s) Oral every 6 hours PRN Temp greater or equal to 38C (100.4F), Mild Pain (1 - 3)  aluminum hydroxide/magnesium hydroxide/simethicone Suspension 30 milliLiter(s) Oral every 4 hours PRN Dyspepsia  melatonin 3 milliGRAM(s) Oral at bedtime PRN Insomnia  ondansetron Injectable 4 milliGRAM(s) IV Push every 8 hours PRN Nausea and/or Vomiting      RADIOLOGY & ADDITIONAL TESTS:      ACC: [id number] EXAM:  US KIDNEYS AND BLADDER                        PROCEDURE DATE:  2022      FINDINGS:  Right kidney: 7.9 cm. No hydronephrosis. Renal cortical atrophy.    Left kidney: 8.6 cm. No hydronephrosis. 2.7 cm cyst in the interpolar   region.    Urinary bladder: Not visualized.    IMPRESSION:  Atrophic kidneys. No hydronephrosis.  Small left renal cyst.  Nonvisualization of the urinary bladder.          ACC: [id number] EXAM:  XR CHEST PORTABLE URGENT 1V                          PROCEDURE DATE:  2022      INTERPRETATION:  Portable chest radiograph    CLINICAL INFORMATION: Lethargy    TECHNIQUE:  Portable  AP chest radiograph.    COMPARISON:2019 chest radiograph .    FINDINGS:  CATHETERS AND TUBES: None    PULMONARY: Focal LEFT lung base peripheral lung airspace   opacity/infiltrate. LEFT upper zones and remaining RIGHT lung parenchyma   clear..   No pneumothorax.    HEART/VASCULAR:The heart and mediastinum size and configuration are   within normal limits.      BONES: Visualized osseous structures are intact.    IMPRESSION:   No radiographic evidence of active chest disease.     CC: Bradycardia    HPI: 87 M hx HTN on Metoprolol, BPH, early dementia anxiety, SP catheter brought in by wife  as Pt has been weak for the last 2 days and she has been unable to get him up.  PTs wife also states that pt has had more trouble talking, but states that this is acute on chronic, no reported fever, vomiting, diarrhea.  Wife at bedside, states cath was  changed  about  3 weeks ago. Since then urine dark with sediment and strong smelling.   ED course: CXR wet read-> clear. UA pending however has SP catheter. Lactate normal .WBC 19. Scr 3.9. s/p ceftriaxone. LFTs nl. COVID negative. No e/o hypoxia and no e/o hypotension.     Vital Signs Last 24 Hrs  T(C): 36.7 (03 May 2022 09:03), Max: 36.8 (02 May 2022 23:22)  T(F): 98.1 (03 May 2022 09:03), Max: 98.2 (02 May 2022 23:22)  HR: 60 (03 May 2022 09:03) (55 - 61)  BP: 168/65 (03 May 2022 09:03) (129/72 - 168/65)  BP(mean): --  RR: 17 (03 May 2022 09:03) (16 - 17)  SpO2: 94% (03 May 2022 09:03) (93% - 94%)    REVIEW OF SYSTEMS:  unable to obtain due to confusion      PHYSICAL EXAM:  General: Well developed; malnourished, frail elderly male,  in no acute distress  Eyes: PERRL,  conjunctiva and sclera clear  Head: Normocephalic; atraumatic  ENMT: No nasal discharge; airway clear  Neck: Supple; non tender; no masses  Respiratory:  Decreased BS, No wheezes, rales or rhonchi  Cardiovascular: Regular rate and rhythm. S1 and S2 Normal   Gastrointestinal: Soft non-tender non-distended; Normal bowel sounds  Genitourinary: No  suprapubic  tenderness, suprapubic  cath in place   Extremities: No pitting  edema  Vascular: Peripheral pulses palpable 2+ bilaterally  Neurological: Alert and oriented x1-2, confused,  hypophonic voice, mild tremors   Skin: Warm and dry. No acute rash  Lymph Nodes: No acute cervical adenopathy  Musculoskeletal: No joint edema or erythema       LABS:                         8.8    10.62 )-----------( 134      ( 01 May 2022 07:57 )             29.3     01 May 2022 07:57    137    |  109    |  52     ----------------------------<  126    4.3     |  21     |  3.27     Ca    8.8        01 May 2022 07:57    TPro  7.5    /  Alb  2.7    /  TBili  1.0    /  DBili  x      /  AST  14     /  ALT  12     /  AlkPhos  86     2022 10:51      CAPILLARY BLOOD GLUCOSE        LIVER FUNCTIONS - ( 2022 10:51 )  Alb: 2.7 g/dL / Pro: 7.5 gm/dL / ALK PHOS: 86 U/L / ALT: 12 U/L / AST: 14 U/L / GGT: x           Urinalysis Basic - ( 2022 17:30 )    Color: Yellow / Appearance: very cloudy / S.010 / pH: x  Gluc: x / Ketone: Negative  / Bili: Negative / Urobili: Negative   Blood: x / Protein: 30 mg/dL / Nitrite: Positive   Leuk Esterase: Moderate / RBC: 3-5 /HPF / WBC 26-50   Sq Epi: x / Non Sq Epi: Negative / Bacteria: TNTC        MEDICATIONS  (STANDING):  cefTRIAXone   IVPB 1000 milliGRAM(s) IV Intermittent every 24 hours  heparin   Injectable 5000 Unit(s) SubCutaneous every 8 hours  metoprolol succinate ER 75 milliGRAM(s) Oral daily  pregabalin 75 milliGRAM(s) Oral two times a day  sodium chloride 0.9%. 1000 milliLiter(s) (50 mL/Hr) IV Continuous <Continuous>  tamsulosin 0.4 milliGRAM(s) Oral at bedtime    MEDICATIONS  (PRN):  acetaminophen     Tablet .. 650 milliGRAM(s) Oral every 6 hours PRN Temp greater or equal to 38C (100.4F), Mild Pain (1 - 3)  aluminum hydroxide/magnesium hydroxide/simethicone Suspension 30 milliLiter(s) Oral every 4 hours PRN Dyspepsia  melatonin 3 milliGRAM(s) Oral at bedtime PRN Insomnia  ondansetron Injectable 4 milliGRAM(s) IV Push every 8 hours PRN Nausea and/or Vomiting      RADIOLOGY & ADDITIONAL TESTS:      ACC: [id number] EXAM:  US KIDNEYS AND BLADDER                        PROCEDURE DATE:  2022      FINDINGS:  Right kidney: 7.9 cm. No hydronephrosis. Renal cortical atrophy.    Left kidney: 8.6 cm. No hydronephrosis. 2.7 cm cyst in the interpolar   region.    Urinary bladder: Not visualized.    IMPRESSION:  Atrophic kidneys. No hydronephrosis.  Small left renal cyst.  Nonvisualization of the urinary bladder.          ACC: [id number] EXAM:  XR CHEST PORTABLE URGENT 1V                          PROCEDURE DATE:  2022      INTERPRETATION:  Portable chest radiograph    CLINICAL INFORMATION: Lethargy    TECHNIQUE:  Portable  AP chest radiograph.    COMPARISON:2019 chest radiograph .    FINDINGS:  CATHETERS AND TUBES: None    PULMONARY: Focal LEFT lung base peripheral lung airspace   opacity/infiltrate. LEFT upper zones and remaining RIGHT lung parenchyma   clear..   No pneumothorax.    HEART/VASCULAR:The heart and mediastinum size and configuration are   within normal limits.      BONES: Visualized osseous structures are intact.    IMPRESSION:   No radiographic evidence of active chest disease.

## 2022-05-03 NOTE — PROGRESS NOTE ADULT - ASSESSMENT
88 y/o Male with h/o HTN on Metoprolol, BPH, early dementia, anxiety disorder, urinary retention s/p suprapubic catheter was admitted on 4/30 for increased weakness. Pt's wife states that pt has been weak for the last 2 days PTA and she has been unable to get him up. wife also stated that pt has had more trouble talking, but states that this is acute on chronic. no reported fever at home. The urine in bag is reported dark with sediment and strong smelling. In ER he received ceftriaxone.    1. Pyuria. Probable UTI. BPH. Urinary retention s/p suprapubic tube.  -alert  -encephalopathy is improved - poorly verbal  -no clinical signs of sepsis  -urine in bag is clear at this time  -on ceftriaxone 1 gm IV qd # 3  -tolerating abx well so far; no side effects noted  -clinically improved  -continue abx coverage; plan to change to oral abx when ready for d/c  -urology evaluation  -monitor temps  -f/u CBC  -supportive care  2. Other issues:   -care per medicine

## 2022-05-03 NOTE — PROGRESS NOTE ADULT - SUBJECTIVE AND OBJECTIVE BOX
The patient was seen and examined. Confused.  No chest pain.  No shortness of breath.  No palpitations.  HR stable.    Initial Consult HPI  ________________________________  HU MCINTOSH is a 87y year old Male with a past medical history of early onset dementia, hypertension, BPH, history of suprapubic catheter, who presented to the ER for weakness.  Patient unable to provide history due to dementia.  Per wife also states that pt has had more trouble talking, but states that this is acute on chronic. no reported fever, vomiting, diarrhea. wife also reports pt has suprapubic catheter. no hx of heart attack, stroke. no hx of parkinson's. smoker. Wife at bedside, state change out was 3 weeks ago. Since then urine dark with sediment and strong smelling.     On telemetry, had sinus rhythm blocked PAC and bradycardia.  Cardiology consulted.  Patient remains confused.  Unable to provide history.    ________________________________  Review of systems: A 10 point review of system has been performed, and is negative except for what has been mentioned in the above history of present illness.     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    ALLERGIES:  No Known Allergies    Home Medications:  Lisa COVID-19 Vaccine PF intramuscular suspension: 0.5 milliliter(s) intramuscular once (30 Apr 2022 15:56)  losartan 100 mg oral tablet: 1 tab(s) orally once a day  ***per wife*** (30 Apr 2022 15:56)  metoprolol succinate 100 mg oral tablet, extended release: 1 tab(s) orally once a day (30 Apr 2022 15:56)  pregabalin 75 mg oral capsule: 1 cap(s) orally 2 times a day (30 Apr 2022 15:56)  tamsulosin 0.4 mg oral capsule: 1 cap(s) orally once a day (at bedtime) (30 Apr 2022 15:56)    MEDICATIONS  (STANDING):  carbidopa/levodopa  25/100 1 Tablet(s) Oral daily  cefTRIAXone   IVPB 1000 milliGRAM(s) IV Intermittent every 24 hours  folic acid 1 milliGRAM(s) Oral daily  heparin   Injectable 5000 Unit(s) SubCutaneous every 8 hours  metoprolol succinate ER 75 milliGRAM(s) Oral daily  nicotine - 21 mG/24Hr(s) Patch 1 patch Transdermal daily  pregabalin 75 milliGRAM(s) Oral two times a day  sodium chloride 0.9%. 1000 milliLiter(s) (50 mL/Hr) IV Continuous <Continuous>  tamsulosin 0.4 milliGRAM(s) Oral at bedtime    MEDICATIONS  (PRN):  acetaminophen     Tablet .. 650 milliGRAM(s) Oral every 6 hours PRN Temp greater or equal to 38C (100.4F), Mild Pain (1 - 3)  aluminum hydroxide/magnesium hydroxide/simethicone Suspension 30 milliLiter(s) Oral every 4 hours PRN Dyspepsia  melatonin 3 milliGRAM(s) Oral at bedtime PRN Insomnia  ondansetron Injectable 4 milliGRAM(s) IV Push every 8 hours PRN Nausea and/or Vomiting      Vital Signs Last 24 Hrs  T(C): 36.7 (03 May 2022 09:03), Max: 36.8 (02 May 2022 23:22)  T(F): 98.1 (03 May 2022 09:03), Max: 98.2 (02 May 2022 23:22)  HR: 60 (03 May 2022 09:03) (55 - 61)  BP: 168/65 (03 May 2022 09:03) (129/72 - 168/65)  BP(mean): --  RR: 17 (03 May 2022 09:03) (16 - 17)  SpO2: 94% (03 May 2022 09:03) (93% - 94%)  I&O's Summary    02 May 2022 07:01  -  03 May 2022 07:00  --------------------------------------------------------  IN: 550 mL / OUT: 1150 mL / NET: -600 mL      ________________________________  GENERAL APPEARANCE:  No acute distress  HEAD: normocephalic, atraumatic  NECK: supple, no jugular venous distention, no carotid bruit    HEART: Regular rate and rhythm, S1, S2 normal, 1/6 murmur    CHEST:  No anterior chest wall tenderness    LUNGS:  Clear to auscultation, without any wheezing, rhonchi or rales    ABDOMEN: soft, nontender, nondistended, with positive bowel sounds appreciated  EXTREMITIES: no edema.   NEURO: Awake but confused  PSYC:  Normal affect  SKIN:  Dry  ________________________________   TELEMETRY: Sinus rhythm with blocked PAC.  No significant bradycardia or high degree AV block.    ECG: Sinus rhythm with first-degree AV block and PACs    LABS:                        9.1    6.52  )-----------( 156      ( 02 May 2022 08:14 )             29.2             05-02    140  |  112<H>  |  36<H>  ----------------------------<  105<H>  4.4   |  25  |  2.64<H>    Ca    8.9      02 May 2022 08:14        ________________________________    RADIOLOGY & ADDITIONAL STUDIES:  IMPRESSION:  No CT evidence of acute intracranial pathology.  Follow-up MRI may be   obtained for more sensitive evaluation.    There is a 1.8 cm superficial skin lesion in the high left parietal   scalp.  Correlate with direct visualization to exclude skin cancer.    Echo 5/2/2022   The left ventricle is normal in size, wall thickness, wall motion and contractility.   Estimated left ventricular ejection fraction is 50 %.   The mitral valve leaflets appear minimally thickened.   Mild (1+) mitral regurgitation is present.   EA reversal of the mitral inflow consistent with reduced compliance of the left ventricle.   Trace tricuspid valve regurgitation is present.    ________________________________    ASSESSMENT:  Bradycardia, blocked PACs  Hypertension  Advanced dementia  Urinary tract infection  Acute on chronic renal insufficiency-improving  Encephalopathy      PLAN:  Echo noted - EF preserved.  Cr improved.  Cont BB - no bradycardia on tele - can DC tele.  Will follow as needed.  __________________________________________________________________________  Thank you for allowing me to participate in the care of your patient. Please contact me should any questions arise.    NICHOLE Garcia DO, FACC  Office: [phone]

## 2022-05-03 NOTE — CONSULT NOTE ADULT - ASSESSMENT
88 yo male with PMH as above including chronic SP catheter- known to Dr. Georges. Pt's primary team placed a consult for Dr. Georges and office was contacted multiple times. Hospitalist NP Aramis called requesting SPT change due to pt with UTI, states Dr. Georges is not coming in and patient has an appointment with him next week.    - SPT changed at bedside without difficulty- Pt has 20 cc sterile water in SPT Balloon- should be noted when exchanging the SPT  - F/U cultures/sensitivities and treat accordingly  - Defer rest of the care as per Dr. Georges/ F/U outpatient with [facility]    Case discussed with Dr. Venegas

## 2022-05-03 NOTE — PROGRESS NOTE ADULT - ASSESSMENT
87 M hx HTN on Metoprolol, BPH, early dementia anxiety, SP catheter admitted for:     # Pyuria. Probable UTI. BPH. Urinary retention s/p suprapubic tube.  -started on ceftriaxone on admission  -encephalopathy is improved - mote alert; poorly verbal  -no clinical signs of sepsis  -urine in bag is clear at this time  -continue ceftriaxone 1 gm IV qd  -  consult - Dr. Georges to change Suprapubic catheter - pt. wife brought special catheter as pt. is allergic to standard catheters   -monitor temps  -f/u CBC  ID consult appreciated     #Dementia, possibly superimposed with acute encephalopathy d/t complicated UTI  #Early Parkinson's Dz,  agitated on 5/3 AM - taken measures to calm down   enhanced supervision   -Continue ceftriaxone for UTI  -Correct any underlying metabolic abnormalities  -Start low dose Sinemet 25/100 QD and titrate  -Speech and swallow eval  - Neuro consult appreciated     #Anemia - suspect anemia of chronic disease due to CKD  will check stool guaiac and monitor H/H     #Bradycardia/Sinus pauses  C/w tele: 1st degree AV block, HR lowest in 30s,   resolved, no bradycardia on tele  Echo - preserved EF, no acute findings   Cardiac consult - Dr. Garcia - appreciated     #ZELDA on CKD III  - baseline CR about 3.2 , trending down with IV hydration   - C/w gentle IVF   - Losartan on hold   - Monitor UO  - Kidney sono with atrophic kidneys, no hydro   - outpatient follow up wit nephrology - pt. moving to [place] - d/w wife     # HTN   - was hypotensive on admission, resolved with IVF bolus  -continue to hold  losartan for now  - on decreased dose of  toprol Xl with parameters will readjust based on daily BP   - monitor     DVT proph  -heparin  87 M hx HTN on Metoprolol, BPH, early dementia anxiety, SP catheter admitted for:     # Pyuria/ UTI due to the presence of suprapubic catheter  # BPH/Urinary retention s/p suprapubic tube.  -started on ceftriaxone on admission  -encephalopathy is improved - mote alert; poorly verbal  -no clinical signs of sepsis  -urine in bag is clear at this time  -continue ceftriaxone 1 gm IV qd  -  consult - Dr. Georges to change Suprapubic catheter - pt. wife brought special catheter as pt. is allergic to standard catheters   Dr. Georges did not see the pt - office called various times, asked [facility] Singh to change catheter - will d/w Dr. Venegas   -monitor temps  -f/u CBC  ID consult appreciated     #Dementia, possibly superimposed with acute encephalopathy d/t complicated UTI due to the presence of urinary catheter   #Early Parkinson's Dz,  agitated on 5/3 AM - taken measures to calm down   enhanced supervision   -Continue ceftriaxone for UTI  -Correct any underlying metabolic abnormalities  -Continue low dose Sinemet 25/100 QD, tomorrow can titrate to 1 tab BID x 2-3 days, then 1 tab TID - D/W Dr. Taylor   -Speech and swallow eval  - Neuro consult appreciated     #Anemia - suspect anemia of chronic disease due to CKD  will check stool guaiac and monitor H/H     #Bradycardia/Sinus pauses  C/w tele: 1st degree AV block, HR lowest in 30s,   resolved, no bradycardia on tele  Echo - preserved EF, no acute findings   Cardiac consult - Dr. Garcia - appreciated     #ZELDA on CKD III  - baseline CR about 3.2 , trending down with IV hydration   - C/w gentle IVF   - Losartan on hold   - Monitor UO  - Kidney sono with atrophic kidneys, no hydro   - outpatient follow up wit nephrology - pt. moving to [place] - d/w wife     # HTN   - was hypotensive on admission, resolved with IVF bolus  -continue to hold  losartan for now  - on decreased dose of  toprol Xl with parameters will readjust based on daily BP   - monitor     DVT proph  -heparin

## 2022-05-03 NOTE — PROGRESS NOTE ADULT - NS ATTEND AMEND GEN_ALL_CORE FT
I saw the patient while he was walking with PT.  He was using a walker.  Posture was stooped. Good pace but short stride length with near scissoring.  ? of facial asymmetry. Discussed with wife who states that he looks the same as usual. Facial muscles do activate symmetrically.    Continue Sinemet titration as discussed.

## 2022-05-03 NOTE — PROGRESS NOTE ADULT - SUBJECTIVE AND OBJECTIVE BOX
No new complaints today, pt says he wants to go home.    ROS: As stated above, all others are negative.    Vital Signs Last 24 Hrs  T(C): 36.7 (03 May 2022 09:03), Max: 36.8 (02 May 2022 23:22)  T(F): 98.1 (03 May 2022 09:03), Max: 98.2 (02 May 2022 23:22)  HR: 60 (03 May 2022 09:03) (55 - 61)  BP: 168/65 (03 May 2022 09:03) (129/72 - 168/65)  BP(mean): --  RR: 17 (03 May 2022 09:03) (16 - 17)  SpO2: 94% (03 May 2022 09:03) (93% - 94%)    MEDICATIONS  (STANDING):  carbidopa/levodopa  25/100 1 Tablet(s) Oral daily  cefTRIAXone   IVPB 1000 milliGRAM(s) IV Intermittent every 24 hours  folic acid 1 milliGRAM(s) Oral daily  heparin   Injectable 5000 Unit(s) SubCutaneous every 8 hours  metoprolol succinate ER 75 milliGRAM(s) Oral daily  nicotine - 21 mG/24Hr(s) Patch 1 patch Transdermal daily  pregabalin 75 milliGRAM(s) Oral two times a day  sodium chloride 0.9%. 1000 milliLiter(s) (50 mL/Hr) IV Continuous <Continuous>  tamsulosin 0.4 milliGRAM(s) Oral at bedtime    MEDICATIONS  (PRN):  acetaminophen     Tablet .. 650 milliGRAM(s) Oral every 6 hours PRN Temp greater or equal to 38C (100.4F), Mild Pain (1 - 3)  aluminum hydroxide/magnesium hydroxide/simethicone Suspension 30 milliLiter(s) Oral every 4 hours PRN Dyspepsia  melatonin 3 milliGRAM(s) Oral at bedtime PRN Insomnia  ondansetron Injectable 4 milliGRAM(s) IV Push every 8 hours PRN Nausea and/or Vomiting      PHYSICAL EXAM:    General: Normocephalic, NAD  HEENT: PERRLA, EOMI  Neck: Supple.    Neurological exam:  HF: Awake, alert, oriented to self, not oriented to place or date. Slow affect, hypokinetic dysarthria   CN: JANI, EOMI, VFF, facial sensation normal, +masked facies  Motor: No pronator drift, Strength 4-5/5 in all 4 ext with rigidity throughout, +mild resting tremors B/L hands  Sens: Intact to light touch throughout  Reflexes: Symmetric and hyporeflexive, downgoing toes b/l  Coord:  Could not assess FNFA or dysmetria as pt could not follow commands. LUANA not intact  Gait/Balance: Cannot test      LABS:                         8.6    7.05  )-----------( 165      ( 03 May 2022 07:18 )             26.8     05-03    141  |  113<H>  |  37<H>  ----------------------------<  98  4.6   |  21<L>  |  2.36<H>    Ca    9.0      03 May 2022 07:18    RADIOLOGY:   CT Head No Cont (05.01.22 @ 21:08)   IMPRESSION:  No CT evidence of acute intracranial pathology.  Follow-up MRI may be   obtained for more sensitive evaluation.   No new complaints today, pt says he wants to go home.    ROS: As stated above, all others are negative.    Vital Signs Last 24 Hrs  T(C): 36.7 (03 May 2022 09:03), Max: 36.8 (02 May 2022 23:22)  T(F): 98.1 (03 May 2022 09:03), Max: 98.2 (02 May 2022 23:22)  HR: 60 (03 May 2022 09:03) (55 - 61)  BP: 168/65 (03 May 2022 09:03) (129/72 - 168/65)  BP(mean): --  RR: 17 (03 May 2022 09:03) (16 - 17)  SpO2: 94% (03 May 2022 09:03) (93% - 94%)    MEDICATIONS  (STANDING):  carbidopa/levodopa  25/100 1 Tablet(s) Oral daily  cefTRIAXone   IVPB 1000 milliGRAM(s) IV Intermittent every 24 hours  folic acid 1 milliGRAM(s) Oral daily  heparin   Injectable 5000 Unit(s) SubCutaneous every 8 hours  metoprolol succinate ER 75 milliGRAM(s) Oral daily  nicotine - 21 mG/24Hr(s) Patch 1 patch Transdermal daily  pregabalin 75 milliGRAM(s) Oral two times a day  sodium chloride 0.9%. 1000 milliLiter(s) (50 mL/Hr) IV Continuous <Continuous>  tamsulosin 0.4 milliGRAM(s) Oral at bedtime    MEDICATIONS  (PRN):  acetaminophen     Tablet .. 650 milliGRAM(s) Oral every 6 hours PRN Temp greater or equal to 38C (100.4F), Mild Pain (1 - 3)  aluminum hydroxide/magnesium hydroxide/simethicone Suspension 30 milliLiter(s) Oral every 4 hours PRN Dyspepsia  melatonin 3 milliGRAM(s) Oral at bedtime PRN Insomnia  ondansetron Injectable 4 milliGRAM(s) IV Push every 8 hours PRN Nausea and/or Vomiting      PHYSICAL EXAM:    General: Normocephalic, NAD  HEENT: PERRLA, EOMI  Neck: Supple.    Neurological exam:  HF: Awake, alert, oriented to self, not oriented to place or date. Slow affect, hypokinetic dysarthria   CN: JANI, EOMI, VFF, facial sensation normal, +masked facies  Motor: No pronator drift, Strength 4-5/5 in all 4 ext with rigidity throughout, +mild resting tremors B/L hands  Sens: Intact to light touch throughout  Reflexes: Symmetric and hyporeflexive, downgoing toes b/l  Coord:  Could not assess FNFA or dysmetria as pt could not follow commands. LUANA not intact  Gait/Balance: Not tested      LABS:                         8.6    7.05  )-----------( 165      ( 03 May 2022 07:18 )             26.8     05-03    141  |  113<H>  |  37<H>  ----------------------------<  98  4.6   |  21<L>  |  2.36<H>    Ca    9.0      03 May 2022 07:18    RADIOLOGY:   CT Head No Cont (05.01.22 @ 21:08)   IMPRESSION:  No CT evidence of acute intracranial pathology.  Follow-up MRI may be   obtained for more sensitive evaluation.

## 2022-05-03 NOTE — CDI QUERY NOTE - NSCDIOTHERTXTBX_GEN_ALL_CORE_HH
Per PN 5/3: " HPI: 87 M hx HTN on Metoprolol, BPH, early dementia anxiety, SP catheter"   " Wife at bedside, states cath was  changed  about  3 weeks ago. Since then urine dark with sediment and strong smelling. "  "Probable UTI. BPH. Urinary retention s/p suprapubic tube"  " complicated UTI "    Per PN 5/1 : " Suspected  Complicated UTI related to Suprapubic catheter "    patient on Rocephin IV qday    Please clarify if the complicated UTI is due to or related to the SP catheter  a) UTI due to or related to the presence of the suprapubic catheter  b) UTI due to or related to changing of the suprapubic catheter  c) UTI not associated with the suprapubic catheter  d) Unable to determine a cause and effect relationship between the UTI and suprapubic catheter

## 2022-05-03 NOTE — CONSULT NOTE ADULT - SUBJECTIVE AND OBJECTIVE BOX
HPI:  87 M hx HTN on Metoprolol, BPH, early dementia anxiety, SP catheter brought in by wife as pt's wife states that pt has been weak for the last 2 days and she has been unable to get him up.  per wife also states that pt has had more trouble talking, but states that this is acute on chronic. no reported fever, vomiting, diarrhea. wife also reports pt has suprapubic catheter. no hx of heart attack, stroke. no hx of parkinson's. smoker. Wife at bedside, state change out was 3 weeks ago. Since then urine dark with sediment and strong smelling.     ED course: CXR wet read-> clear. UA pending however has SP catheter. Lactate normal .WBC 19. Scr 3.9. s/p ceftriaxone. LFTs nl. COVID negative. No e/o hypoxia and no e/o hypotension.     88 yo male with PMH as above including chronic SP catheter- known to Dr. Georges. Pt's primary team placed a consult for Dr. Georges and office was contacted multiple times. Hospitalist NP Aramis called requesting SPT change due to pt with UTI, states Dr. Georges is not coming in and patient has an appointment with him next week. Pt is a poor historian due to dementia unable to provide any hx. Wife brought 20 Fr catheter from home and gave it to primary team as pt is allergic to ?latex.    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    Memory deficit     Pedal edema b/l    Poor historian     Urinary retention.     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     FAMILY HISTORY:  No pertinent family history in first degree relatives.    · Social Concerns: None     Tobacco Usage:  · Tobacco Usage	Current some day smoker     (30 Apr 2022 16:45)      PAST MEDICAL & SURGICAL HISTORY:  HTN (hypertension)    Anxiety    Urinary retention    Erosion of urethra due to catheterization of urinary tract, initial encounter    Memory deficit    Romero catheter in place    BPH (benign prostatic hyperplasia)    Pedal edema  b/l    Poor historian    Cigarette smoker    Leg pain, left  &quot;ankle&quot;.    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 1000 milliLiter(s) (50 mL/Hr) IV Continuous <Continuous>  tamsulosin 0.4 milliGRAM(s) Oral at bedtime    MEDICATIONS  (PRN):  acetaminophen     Tablet .. 650 milliGRAM(s) Oral every 6 hours PRN Temp greater or equal to 38C (100.4F), Mild Pain (1 - 3)  aluminum hydroxide/magnesium hydroxide/simethicone Suspension 30 milliLiter(s) Oral every 4 hours PRN Dyspepsia  melatonin 3 milliGRAM(s) Oral at bedtime PRN Insomnia  ondansetron Injectable 4 milliGRAM(s) IV Push every 8 hours PRN Nausea and/or Vomiting      Allergies    No Known Allergies    Intolerances        SOCIAL HISTORY:    FAMILY HISTORY:  No pertinent family history in first degree relatives        Vital Signs Last 24 Hrs  T(C): 36.7 (03 May 2022 09:03), Max: 36.8 (02 May 2022 23:22)  T(F): 98.1 (03 May 2022 09:03), Max: 98.2 (02 May 2022 23:22)  HR: 60 (03 May 2022 09:03) (55 - 61)  BP: 168/65 (03 May 2022 09:03) (129/72 - 168/65)  BP(mean): --  RR: 17 (03 May 2022 09:03) (16 - 17)  SpO2: 94% (03 May 2022 09:03) (93% - 94%)    PHYSICAL EXAM:    General: confused, yelling, aggresive  VITALS  T(C): 36.7 (05-03-22 @ 09:03), Max: 36.8 (05-02-22 @ 23:22)  HR: 60 (05-03-22 @ 09:03) (55 - 61)  BP: 168/65 (05-03-22 @ 09:03) (129/72 - 168/65)  RR: 17 (05-03-22 @ 09:03) (16 - 17)  SpO2: 94% (05-03-22 @ 09:03) (93% - 94%)            Skin     : No jaundice   HEENT: Normocephalic, no icterus , EOM full , No epistaxis  Lung    : No resp distress  Abdo:   : Soft, Non tender, No guarding, No distension, SPT in place draining yellow urine  Genitalia Male: No Romero _             LABS:                        8.6    7.05  )-----------( 165      ( 03 May 2022 07:18 )             26.8     05-03    141  |  113<H>  |  37<H>  ----------------------------<  98  4.6   |  21<L>  |  2.36<H>    Ca    9.0      03 May 2022 07:18

## 2022-05-03 NOTE — PROGRESS NOTE ADULT - ASSESSMENT
87 yr old M with PMHx of HTN, BPH, early dementia, anxiety, SP catheter, was brought to  ED on 4/20/22 by wife for increasing weakness for the past several days. I spoke to the pt's wife, who reported that lately he's been having difficulty getting up from a sitting position, and she's having a hard time helping him. When he does get up, he 'shuffles' and takes small steps. This has been worsening to the point where she can no longer get him up by her self. At baseline, he has dementia and difficulty walking. He does not have a neurologist.     #Dementia, possibly superimposed with acute encephalopathy d/t complicated UTI    #Early Parkinson's Dz, pt has several cardinal features, and wife's story corroborates clinical picture    -Continue ceftriaxone for UTI, ID recs appreciated  -Correct any underlying metabolic abnormalities  -Continue low dose Sinemet 25/100 QD, tomorrow can titrate to 1 tab BID x 2-3 days, then 1 tab TID  -Speech and swallow eval  -PT eval  -DVT prophylaxis  -Telemonitoring for bradycardia     87 yr old M with PMHx of HTN, BPH, early dementia, anxiety, SP catheter, was brought to  ED on 4/20/22 by wife for increasing weakness for the past several days. I spoke to the pt's wife, who reported that lately he's been having difficulty getting up from a sitting position, and she's having a hard time helping him. When he does get up, he 'shuffles' and takes small steps. This has been worsening to the point where she can no longer get him up by her self. At baseline, he has dementia and difficulty walking. He does not have a neurologist.     #Dementia, possibly superimposed with acute encephalopathy d/t complicated UTI    #Possible Early Parkinson's Dz, pt has several cardinal features, and wife's story corroborates clinical picture    -Continue ceftriaxone for UTI, ID recs appreciated  -Correct any underlying metabolic abnormalities  -Continue low dose Sinemet 25/100 QD, tomorrow can titrate to 1 tab BID x 2-3 days, then 1 tab TID  -Speech and swallow eval  -PT eval  -DVT prophylaxis  -Telemonitoring for bradycardia

## 2022-05-04 ENCOUNTER — TRANSCRIPTION ENCOUNTER (OUTPATIENT)
Age: 87
End: 2022-05-04

## 2022-05-04 VITALS
SYSTOLIC BLOOD PRESSURE: 151 MMHG | DIASTOLIC BLOOD PRESSURE: 75 MMHG | RESPIRATION RATE: 18 BRPM | OXYGEN SATURATION: 100 % | TEMPERATURE: 98 F | HEART RATE: 55 BPM

## 2022-05-04 LAB
ANION GAP SERPL CALC-SCNC: 7 MMOL/L — SIGNIFICANT CHANGE UP (ref 5–17)
BUN SERPL-MCNC: 33 MG/DL — HIGH (ref 7–23)
CALCIUM SERPL-MCNC: 9.4 MG/DL — SIGNIFICANT CHANGE UP (ref 8.5–10.1)
CHLORIDE SERPL-SCNC: 111 MMOL/L — HIGH (ref 96–108)
CO2 SERPL-SCNC: 22 MMOL/L — SIGNIFICANT CHANGE UP (ref 22–31)
CREAT SERPL-MCNC: 2.33 MG/DL — HIGH (ref 0.5–1.3)
EGFR: 27 ML/MIN/1.73M2 — LOW
GLUCOSE SERPL-MCNC: 85 MG/DL — SIGNIFICANT CHANGE UP (ref 70–99)
HCT VFR BLD CALC: 31 % — LOW (ref 39–50)
HGB BLD-MCNC: 9.6 G/DL — LOW (ref 13–17)
MCHC RBC-ENTMCNC: 28.6 PG — SIGNIFICANT CHANGE UP (ref 27–34)
MCHC RBC-ENTMCNC: 31 GM/DL — LOW (ref 32–36)
MCV RBC AUTO: 92.3 FL — SIGNIFICANT CHANGE UP (ref 80–100)
PLATELET # BLD AUTO: 158 K/UL — SIGNIFICANT CHANGE UP (ref 150–400)
POTASSIUM SERPL-MCNC: 4.5 MMOL/L — SIGNIFICANT CHANGE UP (ref 3.5–5.3)
POTASSIUM SERPL-SCNC: 4.5 MMOL/L — SIGNIFICANT CHANGE UP (ref 3.5–5.3)
RBC # BLD: 3.36 M/UL — LOW (ref 4.2–5.8)
RBC # FLD: 14.2 % — SIGNIFICANT CHANGE UP (ref 10.3–14.5)
SODIUM SERPL-SCNC: 140 MMOL/L — SIGNIFICANT CHANGE UP (ref 135–145)
WBC # BLD: 5.98 K/UL — SIGNIFICANT CHANGE UP (ref 3.8–10.5)
WBC # FLD AUTO: 5.98 K/UL — SIGNIFICANT CHANGE UP (ref 3.8–10.5)

## 2022-05-04 PROCEDURE — 99232 SBSQ HOSP IP/OBS MODERATE 35: CPT

## 2022-05-04 PROCEDURE — 99239 HOSP IP/OBS DSCHRG MGMT >30: CPT

## 2022-05-04 RX ORDER — NICOTINE POLACRILEX 2 MG
1 GUM BUCCAL
Qty: 30 | Refills: 0
Start: 2022-05-04 | End: 2022-06-02

## 2022-05-04 RX ORDER — JNJ-78436735 50000000000 [PFU]/.5ML
0.5 SUSPENSION INTRAMUSCULAR
Qty: 0 | Refills: 0 | DISCHARGE

## 2022-05-04 RX ORDER — CARBIDOPA AND LEVODOPA 25; 100 MG/1; MG/1
1 TABLET ORAL
Qty: 94 | Refills: 0
Start: 2022-05-04 | End: 2022-06-04

## 2022-05-04 RX ORDER — METOPROLOL TARTRATE 50 MG
3 TABLET ORAL
Qty: 90 | Refills: 0
Start: 2022-05-04 | End: 2022-06-02

## 2022-05-04 RX ORDER — METOPROLOL TARTRATE 50 MG
1 TABLET ORAL
Qty: 0 | Refills: 0 | DISCHARGE

## 2022-05-04 RX ORDER — CARBIDOPA AND LEVODOPA 25; 100 MG/1; MG/1
1 TABLET ORAL
Qty: 0 | Refills: 0 | DISCHARGE
Start: 2022-05-04

## 2022-05-04 RX ORDER — LOSARTAN POTASSIUM 100 MG/1
1 TABLET, FILM COATED ORAL
Qty: 0 | Refills: 0 | DISCHARGE

## 2022-05-04 RX ORDER — CEFUROXIME AXETIL 250 MG
1 TABLET ORAL
Qty: 20 | Refills: 0
Start: 2022-05-04 | End: 2022-05-13

## 2022-05-04 RX ORDER — FOLIC ACID 0.8 MG
1 TABLET ORAL
Qty: 30 | Refills: 0
Start: 2022-05-04 | End: 2022-06-02

## 2022-05-04 RX ORDER — FOLIC ACID 0.8 MG
1 TABLET ORAL
Qty: 0 | Refills: 0 | DISCHARGE
Start: 2022-05-04

## 2022-05-04 RX ORDER — NICOTINE POLACRILEX 2 MG
1 GUM BUCCAL
Qty: 0 | Refills: 0 | DISCHARGE
Start: 2022-05-04

## 2022-05-04 RX ORDER — METOPROLOL TARTRATE 50 MG
3 TABLET ORAL
Qty: 0 | Refills: 0 | DISCHARGE
Start: 2022-05-04

## 2022-05-04 RX ADMIN — Medication 1 PATCH: at 11:21

## 2022-05-04 RX ADMIN — Medication 1 MILLIGRAM(S): at 11:19

## 2022-05-04 RX ADMIN — Medication 1 PATCH: at 11:43

## 2022-05-04 RX ADMIN — CEFTRIAXONE 100 MILLIGRAM(S): 500 INJECTION, POWDER, FOR SOLUTION INTRAMUSCULAR; INTRAVENOUS at 16:37

## 2022-05-04 RX ADMIN — CARBIDOPA AND LEVODOPA 1 TABLET(S): 25; 100 TABLET ORAL at 11:20

## 2022-05-04 RX ADMIN — Medication 75 MILLIGRAM(S): at 11:20

## 2022-05-04 RX ADMIN — Medication 75 MILLIGRAM(S): at 11:19

## 2022-05-04 RX ADMIN — HEPARIN SODIUM 5000 UNIT(S): 5000 INJECTION INTRAVENOUS; SUBCUTANEOUS at 05:48

## 2022-05-04 NOTE — PROGRESS NOTE ADULT - ASSESSMENT
87 yr old M with PMHx of HTN, BPH, early dementia, anxiety, SP catheter, was brought to  ED on 4/20/22 by wife for increasing weakness for the past several days. I spoke to the pt's wife, who reported that lately he's been having difficulty getting up from a sitting position, and she's having a hard time helping him. When he does get up, he 'shuffles' and takes small steps. This has been worsening to the point where she can no longer get him up by her self. At baseline, he has dementia and difficulty walking. He does not have a neurologist.     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 Discussed appropriate timing of doses with patient's wife.  If there does not seem to be a clinical benefit, the medication can be weaned/discontinued.  -Daily activity      Patient is moving to [place] in the near future and should arrange for f/u with neurology.

## 2022-05-04 NOTE — DISCHARGE NOTE PROVIDER - NSDCMRMEDTOKEN_GEN_ALL_CORE_FT
carbidopa-levodopa 25 mg-100 mg oral tablet: 1 tab(s) orally 2 times a day for 2 days then 1 tab orally 3 times a day  cefuroxime 500 mg oral tablet: 1 tab(s) orally 2 times a day   folic acid 1 mg oral tablet: 1 tab(s) orally once a day  metoprolol succinate 25 mg oral tablet, extended release: 3 tab(s) orally once a day  nicotine 21 mg/24 hr transdermal film, extended release: 1 patch transdermal once a day  pregabalin 75 mg oral capsule: 1 cap(s) orally 2 times a day  tamsulosin 0.4 mg oral capsule: 1 cap(s) orally once a day (at bedtime)

## 2022-05-04 NOTE — DISCHARGE NOTE PROVIDER - CARE PROVIDERS DIRECT ADDRESSES
,DirectAddress_Unknown,[email or web address]

## 2022-05-04 NOTE — DISCHARGE NOTE PROVIDER - CARE PROVIDER_API CALL
Carlos Miller  [facility]  [address] [place] [address]  Phone: [phone]  Fax: [phone]  Follow Up Time:     Antony Georges)  Urology  [address] [place] [address]  Phone: [phone]  Fax: [phone]  Follow Up Time:     Lois Taylor)  Clinical Neurophysiology; EEGEpilepsy; Neurology; Sleep Medicine  [address], [address]  [place] [address]  Phone: [phone]  Fax: [phone]  Follow Up Time:

## 2022-05-04 NOTE — SWALLOW BEDSIDE ASSESSMENT ADULT - COMMENTS
87 M hx HTN on Metoprolol, BPH, early dementia anxiety, SP catheter brought in by wife as pt's wife states that pt has been weak for the last 2 days and she has been unable to get him up.  per wife also states that pt has had more trouble talking, but states that this is acute on chronic. no reported fever, vomiting, diarrhea. wife also reports pt has suprapubic catheter. no hx of heart attack, stroke. no hx of parkinson's. smoker. Wife at bedside, state change out was 3 weeks ago. Since then urine dark with sediment and strong smelling.   pt is seen by Service for oral-pharyngeal swallow function.

## 2022-05-04 NOTE — SWALLOW BEDSIDE ASSESSMENT ADULT - ORAL PREPARATORY PHASE
Mild manual tremor when transporting cup to lip: lip seal on cup when drinking tea, and on straw for his meds./Within functional limits

## 2022-05-04 NOTE — SWALLOW BEDSIDE ASSESSMENT ADULT - SLP GENERAL OBSERVATIONS
pt seated in bed on encounter, skewed over to right, immediately verbally combative, cursing and abusive. Not answering questions, and appearing dismissive of clinician. Fleeting eye contact. Appears to have rigid body posture though able to shift position as the HoB was elevated. Easily roused to irritation without apparent cause.  Fortunately, wife arrived and was able to give pt his pill medication singly and via straw.

## 2022-05-04 NOTE — PROGRESS NOTE ADULT - SUBJECTIVE AND OBJECTIVE BOX
Date of service: 22 @ 11:08    Lying in bed in NAD  Has urinary frequency  No fever    ROS: no fever or chills; denies dizziness, no HA, no SOB or cough, no abdominal pain, no diarrhea or constipation; no legs pain, no rashes    MEDICATIONS  (STANDING):  carbidopa/levodopa  25/100 1 Tablet(s) Oral daily  cefTRIAXone   IVPB 1000 milliGRAM(s) IV Intermittent every 24 hours  folic acid 1 milliGRAM(s) Oral daily  heparin   Injectable 5000 Unit(s) SubCutaneous every 8 hours  metoprolol succinate ER 75 milliGRAM(s) Oral daily  nicotine - 21 mG/24Hr(s) Patch 1 patch Transdermal daily  pregabalin 75 milliGRAM(s) Oral two times a day  sodium chloride 0.9%. 1000 milliLiter(s) (50 mL/Hr) IV Continuous <Continuous>  tamsulosin 0.4 milliGRAM(s) Oral at bedtime    Vital Signs Last 24 Hrs  T(C): 36.6 (04 May 2022 08:20), Max: 36.6 (04 May 2022 08:20)  T(F): 97.9 (04 May 2022 08:20), Max: 97.9 (04 May 2022 08:20)  HR: 57 (04 May 2022 08:20) (57 - 60)  BP: 160/64 (04 May 2022 08:20) (139/77 - 160/64)  BP(mean): --  RR: 18 (04 May 2022 08:20) (18 - 18)  SpO2: 94% (04 May 2022 08:20) (94% - 94%)     Physical exam:    Constitutional:  No acute distress  HEENT: NC/AT, EOMI, PERRLA, conjunctivae clear; ears and nose atraumatic  Neck: supple; thyroid not palpable  Back: no tenderness  Respiratory: respiratory effort normal; clear to auscultation  Cardiovascular: S1S2 regular, no murmurs  Abdomen: soft, not tender, not distended, positive BS  Genitourinary: no suprapubic tenderness  suprapubic catheter  Lymphatic: no LN palpable  Musculoskeletal: no muscle tenderness, no joint swelling or tenderness  Extremities: no pedal edema  Neurological/ Psychiatric: AxOx3, moving all extremities  Skin: no rashes; no palpable lesions    Labs: reviewed                        9.6    5.98  )-----------( 158      ( 04 May 2022 07:13 )             31.0     05-04    140  |  111<H>  |  33<H>  ----------------------------<  85  4.5   |  22  |  2.33<H>    Ca    9.4      04 May 2022 07:13                        9.1    6.52  )-----------( 156      ( 02 May 2022 08:14 )             29.2     05-02    140  |  112<H>  |  36<H>  ----------------------------<  105<H>  4.4   |  25  |  2.64<H>    Ca    8.9      02 May 2022 08:14    Urinalysis Basic - ( 2022 17:30 )    Color: Yellow / Appearance: very cloudy / S.010 / pH: x  Gluc: x / Ketone: Negative  / Bili: Negative / Urobili: Negative   Blood: x / Protein: 30 mg/dL / Nitrite: Positive   Leuk Esterase: Moderate / RBC: 3-5 /HPF / WBC 26-50   Sq Epi: x / Non Sq Epi: Negative / Bacteria: TNTC    COVID-19 PCR: NotDetec (22 @ 10:00)    Culture - Blood (collected 2022 13:14)  Source: .Blood None  Preliminary Report (01 May 2022 19:01):    No growth to date.    Culture - Blood (collected 2022 10:50)  Source: .Blood Blood-Peripheral  Preliminary Report (01 May 2022 17:01):    No growth to date.    Radiology: all available radiological tests reviewed    < from: US Kidney and Bladder (22 @ 17:52) >  Atrophic kidneys. No hydronephrosis.  Small left renal cyst.  Nonvisualization of the urinary bladder.  < end of copied text >    Advanced directives addressed: full resuscitation

## 2022-05-04 NOTE — SWALLOW BEDSIDE ASSESSMENT ADULT - SWALLOW EVAL: DIAGNOSIS
1. oral-pharyngeal swallow skills WFL, for at least thin liquids via straw. See below.  2. No overt evidence of primary linguistic pathology, or of motor speech deficits:

## 2022-05-04 NOTE — SWALLOW BEDSIDE ASSESSMENT ADULT - CONSISTENCIES ADMINISTERED
refused water decisively, agreed to drink some tea..  refused other consistencies, also decisively./thin liquid

## 2022-05-04 NOTE — PROGRESS NOTE ADULT - SUBJECTIVE AND OBJECTIVE BOX
Interval History:  5/4/22: No new events    MEDICATIONS  (STANDING):  carbidopa/levodopa  25/100 1 Tablet(s) Oral daily  cefTRIAXone   IVPB 1000 milliGRAM(s) IV Intermittent every 24 hours  folic acid 1 milliGRAM(s) Oral daily  heparin   Injectable 5000 Unit(s) SubCutaneous every 8 hours  metoprolol succinate ER 75 milliGRAM(s) Oral daily  nicotine - 21 mG/24Hr(s) Patch 1 patch Transdermal daily  pregabalin 75 milliGRAM(s) Oral two times a day  sodium chloride 0.9%. 1000 milliLiter(s) (50 mL/Hr) IV Continuous <Continuous>  tamsulosin 0.4 milliGRAM(s) Oral at bedtime    MEDICATIONS  (PRN):  acetaminophen     Tablet .. 650 milliGRAM(s) Oral every 6 hours PRN Temp greater or equal to 38C (100.4F), Mild Pain (1 - 3)  aluminum hydroxide/magnesium hydroxide/simethicone Suspension 30 milliLiter(s) Oral every 4 hours PRN Dyspepsia  melatonin 3 milliGRAM(s) Oral at bedtime PRN Insomnia  ondansetron Injectable 4 milliGRAM(s) IV Push every 8 hours PRN Nausea and/or Vomiting      Allergies    No Known Allergies    Intolerances        PHYSICAL EXAM:  Vital Signs Last 24 Hrs  T(F): 97.9 (05-04-22 @ 08:20)  HR: 57 (05-04-22 @ 08:20)  BP: 160/64 (05-04-22 @ 08:20)  RR: 18 (05-04-22 @ 08:20)    GENERAL: NAD, well-groomed  HEAD:  Atraumatic, Normocephalic  Neuro:  Awake, alert  CN: EOMI, slight facial asymmetry - sags on right but all muscles activate symmetrically  motor: mild increased tone, no focal weakness  sensory: intact to light touch  coordination: uncooperative      LABS:                        9.6    5.98  )-----------( 158      ( 04 May 2022 07:13 )             31.0     05-04    140  |  111<H>  |  33<H>  ----------------------------<  85  4.5   |  22  |  2.33<H>    Ca    9.4      04 May 2022 07:13            RADIOLOGY & ADDITIONAL STUDIES:  CT Head No Cont 5/1/22:  No CT evidence of acute intracranial pathology.  Follow-up MRI may be obtained for more sensitive evaluation.

## 2022-05-04 NOTE — DISCHARGE NOTE PROVIDER - DETAILS OF MALNUTRITION DIAGNOSIS/DIAGNOSES
This patient has been assessed with a concern for Malnutrition and was treated during this hospitalization for the following Nutrition diagnosis/diagnoses:     -  05/01/2022: Severe protein-calorie malnutrition

## 2022-05-04 NOTE — CHART NOTE - NSCHARTNOTEFT_GEN_A_CORE
This patient has a mobility limitation that significantly impairs the patients ability to participate in one or more MRADL's such as: toileting, eating, dressing and bathing in customary locations in the home.  Patient's home provides adequate access between rooms for the use of the manual wheelchair.  The wheelchair will significantly improve patient's ability to participate in MRADL's and will be used on a regular basis in the home. The patient's mobility limitation cannot be resolved by the use of a walker or cane.  This patient does not have the upper extremity function to safely propel the manual wheelchair.  Patient has a 24/7 care giver in the home who is willing to propel the wheelchair at any given time. The patient has agreed to use the wheelchair on a daily basis in the home. Diagnosis: Parkinson's

## 2022-05-04 NOTE — SWALLOW BEDSIDE ASSESSMENT ADULT - SWALLOW EVAL: RECOMMENDED FEEDING/EATING TECHNIQUES
pt will need assistance/allow for swallow between intakes/alternate food with liquid/check mouth frequently for oral residue/pocketing/maintain upright posture during/after eating for 30 mins/position upright (90 degrees)/small sips/bites

## 2022-05-04 NOTE — SWALLOW BEDSIDE ASSESSMENT ADULT - SWALLOW EVAL: CRITERIA FOR SKILLED INTERVENTION MET
will follow peripherally for tolerance./current level of function same as previous level of function

## 2022-05-04 NOTE — PROGRESS NOTE ADULT - PROVIDER SPECIALTY LIST ADULT
Cardiology
Hospitalist
Infectious Disease
Urology
Hospitalist
Hospitalist
Infectious Disease
Neurology
Neurology

## 2022-05-04 NOTE — DISCHARGE NOTE PROVIDER - HOSPITAL COURSE
: 87 M hx HTN on Metoprolol, BPH, early dementia anxiety, SP catheter brought in by wife  as Pt has been weak for the last 2 days and she has been unable to get him up.  PTs wife also states that pt has had more trouble talking, but states that this is acute on chronic, no reported fever, vomiting, diarrhea.  Wife at bedside, states cath was  changed  about  3 weeks ago. Since then urine dark with sediment and strong smelling.   ED course: CXR wet read-> clear. UA pending however has SP catheter. Lactate normal .WBC 19. Scr 3.9. s/p ceftriaxone. LFTs nl. COVID negative. No e/o hypoxia and no e/o hypotension.     # Pyuria/ UTI due to the presence of suprapubic catheter  # BPH/Urinary retention s/p suprapubic tube.  -started on ceftriaxone on admission  -encephalopathy is improved - mote alert; poorly verbal  -no clinical signs of sepsis  -urine in bag is clear at this time  -treated with IV Rocephin - will be discharged on ceftin 500 mg po bid for 10 more days - D/W Dr. Milian   -suprapubic urinary catheter changed by OLGA Singh -   20 cc sterile water in balloon    #Dementia, possibly superimposed with acute encephalopathy d/t complicated UTI due to the presence of urinary catheter   #Early Parkinson's Dz,  - UTI discharge treatment as above   -Correct any underlying metabolic abnormalities  -Continue low dose Sinemet 25/100 QD, titrate to 1 tab BID x 2-3 days, then 1 tab TID - D/W Dr. Taylor   -Speech and swallow - continue regular diet   - Neuro consult appreciated     #Anemia - suspect anemia of chronic disease due to CKD  folic acid - low - supplemented   H/H improved, will monitor outpatient     #Bradycardia/Sinus pauses  C/w tele: 1st degree AV block, HR lowest in 30s,   resolved, no bradycardia on tele  Echo - preserved EF, no acute findings   Cardiac consult - Dr. Garcia - appreciated     #ZELDA on CKD III  - baseline CR about 3.2 , trending down with IV hydration   - creatinine improved - trended down 2.33 on discharge on 5/4  - Losartan on hold   - Monitor UO  - Kidney sono with atrophic kidneys, no hydro   - outpatient follow up wit nephrology - pt. moving to [place] - d/w wife     # HTN   - was hypotensive on admission, resolved with IVF bolus  -continue to hold  losartan for now  - on decreased dose of  toprol Xl with parameters will readjust based on daily BP   - monitor    # Smoking   -  for smoking cessation  - nicotine patch     Pt. is stable for discharge, will follow up outpatient with neurology, cardiology, urology and PCP.     PHYSICAL EXAM:  Vital Signs Last 24 Hrs  T(C): 36.6 (04 May 2022 15:20), Max: 36.6 (04 May 2022 08:20)  T(F): 97.9 (04 May 2022 15:20), Max: 97.9 (04 May 2022 08:20)  HR: 55 (04 May 2022 15:20) (55 - 60)  BP: 151/75 (04 May 2022 15:20) (139/77 - 160/64)  BP(mean): --  RR: 18 (04 May 2022 15:20) (18 - 18)  SpO2: 100% (04 May 2022 15:20) (94% - 100%)  General: Well developed; malnourished, frail elderly male,  in no acute distress  Eyes: PERRL,  conjunctiva and sclera clear  Head: Normocephalic; atraumatic  ENMT: No nasal discharge; airway clear  Neck: Supple; non tender; no masses  Respiratory:  Decreased BS, No wheezes, rales or rhonchi  Cardiovascular: Regular rate and rhythm. S1 and S2 Normal   Gastrointestinal: Soft non-tender non-distended; Normal bowel sounds  Genitourinary: No  suprapubic  tenderness, suprapubic  cath in place   Extremities: No pitting  edema  Vascular: Peripheral pulses palpable 2+ bilaterally  Neurological: Alert and oriented x1-2, confused,  hypophonic voice, mild tremors   Skin: Warm and dry. No acute rash  Lymph Nodes: No acute cervical adenopathy  Musculoskeletal: No joint edema or erythema

## 2022-05-04 NOTE — DISCHARGE NOTE NURSING/CASE MANAGEMENT/SOCIAL WORK - NSDCPEFALRISK_GEN_ALL_CORE
For information on Fall & Injury Prevention, visit: [email or web address] OR  [email or web address] OR  https://www.cdc.gov/steadi/patient.html

## 2022-05-04 NOTE — DISCHARGE NOTE PROVIDER - NSDCCPCAREPLAN_GEN_ALL_CORE_FT
PRINCIPAL DISCHARGE DIAGNOSIS  Diagnosis: Acute UTI  Assessment and Plan of Treatment: you were diagnosed with infetion in your urine du to the presernce of urinary catheter   The urine cathter was changed and the baloon was inflated with 20-25 cc  you were treated with antibiotics through the vein  you will cotinue to take antibiotics - ceftin 500 mg orally twice a day for 10 more days   you will follow up with urology in 4 weeks with Dr. Shah  you will need to find an urologist in [place] if you relocate within one month      SECONDARY DISCHARGE DIAGNOSES  Diagnosis: Parkinsons  Assessment and Plan of Treatment: you were started on a new medication sinemet - you will take it as prescribed - twice a day for 2 days and then 3 times a day  you will follow up with neurology outpatient    Diagnosis: Smoking hx  Assessment and Plan of Treatment: you need to cut down and stop smoking  take nicotine patch as prescribed    Diagnosis: Stage 3 chronic kidney disease  Assessment and Plan of Treatment: tests done at the hospital show you have kidney disease   your kidney function improved while in the hospital  you need to follow up with idney specialist outpatient    Diagnosis: Anemia of chronic disease  Assessment and Plan of Treatment: your tests shows you are anemic  you were started on a supplement - folic acid  you need to check your blood work with your PCP in one week

## 2022-05-04 NOTE — DISCHARGE NOTE PROVIDER - PROVIDER TOKENS
PROVIDER:[TOKEN:[[id number]]],PROVIDER:[TOKEN:[[id number]]],PROVIDER:[TOKEN:[[id number]]]

## 2022-05-04 NOTE — PROGRESS NOTE ADULT - ASSESSMENT
88 y/o Male with h/o HTN on Metoprolol, BPH, early dementia, anxiety disorder, urinary retention s/p suprapubic catheter was admitted on 4/30 for increased weakness. Pt's wife states that pt has been weak for the last 2 days PTA and she has been unable to get him up. wife also stated that pt has had more trouble talking, but states that this is acute on chronic. no reported fever at home. The urine in bag is reported dark with sediment and strong smelling. In ER he received ceftriaxone.    1. Pyuria. Probable UTI. BPH. Urinary retention s/p suprapubic tube.  -alert  -encephalopathy is improved - poorly verbal  -no clinical signs of sepsis  -urine in bag is clear at this time  -on ceftriaxone 1 gm IV qd # 4  -tolerating abx well so far; no side effects noted  -clinically improved  -change abx to ceftin 500 mg PO q12h for 10 more days  -urology evaluation  -monitor temps  -f/u CBC  -supportive care  2. Other issues:   -care per medicine

## 2022-05-04 NOTE — DISCHARGE NOTE NURSING/CASE MANAGEMENT/SOCIAL WORK - PATIENT PORTAL LINK FT
You can access the FollowMyHealth Patient Portal offered by [facility] by registering at the following website: [email or web address]. By joining [employer]’s FollowMyHealth portal, you will also be able to view your health information using other applications (apps) compatible with our system.

## 2022-05-04 NOTE — PROGRESS NOTE ADULT - NUTRITIONAL ASSESSMENT
This patient has been assessed with a concern for Malnutrition and has been determined to have a diagnosis/diagnoses of Severe protein-calorie malnutrition.    This patient is being managed with:   Diet DASH/TLC-  Sodium & Cholesterol Restricted  Entered: Apr 30 2022  4:22PM    
This patient has been assessed with a concern for Malnutrition and has been determined to have a diagnosis/diagnoses of Severe protein-calorie malnutrition.    This patient is being managed with:   Diet DASH/TLC-  Sodium & Cholesterol Restricted  Entered: Apr 30 2022  4:22PM      This patient has been assessed with a concern for Malnutrition and has been determined to have a diagnosis/diagnoses of Severe protein-calorie malnutrition.    This patient is being managed with:   Diet DASH/TLC-  Sodium & Cholesterol Restricted  Entered: Apr 30 2022  4:22PM    
This patient has been assessed with a concern for Malnutrition and has been determined to have a diagnosis/diagnoses of Severe protein-calorie malnutrition.    This patient is being managed with:   Diet DASH/TLC-  Sodium & Cholesterol Restricted  Entered: Apr 30 2022  4:22PM    

## 2022-05-05 LAB
CULTURE RESULTS: SIGNIFICANT CHANGE UP
CULTURE RESULTS: SIGNIFICANT CHANGE UP
SPECIMEN SOURCE: SIGNIFICANT CHANGE UP
SPECIMEN SOURCE: SIGNIFICANT CHANGE UP

## 2022-05-10 DIAGNOSIS — N17.9 ACUTE KIDNEY FAILURE, UNSPECIFIED: ICD-10-CM

## 2022-05-10 DIAGNOSIS — N40.1 BENIGN PROSTATIC HYPERPLASIA WITH LOWER URINARY TRACT SYMPTOMS: ICD-10-CM

## 2022-05-10 DIAGNOSIS — F02.80 DEMENTIA IN OTHER DISEASES CLASSIFIED ELSEWHERE, UNSPECIFIED SEVERITY, WITHOUT BEHAVIORAL DISTURBANCE, PSYCHOTIC DISTURBANCE, MOOD DISTURBANCE, AND ANXIETY: ICD-10-CM

## 2022-05-10 DIAGNOSIS — G93.41 METABOLIC ENCEPHALOPATHY: ICD-10-CM

## 2022-05-10 DIAGNOSIS — R00.1 BRADYCARDIA, UNSPECIFIED: ICD-10-CM

## 2022-05-10 DIAGNOSIS — E43 UNSPECIFIED SEVERE PROTEIN-CALORIE MALNUTRITION: ICD-10-CM

## 2022-05-10 DIAGNOSIS — I12.9 HYPERTENSIVE CHRONIC KIDNEY DISEASE WITH STAGE 1 THROUGH STAGE 4 CHRONIC KIDNEY DISEASE, OR UNSPECIFIED CHRONIC KIDNEY DISEASE: ICD-10-CM

## 2022-05-10 DIAGNOSIS — N39.0 URINARY TRACT INFECTION, SITE NOT SPECIFIED: ICD-10-CM

## 2022-05-10 DIAGNOSIS — F41.9 ANXIETY DISORDER, UNSPECIFIED: ICD-10-CM

## 2022-05-10 DIAGNOSIS — G20 PARKINSON'S DISEASE: ICD-10-CM

## 2022-05-10 DIAGNOSIS — R33.8 OTHER RETENTION OF URINE: ICD-10-CM

## 2022-05-10 DIAGNOSIS — D63.1 ANEMIA IN CHRONIC KIDNEY DISEASE: ICD-10-CM

## 2022-05-10 DIAGNOSIS — F17.210 NICOTINE DEPENDENCE, CIGARETTES, UNCOMPLICATED: ICD-10-CM

## 2022-05-10 DIAGNOSIS — N18.30 CHRONIC KIDNEY DISEASE, STAGE 3 UNSPECIFIED: ICD-10-CM

## 2022-05-10 DIAGNOSIS — T83.518A INFECTION AND INFLAMMATORY REACTION DUE TO OTHER URINARY CATHETER, INITIAL ENCOUNTER: ICD-10-CM

## 2022-05-10 DIAGNOSIS — I44.0 ATRIOVENTRICULAR BLOCK, FIRST DEGREE: ICD-10-CM

## 2022-05-17 ENCOUNTER — EMERGENCY (EMERGENCY)
Facility: HOSPITAL | Age: 87
LOS: 0 days | Discharge: HOME | End: 2022-05-17
Attending: EMERGENCY MEDICINE
Payer: MEDICARE

## 2022-05-17 VITALS — WEIGHT: 160.06 LBS | HEIGHT: 72 IN

## 2022-05-17 VITALS
OXYGEN SATURATION: 95 % | TEMPERATURE: 98 F | RESPIRATION RATE: 17 BRPM | SYSTOLIC BLOOD PRESSURE: 133 MMHG | HEART RATE: 55 BPM | DIASTOLIC BLOOD PRESSURE: 66 MMHG

## 2022-05-17 DIAGNOSIS — N40.0 BENIGN PROSTATIC HYPERPLASIA WITHOUT LOWER URINARY TRACT SYMPTOMS: ICD-10-CM

## 2022-05-17 DIAGNOSIS — R55 SYNCOPE AND COLLAPSE: ICD-10-CM

## 2022-05-17 DIAGNOSIS — Z93.59 OTHER CYSTOSTOMY STATUS: Chronic | ICD-10-CM

## 2022-05-17 DIAGNOSIS — Z20.822 CONTACT WITH AND (SUSPECTED) EXPOSURE TO COVID-19: ICD-10-CM

## 2022-05-17 DIAGNOSIS — F41.9 ANXIETY DISORDER, UNSPECIFIED: ICD-10-CM

## 2022-05-17 DIAGNOSIS — C4A.31 MERKEL CELL CARCINOMA OF NOSE: Chronic | ICD-10-CM

## 2022-05-17 DIAGNOSIS — N39.0 URINARY TRACT INFECTION, SITE NOT SPECIFIED: ICD-10-CM

## 2022-05-17 DIAGNOSIS — I10 ESSENTIAL (PRIMARY) HYPERTENSION: ICD-10-CM

## 2022-05-17 LAB
ALBUMIN SERPL ELPH-MCNC: 2.7 G/DL — LOW (ref 3.3–5)
ALP SERPL-CCNC: 88 U/L — SIGNIFICANT CHANGE UP (ref 40–120)
ALT FLD-CCNC: <6 U/L — LOW (ref 12–78)
ANION GAP SERPL CALC-SCNC: 5 MMOL/L — SIGNIFICANT CHANGE UP (ref 5–17)
APPEARANCE UR: ABNORMAL
APTT BLD: 31.3 SEC — SIGNIFICANT CHANGE UP (ref 27.5–35.5)
AST SERPL-CCNC: 20 U/L — SIGNIFICANT CHANGE UP (ref 15–37)
BASOPHILS # BLD AUTO: 0.03 K/UL — SIGNIFICANT CHANGE UP (ref 0–0.2)
BASOPHILS NFR BLD AUTO: 0.4 % — SIGNIFICANT CHANGE UP (ref 0–2)
BILIRUB SERPL-MCNC: 0.4 MG/DL — SIGNIFICANT CHANGE UP (ref 0.2–1.2)
BILIRUB UR-MCNC: SIGNIFICANT CHANGE UP
BUN SERPL-MCNC: 35 MG/DL — HIGH (ref 7–23)
CALCIUM SERPL-MCNC: 9.2 MG/DL — SIGNIFICANT CHANGE UP (ref 8.5–10.1)
CHLORIDE SERPL-SCNC: 109 MMOL/L — HIGH (ref 96–108)
CO2 SERPL-SCNC: 26 MMOL/L — SIGNIFICANT CHANGE UP (ref 22–31)
COLOR SPEC: ABNORMAL
CREAT SERPL-MCNC: 2.82 MG/DL — HIGH (ref 0.5–1.3)
DIFF PNL FLD: SIGNIFICANT CHANGE UP
EGFR: 21 ML/MIN/1.73M2 — LOW
EOSINOPHIL # BLD AUTO: 0.45 K/UL — SIGNIFICANT CHANGE UP (ref 0–0.5)
EOSINOPHIL NFR BLD AUTO: 6.4 % — HIGH (ref 0–6)
GLUCOSE SERPL-MCNC: 93 MG/DL — SIGNIFICANT CHANGE UP (ref 70–99)
GLUCOSE UR QL: SIGNIFICANT CHANGE UP
HCT VFR BLD CALC: 29.2 % — LOW (ref 39–50)
HGB BLD-MCNC: 9.2 G/DL — LOW (ref 13–17)
IMM GRANULOCYTES NFR BLD AUTO: 0.6 % — SIGNIFICANT CHANGE UP (ref 0–1.5)
INR BLD: 1.22 RATIO — HIGH (ref 0.88–1.16)
KETONES UR-MCNC: SIGNIFICANT CHANGE UP
LEUKOCYTE ESTERASE UR-ACNC: SIGNIFICANT CHANGE UP
LYMPHOCYTES # BLD AUTO: 2.05 K/UL — SIGNIFICANT CHANGE UP (ref 1–3.3)
LYMPHOCYTES # BLD AUTO: 29.2 % — SIGNIFICANT CHANGE UP (ref 13–44)
MCHC RBC-ENTMCNC: 28.8 PG — SIGNIFICANT CHANGE UP (ref 27–34)
MCHC RBC-ENTMCNC: 31.5 GM/DL — LOW (ref 32–36)
MCV RBC AUTO: 91.5 FL — SIGNIFICANT CHANGE UP (ref 80–100)
MONOCYTES # BLD AUTO: 0.55 K/UL — SIGNIFICANT CHANGE UP (ref 0–0.9)
MONOCYTES NFR BLD AUTO: 7.8 % — SIGNIFICANT CHANGE UP (ref 2–14)
NEUTROPHILS # BLD AUTO: 3.9 K/UL — SIGNIFICANT CHANGE UP (ref 1.8–7.4)
NEUTROPHILS NFR BLD AUTO: 55.6 % — SIGNIFICANT CHANGE UP (ref 43–77)
NITRITE UR-MCNC: SIGNIFICANT CHANGE UP
PH UR: SIGNIFICANT CHANGE UP (ref 5–8)
PLATELET # BLD AUTO: 141 K/UL — LOW (ref 150–400)
POTASSIUM SERPL-MCNC: 4.7 MMOL/L — SIGNIFICANT CHANGE UP (ref 3.5–5.3)
POTASSIUM SERPL-SCNC: 4.7 MMOL/L — SIGNIFICANT CHANGE UP (ref 3.5–5.3)
PROT SERPL-MCNC: 6.9 GM/DL — SIGNIFICANT CHANGE UP (ref 6–8.3)
PROT UR-MCNC: SIGNIFICANT CHANGE UP
PROTHROM AB SERPL-ACNC: 14.2 SEC — HIGH (ref 10.5–13.4)
RBC # BLD: 3.19 M/UL — LOW (ref 4.2–5.8)
RBC # FLD: 15.6 % — HIGH (ref 10.3–14.5)
SODIUM SERPL-SCNC: 140 MMOL/L — SIGNIFICANT CHANGE UP (ref 135–145)
SP GR SPEC: SIGNIFICANT CHANGE UP (ref 1.01–1.02)
UROBILINOGEN FLD QL: SIGNIFICANT CHANGE UP
WBC # BLD: 7.02 K/UL — SIGNIFICANT CHANGE UP (ref 3.8–10.5)
WBC # FLD AUTO: 7.02 K/UL — SIGNIFICANT CHANGE UP (ref 3.8–10.5)

## 2022-05-17 PROCEDURE — 93010 ELECTROCARDIOGRAM REPORT: CPT

## 2022-05-17 PROCEDURE — U0005: CPT

## 2022-05-17 PROCEDURE — 36415 COLL VENOUS BLD VENIPUNCTURE: CPT

## 2022-05-17 PROCEDURE — 99284 EMERGENCY DEPT VISIT MOD MDM: CPT

## 2022-05-17 PROCEDURE — 85025 COMPLETE CBC W/AUTO DIFF WBC: CPT

## 2022-05-17 PROCEDURE — 81001 URINALYSIS AUTO W/SCOPE: CPT

## 2022-05-17 PROCEDURE — U0003: CPT

## 2022-05-17 PROCEDURE — 93005 ELECTROCARDIOGRAM TRACING: CPT

## 2022-05-17 PROCEDURE — 80053 COMPREHEN METABOLIC PANEL: CPT

## 2022-05-17 PROCEDURE — 85730 THROMBOPLASTIN TIME PARTIAL: CPT

## 2022-05-17 PROCEDURE — 85610 PROTHROMBIN TIME: CPT

## 2022-05-17 PROCEDURE — 99283 EMERGENCY DEPT VISIT LOW MDM: CPT

## 2022-05-17 PROCEDURE — 87186 SC STD MICRODIL/AGAR DIL: CPT

## 2022-05-17 PROCEDURE — 87086 URINE CULTURE/COLONY COUNT: CPT

## 2022-05-17 RX ORDER — MOXIFLOXACIN HYDROCHLORIDE TABLETS, 400 MG 400 MG/1
1 TABLET, FILM COATED ORAL
Qty: 7 | Refills: 0
Start: 2022-05-17 | End: 2022-05-23

## 2022-05-17 RX ORDER — CIPROFLOXACIN LACTATE 400MG/40ML
500 VIAL (ML) INTRAVENOUS ONCE
Refills: 0 | Status: COMPLETED | OUTPATIENT
Start: 2022-05-17 | End: 2022-05-17

## 2022-05-17 RX ADMIN — Medication 500 MILLIGRAM(S): at 08:54

## 2022-05-17 NOTE — ED PROVIDER NOTE - OBJECTIVE STATEMENT
87 M hx HTN on Metoprolol, BPH, early dementia anxiety, suprapubic tube, recent admission for syncope + UTI, recently completed cefuroxime, presenting w/ blood in romero cath. hematuria since last night. no fevers or chills. no reported abdominal pain.

## 2022-05-17 NOTE — ED PROVIDER NOTE - NS ED ROS FT
Gen: No fever, normal appetite  Eyes: No eye irritation or discharge  ENT: No ear pain, congestion, sore throat  Resp: No cough or trouble breathing  Cardiovascular: No chest pain or palpitation  Gastroenteric: No nausea/vomiting, diarrhea, constipation  :  hematuria   MS: No joint or muscle pain  Skin: No rashes  Neuro: No headache; no abnormal movements  Remainder negative, except as per the HPI

## 2022-05-17 NOTE — ED PROVIDER NOTE - NSFOLLOWUPINSTRUCTIONS_ED_ALL_ED_FT
Please follow up with your urologist. Please take cipro once daily as prescribed. return to ED for any concerning symptoms.

## 2022-05-17 NOTE — ED ADULT NURSE NOTE - INTERVENTIONS DEFINITIONS
Non-slip footwear when patient is off stretcher/Physically safe environment: no spills, clutter or unnecessary equipment/Provide visual cue, wrist band, yellow gown, etc.

## 2022-05-17 NOTE — ED PROVIDER NOTE - CLINICAL SUMMARY MEDICAL DECISION MAKING FREE TEXT BOX
pt w/ recent UTI presenting w/ hematuria, will change suprapubic tube, will change abx to cipro, urology f/u.

## 2022-05-17 NOTE — ED PROVIDER NOTE - PATIENT PORTAL LINK FT
You can access the FollowMyHealth Patient Portal offered by [facility] by registering at the following website: [email or web address]. By joining [employer]’s FollowMyHealth portal, you will also be able to view your health information using other applications (apps) compatible with our system.

## 2022-05-17 NOTE — ED PROVIDER NOTE - PHYSICAL EXAMINATION
GEN - NAD; well appearing; A+O x2   HEAD - NC/AT     EYES - EOMI, no conjunctival pallor, no scleral icterus  ENT -   mucous membranes  moist , no discharge      NECK - Neck supple  PULM - CTA b/l,  symmetric breath sounds  COR -  RRR, S1 S2, no murmurs  ABD - + suprapubic tube, + wine colored urine.   BACK - no CVA tenderness, nontender spine     EXTREMS - no edema, no deformity, warm and well perfused    SKIN - no rash or bruising      NEUROLOGIC - alert, sensation nl, motor 5/5 RUE/LUE/RLE/LLE

## 2022-05-17 NOTE — ED ADULT TRIAGE NOTE - CHIEF COMPLAINT QUOTE
Pt BIB family with report of hematuria noted today. Pt has suprapubic catheter for approx two years. Denies any pain, f/c, or injury.

## 2022-05-17 NOTE — ED ADULT NURSE NOTE - OBJECTIVE STATEMENT
Pt comes to ED with c/o hematuria that started yesterday. Pt was in hospital last week after "collapsing" and was treated for UTI and finished his last dose of abx  yesterday. Pt denies any fevers, SOB.

## 2022-05-19 LAB
-  AMIKACIN: SIGNIFICANT CHANGE UP
-  AZTREONAM: SIGNIFICANT CHANGE UP
-  CEFEPIME: SIGNIFICANT CHANGE UP
-  CEFTAZIDIME: SIGNIFICANT CHANGE UP
-  CIPROFLOXACIN: SIGNIFICANT CHANGE UP
-  GENTAMICIN: SIGNIFICANT CHANGE UP
-  IMIPENEM: SIGNIFICANT CHANGE UP
-  LEVOFLOXACIN: SIGNIFICANT CHANGE UP
-  MEROPENEM: SIGNIFICANT CHANGE UP
-  PIPERACILLIN/TAZOBACTAM: SIGNIFICANT CHANGE UP
-  TOBRAMYCIN: SIGNIFICANT CHANGE UP
CULTURE RESULTS: SIGNIFICANT CHANGE UP
METHOD TYPE: SIGNIFICANT CHANGE UP
ORGANISM # SPEC MICROSCOPIC CNT: SIGNIFICANT CHANGE UP
ORGANISM # SPEC MICROSCOPIC CNT: SIGNIFICANT CHANGE UP
SPECIMEN SOURCE: SIGNIFICANT CHANGE UP

## 2022-05-21 ENCOUNTER — EMERGENCY (EMERGENCY)
Facility: HOSPITAL | Age: 87
LOS: 0 days | Discharge: HOME | End: 2022-05-21
Attending: EMERGENCY MEDICINE
Payer: MEDICARE

## 2022-05-21 VITALS — WEIGHT: 149.91 LBS | HEIGHT: 72 IN

## 2022-05-21 VITALS
HEART RATE: 74 BPM | DIASTOLIC BLOOD PRESSURE: 62 MMHG | OXYGEN SATURATION: 95 % | TEMPERATURE: 98 F | SYSTOLIC BLOOD PRESSURE: 115 MMHG | RESPIRATION RATE: 18 BRPM

## 2022-05-21 DIAGNOSIS — N39.0 URINARY TRACT INFECTION, SITE NOT SPECIFIED: ICD-10-CM

## 2022-05-21 DIAGNOSIS — N40.0 BENIGN PROSTATIC HYPERPLASIA WITHOUT LOWER URINARY TRACT SYMPTOMS: ICD-10-CM

## 2022-05-21 DIAGNOSIS — R31.9 HEMATURIA, UNSPECIFIED: ICD-10-CM

## 2022-05-21 DIAGNOSIS — C4A.31 MERKEL CELL CARCINOMA OF NOSE: Chronic | ICD-10-CM

## 2022-05-21 DIAGNOSIS — F17.210 NICOTINE DEPENDENCE, CIGARETTES, UNCOMPLICATED: ICD-10-CM

## 2022-05-21 DIAGNOSIS — F41.9 ANXIETY DISORDER, UNSPECIFIED: ICD-10-CM

## 2022-05-21 DIAGNOSIS — Z93.59 OTHER CYSTOSTOMY STATUS: Chronic | ICD-10-CM

## 2022-05-21 DIAGNOSIS — Z20.822 CONTACT WITH AND (SUSPECTED) EXPOSURE TO COVID-19: ICD-10-CM

## 2022-05-21 LAB
ALBUMIN SERPL ELPH-MCNC: 2.8 G/DL — LOW (ref 3.3–5)
ALP SERPL-CCNC: 94 U/L — SIGNIFICANT CHANGE UP (ref 40–120)
ALT FLD-CCNC: 10 U/L — LOW (ref 12–78)
ANION GAP SERPL CALC-SCNC: 4 MMOL/L — LOW (ref 5–17)
APPEARANCE UR: CLEAR — SIGNIFICANT CHANGE UP
AST SERPL-CCNC: 14 U/L — LOW (ref 15–37)
BASOPHILS # BLD AUTO: 0.01 K/UL — SIGNIFICANT CHANGE UP (ref 0–0.2)
BASOPHILS NFR BLD AUTO: 0.2 % — SIGNIFICANT CHANGE UP (ref 0–2)
BILIRUB SERPL-MCNC: 0.4 MG/DL — SIGNIFICANT CHANGE UP (ref 0.2–1.2)
BILIRUB UR-MCNC: NEGATIVE — SIGNIFICANT CHANGE UP
BUN SERPL-MCNC: 34 MG/DL — HIGH (ref 7–23)
CALCIUM SERPL-MCNC: 9.5 MG/DL — SIGNIFICANT CHANGE UP (ref 8.5–10.1)
CHLORIDE SERPL-SCNC: 107 MMOL/L — SIGNIFICANT CHANGE UP (ref 96–108)
CO2 SERPL-SCNC: 28 MMOL/L — SIGNIFICANT CHANGE UP (ref 22–31)
COLOR SPEC: YELLOW — SIGNIFICANT CHANGE UP
CREAT SERPL-MCNC: 2.65 MG/DL — HIGH (ref 0.5–1.3)
DIFF PNL FLD: ABNORMAL
EGFR: 23 ML/MIN/1.73M2 — LOW
EOSINOPHIL # BLD AUTO: 0.29 K/UL — SIGNIFICANT CHANGE UP (ref 0–0.5)
EOSINOPHIL NFR BLD AUTO: 5.9 % — SIGNIFICANT CHANGE UP (ref 0–6)
GLUCOSE SERPL-MCNC: 98 MG/DL — SIGNIFICANT CHANGE UP (ref 70–99)
GLUCOSE UR QL: NEGATIVE — SIGNIFICANT CHANGE UP
HCT VFR BLD CALC: 30.8 % — LOW (ref 39–50)
HGB BLD-MCNC: 9.7 G/DL — LOW (ref 13–17)
IMM GRANULOCYTES NFR BLD AUTO: 0.4 % — SIGNIFICANT CHANGE UP (ref 0–1.5)
KETONES UR-MCNC: NEGATIVE — SIGNIFICANT CHANGE UP
LEUKOCYTE ESTERASE UR-ACNC: ABNORMAL
LYMPHOCYTES # BLD AUTO: 1.6 K/UL — SIGNIFICANT CHANGE UP (ref 1–3.3)
LYMPHOCYTES # BLD AUTO: 32.5 % — SIGNIFICANT CHANGE UP (ref 13–44)
MCHC RBC-ENTMCNC: 29.4 PG — SIGNIFICANT CHANGE UP (ref 27–34)
MCHC RBC-ENTMCNC: 31.5 GM/DL — LOW (ref 32–36)
MCV RBC AUTO: 93.3 FL — SIGNIFICANT CHANGE UP (ref 80–100)
MONOCYTES # BLD AUTO: 0.4 K/UL — SIGNIFICANT CHANGE UP (ref 0–0.9)
MONOCYTES NFR BLD AUTO: 8.1 % — SIGNIFICANT CHANGE UP (ref 2–14)
NEUTROPHILS # BLD AUTO: 2.6 K/UL — SIGNIFICANT CHANGE UP (ref 1.8–7.4)
NEUTROPHILS NFR BLD AUTO: 52.9 % — SIGNIFICANT CHANGE UP (ref 43–77)
NITRITE UR-MCNC: NEGATIVE — SIGNIFICANT CHANGE UP
PH UR: 5 — SIGNIFICANT CHANGE UP (ref 5–8)
PLATELET # BLD AUTO: 129 K/UL — LOW (ref 150–400)
POTASSIUM SERPL-MCNC: 4.5 MMOL/L — SIGNIFICANT CHANGE UP (ref 3.5–5.3)
POTASSIUM SERPL-SCNC: 4.5 MMOL/L — SIGNIFICANT CHANGE UP (ref 3.5–5.3)
PROT SERPL-MCNC: 7.1 GM/DL — SIGNIFICANT CHANGE UP (ref 6–8.3)
PROT UR-MCNC: 30 MG/DL
RBC # BLD: 3.3 M/UL — LOW (ref 4.2–5.8)
RBC # FLD: 15.9 % — HIGH (ref 10.3–14.5)
SODIUM SERPL-SCNC: 139 MMOL/L — SIGNIFICANT CHANGE UP (ref 135–145)
SP GR SPEC: 1.01 — SIGNIFICANT CHANGE UP (ref 1.01–1.02)
UROBILINOGEN FLD QL: NEGATIVE — SIGNIFICANT CHANGE UP
WBC # BLD: 4.92 K/UL — SIGNIFICANT CHANGE UP (ref 3.8–10.5)
WBC # FLD AUTO: 4.92 K/UL — SIGNIFICANT CHANGE UP (ref 3.8–10.5)

## 2022-05-21 PROCEDURE — 81001 URINALYSIS AUTO W/SCOPE: CPT

## 2022-05-21 PROCEDURE — 85025 COMPLETE CBC W/AUTO DIFF WBC: CPT

## 2022-05-21 PROCEDURE — 87086 URINE CULTURE/COLONY COUNT: CPT

## 2022-05-21 PROCEDURE — 80053 COMPREHEN METABOLIC PANEL: CPT

## 2022-05-21 PROCEDURE — 36415 COLL VENOUS BLD VENIPUNCTURE: CPT

## 2022-05-21 PROCEDURE — 96374 THER/PROPH/DIAG INJ IV PUSH: CPT

## 2022-05-21 PROCEDURE — 93005 ELECTROCARDIOGRAM TRACING: CPT

## 2022-05-21 PROCEDURE — U0003: CPT

## 2022-05-21 PROCEDURE — U0005: CPT

## 2022-05-21 PROCEDURE — 87077 CULTURE AEROBIC IDENTIFY: CPT

## 2022-05-21 PROCEDURE — 99284 EMERGENCY DEPT VISIT MOD MDM: CPT | Mod: 25

## 2022-05-21 PROCEDURE — 99284 EMERGENCY DEPT VISIT MOD MDM: CPT

## 2022-05-21 PROCEDURE — 93010 ELECTROCARDIOGRAM REPORT: CPT

## 2022-05-21 RX ORDER — CIPROFLOXACIN LACTATE 400MG/40ML
400 VIAL (ML) INTRAVENOUS ONCE
Refills: 0 | Status: COMPLETED | OUTPATIENT
Start: 2022-05-21 | End: 2022-05-21

## 2022-05-21 RX ORDER — SODIUM CHLORIDE 9 MG/ML
1000 INJECTION INTRAMUSCULAR; INTRAVENOUS; SUBCUTANEOUS ONCE
Refills: 0 | Status: COMPLETED | OUTPATIENT
Start: 2022-05-21 | End: 2022-05-21

## 2022-05-21 RX ADMIN — SODIUM CHLORIDE 2000 MILLILITER(S): 9 INJECTION INTRAMUSCULAR; INTRAVENOUS; SUBCUTANEOUS at 10:57

## 2022-05-21 RX ADMIN — Medication 200 MILLIGRAM(S): at 11:43

## 2022-05-21 NOTE — ED PROVIDER NOTE - OBJECTIVE STATEMENT
88 y/o male with a PMHx of BPH, memory deficit, urinary retention, Schreiber catheter in place, anxiety, merkel cell skin CA of nose, HTN presents to the ED c/o hematuria.  +cigarette smoker. Per wife, pt has infection and  has been in [facility] the week of 05/02  for 4 days for suprapubic catheter placement. Reports he was in [facility] again for change of catheter 05/18 . Reports confusion. Denies fever, chills, v/d. On Cipro for infection.

## 2022-05-21 NOTE — ED PROVIDER NOTE - GASTROINTESTINAL, MLM
Abdomen soft, non-tender, no guarding. +suprapubic catheter  present, draining, no obvious blood in bag.

## 2022-05-21 NOTE — ED PROVIDER NOTE - CARE PROVIDER_API CALL
Antony Georges)  Urology  [address] [place] [address]  Phone: [phone]  Fax: [phone]  Established Patient  Follow Up Time: Urgent

## 2022-05-21 NOTE — ED ADULT NURSE NOTE - OBJECTIVE STATEMENT
pt presents to ED c/o hematuria in suprapubic catheter. Pt recently seen in [facility] and had suprapubic changed. Wife at bedside believes pt was doing PT and may have irritated area causing bleeding. Pt in no acute distress. denies complaints. pt [place]. will ctm

## 2022-05-21 NOTE — ED ADULT TRIAGE NOTE - CHIEF COMPLAINT QUOTE
Pt. to the ED C/O Hematuria--Wife states patient has Suprapubic Catheter changed in ED last Wednesday

## 2022-05-21 NOTE — ED PROVIDER NOTE - PATIENT PORTAL LINK FT
You can access the FollowMyHealth Patient Portal offered by [facility] by registering at the following website: [email or web address]. By joining [employer]’s FollowMyHealth portal, you will also be able to view your health information using other applications (apps) compatible with our system.

## 2022-05-21 NOTE — ED ADULT NURSE NOTE - NS ED NOTE ABUSE SUSPICION NEGLECT YN
[facility] Medicine Daily Progress Note    Date of Service  5/16/2019    Chief Complaint  89 y.o. female admitted 11/13/2018 with STEMI    Hospital Course  Admitted with ST elevation Myocardial infarction, patient did not want to pursue aggressive interventions or procedures, wanted to withhold all medical treatment    Interval Problem Update  Smiling and denies any discomfort.     Consultants/Specialty  Cardiology signed off  Critical care signed off    Code Status  DNR/DNI/comfort care    Disposition  Placement pending    Review of Systems  Review of Systems   Constitutional: Negative for chills, fever and malaise/fatigue.   HENT: Negative for hearing loss and sore throat.    Eyes: Positive for blurred vision.   Respiratory: Negative for cough, shortness of breath and wheezing.    Cardiovascular: Negative for chest pain, palpitations and leg swelling.   Gastrointestinal: Negative for abdominal pain, diarrhea, heartburn, nausea and vomiting.   Genitourinary: Negative for dysuria.   Musculoskeletal: Negative for back pain and neck pain.   Skin: Negative for rash.   Neurological: Negative for dizziness, sensory change, speech change, focal weakness, weakness and headaches.   Psychiatric/Behavioral: The patient is not nervous/anxious.         Physical Exam  Temp:  [36.3 °C (97.4 °F)-36.4 °C (97.5 °F)] 36.4 °C (97.5 °F)  Pulse:  [63-87] 63  Resp:  [15-16] 15  BP: (101-109)/(55-62) 109/62  SpO2:  [91 %-92 %] 92 %    Physical Exam   Constitutional: She appears well-developed.   HENT:   Head: Normocephalic and atraumatic.   Eyes: Pupils are equal, round, and reactive to light. Conjunctivae are normal.   Neck: No tracheal deviation present. No thyromegaly present.   Cardiovascular: Normal rate and regular rhythm.    Murmur heard.  Pulmonary/Chest: Effort normal and breath sounds normal.   Abdominal: Soft. Bowel sounds are normal. She exhibits no distension. There is no tenderness.   Musculoskeletal: She exhibits edema.    Lymphadenopathy:     She has no cervical adenopathy.   Neurological: She is alert.   Oriented to person and place   Skin: Skin is warm and dry.   Nursing note and vitals reviewed.      Fluids    Intake/Output Summary (Last 24 hours) at 05/16/19 1347  Last data filed at 05/16/19 1000   Gross per 24 hour   Intake             1100 ml   Output                0 ml   Net             1100 ml       Laboratory                        Imaging  DX-CHEST-PORTABLE (1 VIEW)   Final Result         1.  Hazy interstitial left pulmonary opacities suggests interstitial edema or infiltrate, similar to prior.   2.  Trace left pleural effusion   3.  Hyperexpansion of lungs favors changes of COPD.      DX-CHEST-PORTABLE (1 VIEW)   Final Result         1.  Interstitial infiltrates or edema, stable.   2.  Atherosclerosis      DX-CHEST-PORTABLE (1 VIEW)   Final Result         1.  Interstitial infiltrates or edema.   2.  Atherosclerosis      DX-CHEST-PORTABLE (1 VIEW)   Final Result      Moderate interstitial edema or interstitial lung disease and small left pleural effusion similar to previous.      DX-CHEST-PORTABLE (1 VIEW)   Final Result      Stable interstitial edema and/or interstitial lung disease with probable small pleural fluid      DX-CHEST-PORTABLE (1 VIEW)   Final Result      Ill-defined infrahilar interstitial opacities, atelectasis versus mild edema. No significant pleural effusions.      DX-CHEST-PORTABLE (1 VIEW)   Final Result      Mild left basilar atelectasis, improved since prior. No new consolidation or large pleural effusions.      DX-CHEST-PORTABLE (1 VIEW)   Final Result      1.  Left basilar opacification may be secondary to atelectasis. Developing pneumonia cannot be excluded.         DX-CHEST-PORTABLE (1 VIEW)   Final Result      1.  Unchanged mild interstitial pulmonary edema   2.  Unchanged bibasilar atelectasis, alveolar edema or pneumonia      DX-CHEST-PORTABLE (1 VIEW)   Final Result      1.  Decreased  pulmonary edema   2.  Unchanged bibasilar atelectasis, alveolar edema or pneumonia      DX-CHEST-PORTABLE (1 VIEW)   Final Result      1.  There is diffuse interstitial and alveolar density in the right mid and lower lung zone. This is increased since the previous study. This may represent mild pulmonary edema/consolidation.   2.  Mildly enlarged cardiomediastinal silhouette when compared with the previous chest x-ray.      DX-CHEST-PORTABLE (1 VIEW)   Final Result      Stable mild pulmonary edema.   New left basilar atelectasis.      DX-CHEST-PORTABLE (1 VIEW)   Final Result      Stable chest appearance compared with 11/16.      DX-CHEST-PORTABLE (1 VIEW)   Final Result      No acute cardiopulmonary abnormality identified.      DX-CHEST-PORTABLE (1 VIEW)   Final Result      No acute cardiopulmonary abnormality. No interval change.      DX-CHEST-PORTABLE (1 VIEW)   Final Result      No acute cardiopulmonary disease.      CT-HEAD W/O   Final Result      1.  No evidence of acute intracranial process.      2.  There is again seen interstitial pulmonary edema and bibasilar atelectasis.      CT-HIP W/O PLUS RECONS LEFT   Final Result      1.  No evidence of acute fracture or malalignment. No evidence of hardware failure or complication.   2.  Postsurgical changes of the left femur with broken screw fragment redemonstrated.   3.  Demineralization.   4.  Scattered colonic diverticula.   5.  Atherosclerosis.   6.  Small fat-containing umbilical hernia.      EC-ECHOCARDIOGRAM COMPLETE W/O CONT   Final Result      DX-HIP-COMPLETE - UNILATERAL 2+ LEFT   Final Result      1.  No definite acute osseous abnormality. In setting of demineralization, an occult fracture is difficult to exclude. If there is strong clinical suspicion, CT or MRI could be obtained for further evaluation.   2.  Postsurgical changes of the left femur with broken screw fragment redemonstrated.      DX-CHEST-PORTABLE (1 VIEW)   Final Result      No acute  cardiopulmonary process is seen.      Atherosclerotic plaque.           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         Essential hypertension- (present on admission)   Assessment & Plan    Comfort care       Comfort measures only status- (present on admission)   Assessment & Plan    In place     Advanced directives, counseling/discussion- (present on admission)   Assessment & Plan    Comfort care     Fall- (present on admission)   Assessment & Plan    fall precautions            VTE prophylaxis: Comfort care       No

## 2022-05-21 NOTE — ED ADULT NURSE NOTE - CADM POA PRESS ULCER
Preliminary CT abd/pelvis: New right lower lobe patchy opacity, likely pneumonia. New partially imaged ill-defined left lower lobe opacities may reflect distal mucoid impacted airways or infectious etiology.  New dilated loops of air and fluid-filled small bowel and stomach with collapsed loops distally without a discrete transition point, compatible with ileus vs. early or incomplete small bowel obstruction.  Interval placement of a gastrostomy tube which terminates in the stomach with associated pneumoperitoneum, likely postprocedural given the patient's placement of a gastrostomy tube on 12/21/2019.  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   Interval debridement of a sacral decubitus ulcer which extends to the sacrum. Correlate for osteomyelitis.    Discussed above results with Dr. James.   Pt's bp this afternoon, 83/54, , repeated : 93/59, hr 118, 90/52, hr 128. EKG shows Sinus tachycardia.   --Continue to hold peg feeds  -Surgery called for consult. Ordered Peg to gravity per surgery. PE: Alert, non-verbal, CV-S1, S2, Reg, Lungs- diminished, Abdomen- distended, BSx 4, hypoactive.  -MICU called for consult.   -Signed out to night team to follow up Preliminary CT abd/pelvis: New right lower lobe patchy opacity, likely pneumonia. New partially imaged ill-defined left lower lobe opacities may reflect distal mucoid impacted airways or infectious etiology.  New dilated loops of air and fluid-filled small bowel and stomach with collapsed loops distally without a discrete transition point, compatible with ileus vs. early or incomplete small bowel obstruction.  Interval placement of a gastrostomy tube which terminates in the stomach with associated pneumoperitoneum, likely postprocedural given the patient's placement of a gastrostomy tube on 12/21/2019.  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   Interval debridement of a sacral decubitus ulcer which extends to the sacrum. Correlate for osteomyelitis.    Discussed above results with Dr. James. Pt's bp this afternoon, 83/54, , repeated : 93/59, hr 118, 90/52, hr 128. EKG shows Sinus tachycardia.   PE: Alert, non-verbal, CV-S1, S2, Reg, Lungs- diminished, Abdomen- distended, BSx 4, hypoactive.  Vital Signs Last 24 Hrs  T(C): 37.2 (30 Dec 2019 18:15), Max: 37.3 (30 Dec 2019 16:49)  T(F): 99 (30 Dec 2019 18:15), Max: 99.2 (30 Dec 2019 16:49)  HR: 128 (30 Dec 2019 18:15) (98 - 128)  BP: 90/52 (30 Dec 2019 18:15) (83/54 - 112/62)  BP(mean): --  RR: 18 (30 Dec 2019 18:15) (16 - 18)  SpO2: 95% (30 Dec 2019 18:15) (88% - 97%)                        9.0    18.67 )-----------( 274      ( 30 Dec 2019 08:43 )             30.1   12-30    134<L>  |  91<L>  |  37<H>  ----------------------------<  133<H>  3.5   |  24  |  2.53<H>    Ca    10.6<H>      30 Dec 2019 06:19    A/p: 74 M PMHx of ESRD s/p failed renal transplant x2, HCV, DM, and meningococcal meningitis 2 years ago was sent in to the ED from [facility] for AMS, hypotension, asp pneumonia, respiratory failure (was admitted in MICU, intubated/extubated), dyphagia s/p Peg placement, with new GNR on Ertapenem, now with ileus vs early or incomplete bowel obstruction, hypotension and tachycardia.   --Continue to hold peg feeds  -Surgery called for consult. Ordered Peg to gravity per surgery.  -MICU called for consult.   -Signed out to night team to follow up    Dw . No

## 2022-05-22 LAB
CULTURE RESULTS: SIGNIFICANT CHANGE UP
SPECIMEN SOURCE: SIGNIFICANT CHANGE UP

## 2022-05-23 RX ORDER — FLUCONAZOLE 150 MG/1
1 TABLET ORAL
Qty: 1 | Refills: 0
Start: 2022-05-23 | End: 2022-05-23

## 2022-06-20 NOTE — SWALLOW BEDSIDE ASSESSMENT ADULT - NS SPL SWALLOW CLINIC TRIAL FT
presently there is no contraindication for regular consistency diet with regular consistency liquids; straw ok, and for meds.  As per NSg, pt does need assistance: cut up food and supervise. Unclear how focused  pt can remain to feeding himself.  Disc with Nsg and with wife. Clothing

## 2022-08-31 NOTE — ED PROVIDER NOTE - SCRIBE NAME
Returned telephone call to patient son cory per request  Reviewed that patient will be seen post hospitalization to review most recent labs, scans and treatment going forward  Reviewed will reach out when appointment is scheduled  Patient son appreciative of call  China Starr

## 2022-12-27 NOTE — ED ADULT NURSE NOTE - PAIN: PRESENCE, MLM
denies pain/discomfort Niacinamide Pregnancy And Lactation Text: These medications are considered safe during pregnancy.

## 2022-12-28 NOTE — PATIENT PROFILE ADULT - FALL HARM RISK
bones(Osteoporosis,prev fx,steroid use,metastatic bone ca) Azelaic Acid Counseling: Patient counseled that medicine may cause skin irritation and to avoid applying near the eyes.  In the event of skin irritation, the patient was advised to reduce the amount of the drug applied or use it less frequently.   The patient verbalized understanding of the proper use and possible adverse effects of azelaic acid.  All of the patient's questions and concerns were addressed.

## 2023-05-16 NOTE — PHYSICAL THERAPY INITIAL EVALUATION ADULT - ADL SKILLS, REHAB EVAL
[facility]    PATIENT'S NAME: Yesica Palmer   ATTENDING PHYSICIAN: Emily Hastings M.D. PATIENT ACCOUNT#:   [de-identified]    LOCATION:  [address]  MEDICAL RECORD #:   [id number]       YOB: 1954  ADMISSION DATE:       05/10/2023    HISTORY AND PHYSICAL EXAMINATION    UPDATED HISTORY AND PHYSICAL    HISTORY OF PRESENT ILLNESS:  A 22-year-old male with a history of stent in LAD and severe hypertension with abnormal stress test, for cardiac catheterization.     Dictated By Emily Hastings M.D.  d: 05/16/2023 13:06:38  t: 05/16/2023 13:40:36  [facility] [id number]  [facility]/ independent

## 2023-09-28 NOTE — ASU PATIENT PROFILE, ADULT - TEACHING/LEARNING CULTURAL CONSIDERATIONS
Procedure:  EGD  COLONOSCOPY    Relevant Problems   ANESTHESIA (within normal limits)      CARDIO (within normal limits)      ENDO (within normal limits)      GI/HEPATIC (within normal limits)      /RENAL (within normal limits)      GYN (within normal limits)      HEMATOLOGY (within normal limits)      MUSCULOSKELETAL (within normal limits)      NEURO/PSYCH   (+) Numbness and tingling in left arm      PULMONARY   (+) Smoking               Anesthesia Plan  ASA Score- 2     Anesthesia Type- IV sedation with anesthesia with ASA Monitors. Additional Monitors:   Airway Plan:           Plan Factors-Exercise tolerance (METS): >4 METS. Chart reviewed. Patient summary reviewed. Patient is a current smoker. Patient instructed to abstain from smoking on day of procedure. Patient smoked on day of surgery. Induction- intravenous. Postoperative Plan-     Informed Consent- Anesthetic plan and risks discussed with patient. none

## 2024-05-29 NOTE — DIETITIAN INITIAL EVALUATION ADULT - ETIOLOGY-BASIS
Na 128 on admission  likely hypervolemic hyponatremia  rec'd NaCl tabs during last admission  Today is 134, will monitor daily Chronic illness

## 2024-07-01 NOTE — ED ADULT NURSE NOTE - CHIEF COMPLAINT QUOTE
increasing lethargy since last friday (5 days). Continue Regimen: In one week, resume efudex 5% cr apply 3x/week at night Detail Level: Zone

## 2024-07-24 NOTE — PHYSICAL THERAPY INITIAL EVALUATION ADULT - PRECAUTIONS/LIMITATIONS, REHAB EVAL
July 24, 2024    [facility]  [address]  [facility] [address]      Dear Diego:    As a member of [facility] ([facility] ([facility]), you are provided a care coordinator. I will be your new care coordinator as of 8/1/2024. I will be calling you soon to see how you are doing and determine your needs.    If you have any questions, please feel free to call me at [phone]. If you reach my voice mail, please leave a message and your phone number. If you are hearing impaired, please call the [place] [employer] at [phone] or [phone] ([email or web address] relay service).    I look forward to speaking with you soon.    Sincerely,      Leonor Stack RN  [phone]  [email or web address] is a health plan that contracts with both Medicare and the [place] Medical Assistance (Medicaid) program to provide benefits of both programs to enrollees. Enrollment in [facility] depends on contract renewal.      [facility]  [id number] DHS Approved ([id number])  [id number] (11/18)                 
aspiration precautions

## 2025-07-25 NOTE — ED PROVIDER NOTE - GASTROINTESTINAL, MLM
Detail Level: Generalized
Detail Level: Simple
Detail Level: Zone
Abdomen soft, non-tender, no guarding.